# Patient Record
Sex: FEMALE | Race: ASIAN | NOT HISPANIC OR LATINO | Employment: FULL TIME | URBAN - METROPOLITAN AREA
[De-identification: names, ages, dates, MRNs, and addresses within clinical notes are randomized per-mention and may not be internally consistent; named-entity substitution may affect disease eponyms.]

---

## 2017-03-03 ENCOUNTER — OFFICE VISIT (OUTPATIENT)
Dept: URGENT CARE | Facility: URGENT CARE | Age: 62
End: 2017-03-03
Payer: COMMERCIAL

## 2017-03-03 VITALS
WEIGHT: 101.8 LBS | DIASTOLIC BLOOD PRESSURE: 73 MMHG | SYSTOLIC BLOOD PRESSURE: 139 MMHG | HEART RATE: 69 BPM | TEMPERATURE: 98.3 F | BODY MASS INDEX: 21.28 KG/M2 | OXYGEN SATURATION: 100 %

## 2017-03-03 DIAGNOSIS — J20.9 ACUTE BRONCHITIS, UNSPECIFIED ORGANISM: ICD-10-CM

## 2017-03-03 DIAGNOSIS — R07.0 THROAT PAIN: Primary | ICD-10-CM

## 2017-03-03 LAB
DEPRECATED S PYO AG THROAT QL EIA: NORMAL
MICRO REPORT STATUS: NORMAL
SPECIMEN SOURCE: NORMAL

## 2017-03-03 PROCEDURE — 87880 STREP A ASSAY W/OPTIC: CPT | Performed by: PHYSICIAN ASSISTANT

## 2017-03-03 PROCEDURE — 99214 OFFICE O/P EST MOD 30 MIN: CPT | Performed by: PHYSICIAN ASSISTANT

## 2017-03-03 RX ORDER — ALBUTEROL SULFATE 90 UG/1
2 AEROSOL, METERED RESPIRATORY (INHALATION) EVERY 6 HOURS PRN
Qty: 1 INHALER | Refills: 0 | Status: SHIPPED | OUTPATIENT
Start: 2017-03-03 | End: 2018-05-11

## 2017-03-03 RX ORDER — CODEINE PHOSPHATE AND GUAIFENESIN 10; 100 MG/5ML; MG/5ML
1 SOLUTION ORAL EVERY 4 HOURS PRN
Qty: 120 ML | Refills: 0 | Status: SHIPPED | OUTPATIENT
Start: 2017-03-03 | End: 2018-05-11

## 2017-03-03 NOTE — NURSING NOTE
"Chief Complaint   Patient presents with     URI     Started last week       Initial /73  Pulse 69  Temp 98.3  F (36.8  C) (Oral)  Wt 101 lb 12.8 oz (46.2 kg)  SpO2 100%  BMI 21.28 kg/m2 Estimated body mass index is 21.28 kg/(m^2) as calculated from the following:    Height as of 5/27/16: 4' 10\" (1.473 m).    Weight as of this encounter: 101 lb 12.8 oz (46.2 kg).  Medication Reconciliation: complete   Mathew Bhatt/MA      "

## 2017-03-03 NOTE — MR AVS SNAPSHOT
"              After Visit Summary   3/3/2017    Juanita Galvan    MRN: [id number]           Patient Information     Date Of Birth          1955        Visit Information        Provider Department      3/3/2017 5:40 PM Ninfa Daniels PA-C [facility]        Today's Diagnoses     Throat pain    -  1    Acute bronchitis, unspecified organism          Care Instructions    Use the albuterol inhaler during the day to help with cough and shortness of breath.      Use the cough syrup at night as needed.  This will make you sleepy, so you cannot drive after taking.        Follow-ups after your visit        Who to contact     If you have questions or need follow up information about today's clinic visit or your schedule please contact [facility] directly at [phone].  Normal or non-critical lab and imaging results will be communicated to you by [email or web address], letter or phone within 4 business days after the clinic has received the results. If you do not hear from us within 7 days, please contact the clinic through [email or web address] or phone. If you have a critical or abnormal lab result, we will notify you by phone as soon as possible.  Submit refill requests through RMI or call your pharmacy and they will forward the refill request to us. Please allow 3 business days for your refill to be completed.          Additional Information About Your Visit        MyChart Information     RMI lets you send messages to your doctor, view your test results, renew your prescriptions, schedule appointments and more. To sign up, go to [email or web address] . Click on \"Log in\" on the left side of the screen, which will take you to the Welcome page. Then click on \"Sign up Now\" on the right side of the page.     You will be asked to enter the access code listed below, as well as some personal information. Please follow the directions to create your username and password.     Your access code is: " [email or web address]  Expires: 2017  6:22 PM     Your access code will  in 90 days. If you need help or a new code, please call your [place] clinic or [phone].        Care EveryWhere ID     This is your Care EveryWhere ID. This could be used by other organizations to access your [place] medical records  [id number]        Your Vitals Were     Pulse Temperature Pulse Oximetry BMI (Body Mass Index)          69 98.3  F (36.8  C) (Oral) 100% 21.28 kg/m2         Blood Pressure from Last 3 Encounters:   17 139/73   16 112/71   06/10/16 135/76    Weight from Last 3 Encounters:   17 101 lb 12.8 oz (46.2 kg)   16 107 lb 3.2 oz (48.6 kg)   16 105 lb 12.8 oz (48 kg)              We Performed the Following     Strep, Rapid Screen          Today's Medication Changes          These changes are accurate as of: 3/3/17  6:22 PM.  If you have any questions, ask your nurse or doctor.               Start taking these medicines.        Dose/Directions    albuterol 108 (90 BASE) MCG/ACT Inhaler   Commonly known as:  PROAIR HFA/PROVENTIL HFA/VENTOLIN HFA   Used for:  Acute bronchitis, unspecified organism   Started by:  Ninfa Daniels PA-C        Dose:  2 puff   Inhale 2 puffs into the lungs every 6 hours as needed for shortness of breath / dyspnea or wheezing   Quantity:  1 Inhaler   Refills:  0       guaiFENesin-codeine 100-10 MG/5ML Soln solution   Commonly known as:  ROBITUSSIN AC   Used for:  Acute bronchitis, unspecified organism   Started by:  Ninfa Daniels PA-C        Dose:  1 tsp.   Take 5 mLs by mouth every 4 hours as needed for cough (only for nighttime)   Quantity:  120 mL   Refills:  0            Where to get your medicines      These medications were sent to [id number] - [place] [address] [address] AT [facility] & [address] [place] [address]     Phone:  [phone]     albuterol 108 (90 BASE) MCG/ACT Inhaler          Some of these will need a paper prescription and others can be bought over the counter.  Ask your nurse if you have questions.     Bring a paper prescription for each of these medications     guaiFENesin-codeine 100-10 MG/5ML Soln solution                Primary Care Provider Office Phone # Fax #    Stephany Colby PA-C [phone]       [facility] [address]  [facility] [address]        Thank you!     Thank you for choosing [facility]  for your care. Our goal is always to provide you with excellent care. Hearing back from our patients is one way we can continue to improve our services. Please take a few minutes to complete the written survey that you may receive in the mail after your visit with us. Thank you!             Your Updated Medication List - Protect others around you: Learn how to safely use, store and throw away your medicines at www.disposemymeds.org.          This list is accurate as of: 3/3/17  6:22 PM.  Always use your most recent med list.                   Brand Name Dispense Instructions for use    albuterol 108 (90 BASE) MCG/ACT Inhaler    PROAIR HFA/PROVENTIL HFA/VENTOLIN HFA    1 Inhaler    Inhale 2 puffs into the lungs every 6 hours as needed for shortness of breath / dyspnea or wheezing       guaiFENesin-codeine 100-10 MG/5ML Soln solution    ROBITUSSIN AC    120 mL    Take 5 mLs by mouth every 4 hours as needed for cough (only for nighttime)       TYLENOL PO

## 2017-03-03 NOTE — PROGRESS NOTES
SUBJECTIVE:                                                    Juanita Galvan is a 61 year old female who presents to clinic today for the following health issues:      RESPIRATORY SYMPTOMS      Duration: Last week    Description  nasal congestion, rhinorrhea, sore throat, facial pain/pressure, cough, chills, headache and fatigue/malaise    Severity: Moderate.  Cough is worse at night, better during the day.    Accompanying signs and symptoms: None    History (predisposing factors):  none    Precipitating or alleviating factors: None    Therapies tried and outcome: OTC         Problem list and histories reviewed & adjusted, as indicated.  Additional history: as documented    Patient Active Problem List   Diagnosis     CARDIOVASCULAR SCREENING; LDL GOAL LESS THAN 160     Vitreous floaters     Advanced directives, counseling/discussion     Right foot pain     Past Surgical History   Procedure Laterality Date     Colonoscopy  3/26/2012     Procedure:COLONOSCOPY; COLONOSCOPY, SCREEN; Surgeon:MICAH RICHARDSON; Location: OR       Social History   Substance Use Topics     Smoking status: Never Smoker     Smokeless tobacco: Never Used     Alcohol use No     Family History   Problem Relation Age of Onset     CANCER No family hx of      DIABETES No family hx of      Hypertension No family hx of      CEREBROVASCULAR DISEASE No family hx of      Thyroid Disease No family hx of      Glaucoma No family hx of      Macular Degeneration No family hx of          Current Outpatient Prescriptions   Medication Sig Dispense Refill     guaiFENesin-codeine (ROBITUSSIN AC) 100-10 MG/5ML SOLN solution Take 5 mLs by mouth every 4 hours as needed for cough (only for nighttime) 120 mL 0     albuterol (PROAIR HFA/PROVENTIL HFA/VENTOLIN HFA) 108 (90 BASE) MCG/ACT Inhaler Inhale 2 puffs into the lungs every 6 hours as needed for shortness of breath / dyspnea or wheezing 1 Inhaler 0     Acetaminophen (TYLENOL PO)        Recent Labs   Lab  Test  11/28/14   1555  11/07/14   0835  03/29/13   1458   LDL   --   135*  133*   HDL   --   56  45*   TRIG   --   70  92   CR  0.57   --    --    GFRESTIMATED  >90  Non  GFR Calc     --    --    GFRESTBLACK  >90   GFR Calc     --    --    POTASSIUM  3.9   --    --    TSH   --   2.08  1.22      Labs reviewed in EPIC    Reviewed and updated as needed this visit by clinical staff       Reviewed and updated as needed this visit by Provider         ROS:  Constitutional, HEENT, cardiovascular, pulmonary, gi and gu systems are negative, except as otherwise noted.    OBJECTIVE:                                                    /73  Pulse 69  Temp 98.3  F (36.8  C) (Oral)  Wt 101 lb 12.8 oz (46.2 kg)  SpO2 100%  BMI 21.28 kg/m2  Body mass index is 21.28 kg/(m^2).  GENERAL: healthy, alert and no distress  EYES: Eyes grossly normal to inspection, PERRL and conjunctivae and sclerae normal  HENT: ear canals and TM's normal, nose and mouth without ulcers or lesions  NECK: no adenopathy, no asymmetry, masses, or scars and thyroid normal to palpation  RESP: lungs clear to auscultation - no rales, rhonchi or wheezes  CV: regular rate and rhythm, normal S1 S2, no S3 or S4, no murmur, click or rub, no peripheral edema and peripheral pulses strong  ABDOMEN: soft, nontender, no hepatosplenomegaly, no masses and bowel sounds normal  MS: no gross musculoskeletal defects noted, no edema    Diagnostic Test Results:  Strep screen - Negative     ASSESSMENT/PLAN:                                                        ICD-10-CM    1. Throat pain R07.0 Strep, Rapid Screen   2. Acute bronchitis, unspecified organism J20.9 guaiFENesin-codeine (ROBITUSSIN AC) 100-10 MG/5ML SOLN solution     albuterol (PROAIR HFA/PROVENTIL HFA/VENTOLIN HFA) 108 (90 BASE) MCG/ACT Inhaler       See Patient Instructions.  The patient indicates understanding of these issues and agrees with the plan.      Ninfa Daniels,  NHI  [facility]

## 2017-03-04 NOTE — PATIENT INSTRUCTIONS
Use the albuterol inhaler during the day to help with cough and shortness of breath.      Use the cough syrup at night as needed.  This will make you sleepy, so you cannot drive after taking.

## 2017-11-24 ENCOUNTER — RADIANT APPOINTMENT (OUTPATIENT)
Dept: MAMMOGRAPHY | Facility: CLINIC | Age: 62
End: 2017-11-24
Attending: NURSE PRACTITIONER
Payer: COMMERCIAL

## 2017-11-24 DIAGNOSIS — Z12.31 VISIT FOR SCREENING MAMMOGRAM: ICD-10-CM

## 2017-11-24 PROCEDURE — G0202 SCR MAMMO BI INCL CAD: HCPCS | Mod: TC

## 2018-05-11 ENCOUNTER — OFFICE VISIT (OUTPATIENT)
Dept: FAMILY MEDICINE | Facility: CLINIC | Age: 63
End: 2018-05-11
Payer: COMMERCIAL

## 2018-05-11 VITALS
WEIGHT: 103.6 LBS | OXYGEN SATURATION: 100 % | TEMPERATURE: 98.6 F | HEART RATE: 73 BPM | RESPIRATION RATE: 20 BRPM | HEIGHT: 59 IN | BODY MASS INDEX: 20.88 KG/M2 | SYSTOLIC BLOOD PRESSURE: 117 MMHG | DIASTOLIC BLOOD PRESSURE: 75 MMHG

## 2018-05-11 DIAGNOSIS — L85.3 DRY SKIN: ICD-10-CM

## 2018-05-11 DIAGNOSIS — Z00.00 ROUTINE HISTORY AND PHYSICAL EXAMINATION OF ADULT: Primary | ICD-10-CM

## 2018-05-11 DIAGNOSIS — Z11.4 SCREENING FOR HIV (HUMAN IMMUNODEFICIENCY VIRUS): ICD-10-CM

## 2018-05-11 DIAGNOSIS — L30.9 ECZEMA, UNSPECIFIED TYPE: ICD-10-CM

## 2018-05-11 LAB
CHOLEST SERPL-MCNC: 234 MG/DL
GLUCOSE SERPL-MCNC: 100 MG/DL (ref 70–99)
HDLC SERPL-MCNC: 58 MG/DL
LDLC SERPL CALC-MCNC: 156 MG/DL
NONHDLC SERPL-MCNC: 176 MG/DL
TRIGL SERPL-MCNC: 99 MG/DL

## 2018-05-11 PROCEDURE — 99213 OFFICE O/P EST LOW 20 MIN: CPT | Mod: 25 | Performed by: PHYSICIAN ASSISTANT

## 2018-05-11 PROCEDURE — 82947 ASSAY GLUCOSE BLOOD QUANT: CPT | Performed by: PHYSICIAN ASSISTANT

## 2018-05-11 PROCEDURE — 36415 COLL VENOUS BLD VENIPUNCTURE: CPT | Performed by: PHYSICIAN ASSISTANT

## 2018-05-11 PROCEDURE — 87389 HIV-1 AG W/HIV-1&-2 AB AG IA: CPT | Performed by: PHYSICIAN ASSISTANT

## 2018-05-11 PROCEDURE — 80061 LIPID PANEL: CPT | Performed by: PHYSICIAN ASSISTANT

## 2018-05-11 PROCEDURE — 99396 PREV VISIT EST AGE 40-64: CPT | Performed by: PHYSICIAN ASSISTANT

## 2018-05-11 RX ORDER — GLY/DIMETH/PETROLAT,WHT/WATER
CREAM (GRAM) TOPICAL DAILY
Qty: 453 G | Refills: 1 | Status: SHIPPED | OUTPATIENT
Start: 2018-05-11 | End: 2022-11-01

## 2018-05-11 RX ORDER — TRIAMCINOLONE ACETONIDE 1 MG/G
CREAM TOPICAL
Qty: 45 G | Refills: 1 | Status: SHIPPED | OUTPATIENT
Start: 2018-05-11 | End: 2021-03-10

## 2018-05-11 ASSESSMENT — PAIN SCALES - GENERAL: PAINLEVEL: NO PAIN (0)

## 2018-05-11 NOTE — LETTER
May 15, 2018      Juanita Galvan  [address]  [place] [address]        Dear Juanita Johns,    Sugar is elevated, please reduce rice and other carbohydrates in the diet and sugars. Cholesterol is slightly elevated, increase exercise and fiber in the diet to improve this.     Sincerely,      Whitney Coleman PAC/ymv    Resulted Orders   GLUCOSE   Result Value Ref Range    Glucose 100 (H) 70 - 99 mg/dL      Comment:      Fasting specimen   Lipid Profile (Chol, Trig, HDL, LDL calc)   Result Value Ref Range    Cholesterol 234 (H) <200 mg/dL      Comment:      Desirable:       <200 mg/dl    Triglycerides 99 <150 mg/dL      Comment:      Fasting specimen    HDL Cholesterol 58 >49 mg/dL    LDL Cholesterol Calculated 156 (H) <100 mg/dL      Comment:      Above desirable:  100-129 mg/dl  Borderline High:  130-159 mg/dL  High:             160-189 mg/dL  Very high:       >189 mg/dl      Non HDL Cholesterol 176 (H) <130 mg/dL      Comment:      Above Desirable:  130-159 mg/dl  Borderline high:  160-189 mg/dl  High:             190-219 mg/dl  Very high:       >219 mg/dl     HIV Antigen Antibody Combo   Result Value Ref Range    HIV Antigen Antibody Combo Nonreactive NR^Nonreactive          Comment:      HIV-1 p24 Ag & HIV-1/HIV-2 Ab Not Detected

## 2018-05-11 NOTE — PROGRESS NOTES
SUBJECTIVE:   CC: Juanita Galvan is an 62 year old woman who presents for preventive health visit.     Healthy Habits:    Do you get at least three servings of calcium containing foods daily (dairy, green leafy vegetables, etc.)? yes    Amount of exercise or daily activities, outside of work: no    Problems taking medications regularly not applicable    Medication side effects: No    Have you had an eye exam in the past two years? yes    Do you see a dentist twice per year? yes    Do you have sleep apnea, excessive snoring or daytime drowsiness?no      Dry skin      Duration: chronic on and off    Description (location/character/radiation): patient has generalized dry skin and occasionally she gets red itchy patches inside her elbows.     Intensity:  moderate    Accompanying signs and symptoms:     History (similar episodes/previous evaluation): yes on and off    Precipitating or alleviating factors: dry environment    Therapies tried and outcome: baby cream       Today's PHQ-2 Score:   PHQ-2 ( 1999 [employer]) 5/11/2018 5/27/2016   Q1: Little interest or pleasure in doing things 0 0   Q2: Feeling down, depressed or hopeless 0 0   PHQ-2 Score 0 0       Abuse: Current or Past(Physical, Sexual or Emotional)- No  Do you feel safe in your environment - Yes    Social History   Substance Use Topics     Smoking status: Never Smoker     Smokeless tobacco: Never Used     Alcohol use No     If you drink alcohol do you typically have >3 drinks per day or >7 drinks per week? No                     Reviewed orders with patient.  Reviewed health maintenance and updated orders accordingly - Yes  Patient Active Problem List   Diagnosis     CARDIOVASCULAR SCREENING; LDL GOAL LESS THAN 160     Vitreous floaters     Advanced directives, counseling/discussion     Right foot pain     Past Surgical History:   Procedure Laterality Date     COLONOSCOPY  3/26/2012    Procedure:COLONOSCOPY; COLONOSCOPY, SCREEN; Surgeon:MICAH RICHARDSON;  Location: OR       Social History   Substance Use Topics     Smoking status: Never Smoker     Smokeless tobacco: Never Used     Alcohol use No     Family History   Problem Relation Age of Onset     CANCER No family hx of      DIABETES No family hx of      Hypertension No family hx of      CEREBROVASCULAR DISEASE No family hx of      Thyroid Disease No family hx of      Glaucoma No family hx of      Macular Degeneration No family hx of          Current Outpatient Prescriptions   Medication Sig Dispense Refill     Emollient (CETAPHIL MOISTURIZING) CREA Externally apply topically daily 453 g 1     triamcinolone (KENALOG) 0.1 % cream Apply sparingly to affected area three times daily as needed 45 g 1     Allergies   Allergen Reactions     Nkda [No Known Drug Allergies]        Patient over age 50, mutual decision to screen reflected in health maintenance.    Pertinent mammograms are reviewed under the imaging tab.  History of abnormal Pap smear: NO - age 30-65 PAP every 5 years with negative HPV co-testing recommended            ROS:  CONSTITUTIONAL: NEGATIVE for fever, chills, change in weight  INTEGUMENTARY/SKIN: NEGATIVE for worrisome rashes, moles or lesions  EYES: NEGATIVE for vision changes or irritation  ENT: NEGATIVE for ear, mouth and throat problems  RESP: NEGATIVE for significant cough or SOB  BREAST: NEGATIVE for masses, tenderness or discharge  CV: NEGATIVE for chest pain, palpitations or peripheral edema  GI: NEGATIVE for nausea, abdominal pain, heartburn, or change in bowel habits  : NEGATIVE for unusual urinary or vaginal symptoms. No vaginal bleeding.  MUSCULOSKELETAL: NEGATIVE for significant arthralgias or myalgia  NEURO: NEGATIVE for weakness, dizziness or paresthesias  PSYCHIATRIC: NEGATIVE for changes in mood or affect     OBJECTIVE:   /75 (BP Location: Right arm, Patient Position: Sitting, Cuff Size: Adult Regular)  Pulse 73  Temp 98.6  F (37  C) (Oral)  Resp 20  Ht 1.505 m (4'  "11.25\")  Wt 47 kg (103 lb 9.6 oz)  SpO2 100%  BMI 20.75 kg/m2  EXAM:  GENERAL APPEARANCE: healthy, alert and no distress  EYES: Eyes grossly normal to inspection, PERRL and conjunctivae and sclerae normal  HENT: ear canals and TM's normal, nose and mouth without ulcers or lesions, oropharynx clear and oral mucous membranes moist  NECK: no adenopathy, no asymmetry, masses, or scars and thyroid normal to palpation  RESP: lungs clear to auscultation - no rales, rhonchi or wheezes  BREAST: normal without masses, tenderness or nipple discharge and no palpable axillary masses or adenopathy  CV: regular rate and rhythm, normal S1 S2, no S3 or S4, no murmur, click or rub, no peripheral edema and peripheral pulses strong  ABDOMEN: soft, nontender, no hepatosplenomegaly, no masses and bowel sounds normal  MS: no musculoskeletal defects are noted and gait is age appropriate without ataxia  SKIN: no suspicious lesions or rashes, generalized skin dryness  NEURO: Normal strength and tone, sensory exam grossly normal, mentation intact and speech normal  PSYCH: mentation appears normal and affect normal/bright    ASSESSMENT/PLAN:       ICD-10-CM    1. Routine history and physical examination of adult Z00.00 GLUCOSE     Lipid Profile (Chol, Trig, HDL, LDL calc)   2. Eczema, unspecified type L30.9 triamcinolone (KENALOG) 0.1 % cream   3. Dry skin L85.3 Emollient (CETAPHIL MOISTURIZING) CREA   4. Screening for HIV (human immunodeficiency virus) Z11.4 HIV Antigen Antibody Combo     1. Normal exam  2. Use Cetaphil daily all over the body  Use Triamcinolone for dry itchy patches in the elbows   COUNSELING:   Reviewed preventive health counseling, as reflected in patient instructions       Regular exercise       Healthy diet/nutrition         reports that she has never smoked. She has never used smokeless tobacco.    Estimated body mass index is 20.75 kg/(m^2) as calculated from the following:    Height as of this encounter: 1.505 m " "(4' 11.25\").    Weight as of this encounter: 47 kg (103 lb 9.6 oz).       Counseling Resources:  ATP IV Guidelines  Pooled Cohorts Equation Calculator  Breast Cancer Risk Calculator  FRAX Risk Assessment  ICSI Preventive Guidelines  Dietary Guidelines for Americans, 2010  USDA's MyPlate  ASA Prophylaxis  Lung CA Screening    Alecia Coleman PA-C  [facility]  "

## 2018-05-11 NOTE — MR AVS SNAPSHOT
After Visit Summary   5/11/2018    Juanita Galvan    MRN: [id number]           Patient Information     Date Of Birth          1955        Visit Information        Provider Department      5/11/2018 10:05 AM Alecia Coleman PA-C; [employer] [facility]        Today's Diagnoses     Routine history and physical examination of adult    -  1    Eczema, unspecified type        Dry skin        Screening for HIV (human immunodeficiency virus)          Care Instructions      Preventive Health Recommendations  Female Ages 50 - 64    Yearly exam: See your health care provider every year in order to  o Review health changes.   o Discuss preventive care.    o Review your medicines if your doctor has prescribed any.      Get a Pap test every three years (unless you have an abnormal result and your provider advises testing more often).    If you get Pap tests with HPV test, you only need to test every 5 years, unless you have an abnormal result.     You do not need a Pap test if your uterus was removed (hysterectomy) and you have not had cancer.    You should be tested each year for STDs (sexually transmitted diseases) if you're at risk.     Have a mammogram every 1 to 2 years.    Have a colonoscopy at age 50, or have a yearly FIT test (stool test). These exams screen for colon cancer.      Have a cholesterol test every 5 years, or more often if advised.    Have a diabetes test (fasting glucose) every three years. If you are at risk for diabetes, you should have this test more often.     If you are at risk for osteoporosis (brittle bone disease), think about having a bone density scan (DEXA).    Shots: Get a flu shot each year. Get a tetanus shot every 10 years.    Nutrition:     Eat at least 5 servings of fruits and vegetables each day.    Eat whole-grain bread, whole-wheat pasta and brown rice instead of white grains and rice.    Talk to your provider about  "Calcium and Vitamin D.     Lifestyle    Exercise at least 150 minutes a week (30 minutes a day, 5 days a week). This will help you control your weight and prevent disease.    Limit alcohol to one drink per day.    No smoking.     Wear sunscreen to prevent skin cancer.     See your dentist every six months for an exam and cleaning.    See your eye doctor every 1 to 2 years.            Follow-ups after your visit        Who to contact     If you have questions or need follow up information about today's clinic visit or your schedule please contact [facility] directly at [phone].  Normal or non-critical lab and imaging results will be communicated to you by [employer], letter or phone within 4 business days after the clinic has received the results. If you do not hear from us within 7 days, please contact the clinic through [employer] or phone. If you have a critical or abnormal lab result, we will notify you by phone as soon as possible.  Submit refill requests through [employer] or call your pharmacy and they will forward the refill request to us. Please allow 3 business days for your refill to be completed.          Additional Information About Your Visit        MyChart Information     [employer] lets you send messages to your doctor, view your test results, renew your prescriptions, schedule appointments and more. To sign up, go to [email or web address] . Click on \"Log in\" on the left side of the screen, which will take you to the Welcome page. Then click on \"Sign up Now\" on the right side of the page.     You will be asked to enter the access code listed below, as well as some personal information. Please follow the directions to create your username and password.     Your access code is: [email or web address]  Expires: 2018 11:11 AM     Your access code will  in 90 days. If you need help or a new code, please call your [facility] or [phone].        Care EveryWhere ID     This is your " "Care EveryWhere ID. This could be used by other organizations to access your [facility] medical records  [id number]        Your Vitals Were     Pulse Temperature Respirations Height Pulse Oximetry BMI (Body Mass Index)    73 98.6  F (37  C) (Oral) 20 1.505 m (4' 11.25\") 100% 20.75 kg/m2       Blood Pressure from Last 3 Encounters:   05/11/18 117/75   03/03/17 139/73   07/22/16 112/71    Weight from Last 3 Encounters:   05/11/18 47 kg (103 lb 9.6 oz)   03/03/17 46.2 kg (101 lb 12.8 oz)   05/27/16 48.6 kg (107 lb 3.2 oz)              We Performed the Following     GLUCOSE     HIV Antigen Antibody Combo     Lipid Profile (Chol, Trig, HDL, LDL calc)          Today's Medication Changes          These changes are accurate as of 5/11/18 11:11 AM.  If you have any questions, ask your nurse or doctor.               Start taking these medicines.        Dose/Directions    CETAPHIL MOISTURIZING Crea   Used for:  Dry skin   Started by:  Alecia Coleman PA-C        Externally apply topically daily   Quantity:  453 g   Refills:  1       triamcinolone 0.1 % cream   Commonly known as:  KENALOG   Used for:  Eczema, unspecified type   Started by:  Alecia Coleman PA-C        Apply sparingly to affected area three times daily as needed   Quantity:  45 g   Refills:  1            Where to get your medicines      [facility] [place], [place] - [address] AT [facility] [address], [facility] [address]     Phone:  [phone]     CETAPHIL MOISTURIZING Crea    triamcinolone 0.1 % cream                Primary Care Provider    Stephany Colby PA-C       No address on file        Equal Access to Services     FREYA JOHNSTON AH: Belle bryant Socrystal, waaxda luqadaha, qaybta kaalmada adeegyada, keya shultz. So [facility] [phone].    ATENCIÓN: Si flavio cooper, tiene a joe disposición servicios " charla de asistencia lingüística. Titi rodriguez [phone].    We comply with applicable federal civil rights laws and [place] laws. We do not discriminate on the basis of race, color, national origin, age, disability, sex, sexual orientation, or gender identity.            Thank you!     Thank you for choosing [facility]  for your care. Our goal is always to provide you with excellent care. Hearing back from our patients is one way we can continue to improve our services. Please take a few minutes to complete the written survey that you may receive in the mail after your visit with us. Thank you!             Your Updated Medication List - Protect others around you: Learn how to safely use, store and throw away your medicines at www.disposemymeds.org.          This list is accurate as of 5/11/18 11:11 AM.  Always use your most recent med list.                   Brand Name Dispense Instructions for use Diagnosis    CETAPHIL MOISTURIZING Crea     453 g    Externally apply topically daily    Dry skin       triamcinolone 0.1 % cream    KENALOG    45 g    Apply sparingly to affected area three times daily as needed    Eczema, unspecified type

## 2018-05-15 LAB — HIV 1+2 AB+HIV1 P24 AG SERPL QL IA: NONREACTIVE

## 2018-10-21 ENCOUNTER — OFFICE VISIT (OUTPATIENT)
Dept: URGENT CARE | Facility: URGENT CARE | Age: 63
End: 2018-10-21
Payer: COMMERCIAL

## 2018-10-21 VITALS
OXYGEN SATURATION: 98 % | TEMPERATURE: 98.1 F | BODY MASS INDEX: 20.43 KG/M2 | DIASTOLIC BLOOD PRESSURE: 82 MMHG | WEIGHT: 102 LBS | HEART RATE: 82 BPM | RESPIRATION RATE: 17 BRPM | SYSTOLIC BLOOD PRESSURE: 148 MMHG

## 2018-10-21 DIAGNOSIS — G44.219 EPISODIC TENSION-TYPE HEADACHE, NOT INTRACTABLE: Primary | ICD-10-CM

## 2018-10-21 DIAGNOSIS — J06.9 VIRAL URI: ICD-10-CM

## 2018-10-21 PROCEDURE — 99213 OFFICE O/P EST LOW 20 MIN: CPT | Performed by: FAMILY MEDICINE

## 2018-10-21 NOTE — MR AVS SNAPSHOT
"              After Visit Summary   10/21/2018    Juanita Galvan    MRN: [id number]           Patient Information     Date Of Birth          1955        Visit Information        Provider Department      10/21/2018 10:10 AM Bogdan Drummond MD [facility]        Today's Diagnoses     Episodic tension-type headache, not intractable    -  1    Viral URI          Care Instructions    During the day take tylenol/acetaminophen every 5 hours for headache    Continue to drink a good amount of water    Follow-up with your doctor if symptoms don't improve by the middle of the week          Follow-ups after your visit        Who to contact     If you have questions or need follow up information about today's clinic visit or your schedule please contact [facility] directly at [phone].  Normal or non-critical lab and imaging results will be communicated to you by MyChart, letter or phone within 4 business days after the clinic has received the results. If you do not hear from us within 7 days, please contact the clinic through MyChart or phone. If you have a critical or abnormal lab result, we will notify you by phone as soon as possible.  Submit refill requests through [employer] or call your pharmacy and they will forward the refill request to us. Please allow 3 business days for your refill to be completed.          Additional Information About Your Visit        MyChart Information     [employer] lets you send messages to your doctor, view your test results, renew your prescriptions, schedule appointments and more. To sign up, go to [email or web address] . Click on \"Log in\" on the left side of the screen, which will take you to the Welcome page. Then click on \"Sign up Now\" on the right side of the page.     You will be asked to enter the access code listed below, as well as some personal information. Please follow the directions to create your username and password.     Your " access code is: [email or web address]  Expires: 2019 10:44 AM     Your access code will  in 90 days. If you need help or a new code, please call your [facility] clinic or [phone].        Care EveryWhere ID     This is your Care EveryWhere ID. This could be used by other organizations to access your [facility] medical records  [id number]        Your Vitals Were     Pulse Temperature Respirations Pulse Oximetry BMI (Body Mass Index)       82 98.1  F (36.7  C) (Oral) 17 98% 20.43 kg/m2        Blood Pressure from Last 3 Encounters:   10/21/18 148/82   18 117/75   17 139/73    Weight from Last 3 Encounters:   10/21/18 102 lb (46.3 kg)   18 103 lb 9.6 oz (47 kg)   17 101 lb 12.8 oz (46.2 kg)              Today, you had the following     No orders found for display       Primary Care Provider Office Phone # Fax #    [facility] [phone]       [address]  [facility] [address]        Equal Access to Services     FREYA JOHNSTON : Hadii aad ku hadasho Soomaali, waaxda luqadaha, qaybta kaalmada adeegyada, waxay ronaldin hayyasmanyn minal shultz. So [facility] [phone].    ATENCIÓN: Si habla español, tiene a joe disposición servicios gratuitos de asistencia lingüística. Llame al [phone].    We comply with applicable federal civil rights laws and [place] laws. We do not discriminate on the basis of race, color, national origin, age, disability, sex, sexual orientation, or gender identity.            Thank you!     Thank you for choosing [facility]  for your care. Our goal is always to provide you with excellent care. Hearing back from our patients is one way we can continue to improve our services. Please take a few minutes to complete the written survey that you may receive in the mail after your visit with us. Thank you!             Your Updated Medication List - Protect others around you: Learn how to safely use, store and throw away  your medicines at www.disposemymeds.org.          This list is accurate as of 10/21/18 10:44 AM.  Always use your most recent med list.                   Brand Name Dispense Instructions for use Diagnosis    CETAPHIL MOISTURIZING Crea     453 g    Externally apply topically daily    Dry skin       triamcinolone 0.1 % cream    KENALOG    45 g    Apply sparingly to affected area three times daily as needed    Eczema, unspecified type

## 2018-10-21 NOTE — PATIENT INSTRUCTIONS
During the day take tylenol/acetaminophen every 5 hours for headache    Continue to drink a good amount of water    Follow-up with your doctor if symptoms don't improve by the middle of the week

## 2018-12-15 ENCOUNTER — RADIANT APPOINTMENT (OUTPATIENT)
Dept: MAMMOGRAPHY | Facility: CLINIC | Age: 63
End: 2018-12-15
Attending: NURSE PRACTITIONER
Payer: COMMERCIAL

## 2018-12-15 DIAGNOSIS — Z12.31 VISIT FOR SCREENING MAMMOGRAM: ICD-10-CM

## 2018-12-15 PROCEDURE — 77067 SCR MAMMO BI INCL CAD: CPT | Mod: TC

## 2019-02-28 NOTE — PROGRESS NOTES
Left message to call back-orders are set up if she is agreeable.    Subjective:   Juanita Galvan is a 62 year old female who presents for   Chief Complaint   Patient presents with     Dizziness     with headache x 3 days     Cough started a few days ago. Taking nyquil and sleeping okay. Denies runny nose. No sore throat. No recorded fevers. Denies vomiting/diarrhea. No rash of body  No imbalance or falls. Dizziness symptoms feels a little like being on a boat but denies room spinning. No issues with driving. Left sided headache -- 5/10. Headaches comes and goes. SYmptoms better in morning but gets worse into the evening. She drinks a good amount of water each day  No visual difficulties/double vision  No hx of stroke/heart disease/hyperlipidemia/hypertension  no Family history of stroke.     SH: no hx of smoking  PMHX/PSHX/MEDS/ALLERGIES/SHX/FHX reviewed in Epic.    Patient Active Problem List    Diagnosis Date Noted     Right foot pain 06/15/2016     Priority: Medium     Advanced directives, counseling/discussion 03/14/2014     Priority: Medium     Discussed advance care planning with patient; information given to patient to review. March 14, 2014  HUMBERTO Malone floaters 03/05/2013     Priority: Medium     CARDIOVASCULAR SCREENING; LDL GOAL LESS THAN 160 02/21/2012     Priority: Medium       Current Outpatient Prescriptions   Medication     Emollient (CETAPHIL MOISTURIZING) CREA     triamcinolone (KENALOG) 0.1 % cream     No current facility-administered medications for this visit.      ROS:  As above per HPI    Objective:   /82 (BP Location: Left arm, Patient Position: Chair, Cuff Size: Adult Regular)  Pulse 82  Temp 98.1  F (36.7  C) (Oral)  Resp 17  Wt 102 lb (46.3 kg)  SpO2 98%  BMI 20.43 kg/m2, Body mass index is 20.43 kg/(m^2).  Gen:  NAD, well-nourished, sitting in chair comfortably  HEENT: EOMI, sclera anicteric, Head normocephalic, ; nares patent; moist mucous membranes, normal orpharynx , uvula midline  Neck: trachea midline, no  thyromegaly  CV:  Hemodynamically stable, RRR  Pulm:  no increased work of breathing , CTAB, no wheezes/rales/rhonchi   Neuro: No eye nystagmus, CN II-XII intact  ABD: soft, non-distended  Extrem: no cyanosis, edema or clubbing  Skin: no obvious rashes or abnormalities  Psych: Euthymic, linear thoughts, normal rate of speech    Assessment & Plan:   Juanita Galvan, 62 year old female who presents with:  Episodic tension-type headache, not intractable  Viral URI  BP on the upper end of normal given her age goal SBP < 150  Cough symptoms are mild at the worse. TEnsion headaches is what I will classify her symptoms as they are better in the morning and if they do appear will be in the evening time. Good hydration. Recommending tylenol as needed for headaches. No signs of stroke: lack of imbalance, no visual difficulties, neuro exam without abnormalities, no deficits/motor weakness    Bogdan Drummond MD   [facility]     Options for treatment and/or follow-up care were reviewed with the patient. Juanita Galvan and/or legal guardian was engaged and actively involved in the decision making process. Patient/guardian verbalized understanding of the options discussed and was satisfied with the final plan.

## 2019-06-18 ENCOUNTER — OFFICE VISIT (OUTPATIENT)
Dept: URGENT CARE | Facility: URGENT CARE | Age: 64
End: 2019-06-18
Payer: COMMERCIAL

## 2019-06-18 ENCOUNTER — ANCILLARY PROCEDURE (OUTPATIENT)
Dept: GENERAL RADIOLOGY | Facility: CLINIC | Age: 64
End: 2019-06-18
Attending: NURSE PRACTITIONER
Payer: COMMERCIAL

## 2019-06-18 VITALS
WEIGHT: 103.4 LBS | DIASTOLIC BLOOD PRESSURE: 66 MMHG | RESPIRATION RATE: 14 BRPM | TEMPERATURE: 97.7 F | HEART RATE: 80 BPM | OXYGEN SATURATION: 99 % | BODY MASS INDEX: 20.71 KG/M2 | SYSTOLIC BLOOD PRESSURE: 105 MMHG

## 2019-06-18 DIAGNOSIS — R06.02 SOB (SHORTNESS OF BREATH): Primary | ICD-10-CM

## 2019-06-18 PROCEDURE — 93000 ELECTROCARDIOGRAM COMPLETE: CPT | Performed by: NURSE PRACTITIONER

## 2019-06-18 PROCEDURE — 99214 OFFICE O/P EST MOD 30 MIN: CPT | Performed by: NURSE PRACTITIONER

## 2019-06-18 PROCEDURE — 71046 X-RAY EXAM CHEST 2 VIEWS: CPT

## 2019-06-18 ASSESSMENT — ENCOUNTER SYMPTOMS
PALPITATIONS: 1
FEVER: 0
CHILLS: 0
NAUSEA: 0
DIARRHEA: 0
VOMITING: 0
SHORTNESS OF BREATH: 1
COUGH: 0
SORE THROAT: 0
HEADACHES: 0
RHINORRHEA: 0

## 2019-06-18 NOTE — PROGRESS NOTES
SUBJECTIVE:   Juanita Galvan is a 63 year old female presenting with a chief complaint of   Chief Complaint   Patient presents with     Shortness of Breath     Started this morning        She is an established patient of [facility].    SOB    Onset of symptoms was 5 hour(s) ago.wAS at work, and started feeling SOB and heart beating fast.  Denies: chest pain, cough, headache   Course of illness is worsening.    Severity moderate  Current and Associated symptoms: shortness of breath  Treatment measures tried include None tried.  Predisposing factors include None.  Denies heart disease in family, hypertension.  New medication,    Review of Systems   Constitutional: Negative for chills and fever.   HENT: Negative for congestion, ear pain, rhinorrhea and sore throat.    Respiratory: Positive for shortness of breath. Negative for cough.    Cardiovascular: Positive for palpitations.   Gastrointestinal: Negative for diarrhea, nausea and vomiting.   Neurological: Negative for headaches.   All other systems reviewed and are negative.      Past Medical History:   Diagnosis Date     Lipoma      Melasma      Vitreous floaters 3/5/2013     Family History   Problem Relation Age of Onset     Cancer No family hx of      Diabetes No family hx of      Hypertension No family hx of      Cerebrovascular Disease No family hx of      Thyroid Disease No family hx of      Glaucoma No family hx of      Macular Degeneration No family hx of      Current Outpatient Medications   Medication Sig Dispense Refill     Emollient (CETAPHIL MOISTURIZING) CREA Externally apply topically daily (Patient not taking: Reported on 10/21/2018) 453 g 1     triamcinolone (KENALOG) 0.1 % cream Apply sparingly to affected area three times daily as needed (Patient not taking: Reported on 10/21/2018) 45 g 1     Social History     Tobacco Use     Smoking status: Never Smoker     Smokeless tobacco: Never Used   Substance Use Topics     Alcohol use: No       OBJECTIVE  BP  105/66   Pulse 80   Temp 97.7  F (36.5  C) (Oral)   Resp 14   Wt 46.9 kg (103 lb 6.4 oz)   SpO2 99%   BMI 20.71 kg/m      Physical Exam   Constitutional: No distress.   HENT:   Head: Normocephalic and atraumatic.   Right Ear: Tympanic membrane and external ear normal.   Left Ear: Tympanic membrane and external ear normal.   Mouth/Throat: Oropharynx is clear and moist.   Eyes: Pupils are equal, round, and reactive to light. EOM are normal.   Neck: Normal range of motion. Neck supple.   Cardiovascular: Normal rate and normal heart sounds.   Pulmonary/Chest: Effort normal and breath sounds normal. No stridor. No respiratory distress. She has no wheezes. She has no rales. She exhibits no tenderness.   Lymphadenopathy:     She has no cervical adenopathy.   Neurological: She is alert. No cranial nerve deficit.   Skin: Skin is warm and dry. She is not diaphoretic.   Psychiatric: She has a normal mood and affect.   Nursing note and vitals reviewed.        ASSESSMENT:      ICD-10-CM    1. SOB (shortness of breath) R06.02 EKG 12-lead complete w/read - Clinics     XR Chest 2 Views          Differential Diagnosis:   Pleurisy  Angina  Palpitations  Pericarditis  Panic/Anxiety    Serious Comorbid Conditions:  Adult:  None    PLAN:  The EKG on the chest x-ray done are normal,  this is discussed with the patient.  She reports resolution of shortness of breath while at the clinic.  I suspect most likely is anxiety.  Noted elevation of cholesterol in May 2018, an advise for a physical exam with PCP advised.   I advised to go to the ER she gets shortness of breath again.  All questions are answered and patient is in agreement with the plan.      Patient Instructions     Patient Education     Th? D?c (Ch?ng Khó Th?)  Th? d?c là c?m giác khi quý v? không th? b?t k?p h?i th? c?a mình ho?c l?y ?? không khí. Nó c?ng còn ???c g?i là ch?ng khó th?.  Ch?ng khó th? có th? là do harinder?u tình tr?ng khác nhau. Các tình tr?ng này indra  g?m:    Lên c?n j carlos?n c?p tính.    B?nh ph?i mãn tính b? tr?m tr?ng h?n nh? viêm ph? qu?n mãn tính và b?nh tràn khí.     J Carlos mara. ?i?u này x?y ra khi c? mara b? y?u khi?n cho có thêm harinder?u ch?t d?ch tích t? ann ph?i.    Lên c?n s? hãi ho?c lo âu. S? hãi có th? làm cho hít th? nhanh (t?ng thông khí).    Viêm ph?i, ho?c harinder?m trùng t? bào ph?i.    B? ti?p xúc v?i các ch?t ??c h?i, h?i s??ng, khói, ho?c m?t s? thu?c men.    Máu ?óng c?c ann ph?i (t?c m?ch ph?i). ?i?u này th??ng là do m?t c?c máu sâu ann t?nh m?ch c?a c?ng chân (máu ?óng c?c sâu ann t?nh m?ch) v? ra và di billy?n ??n ph?i.     Lên c?n ?au mara ho?c ?au ng?c có liên leny t?i mara (t?c ng?c).    Thi?u máu.    Ph?i x?p (tràn khí ph? m?c).    Háo n??c.    Kendrick Botswanan.  D?a amanuel l?n ??n khám c?a quý v? hôm nay, nguyên nhân chính xác c?a ch?ng th? d?c c?a quý v? không ???c ch?c ch?n. Các th? shirin?m c?a quý v? không cho th?y b?t c? nguyên nhân nghiêm tr?ng nào v? ch?ng khó th?. Quý v? có th? c?n làm các th? shirin?m khác ?? tìm xem quý v? có b? m?t v?n ?? nghiêm tr?ng nào hay không. ?i?u leny tr?ng là amanuel dõi b?t c? các tri?u ch?ng nào m?i ho?c các tri?u ch?ng b? tr?m tr?ng thêm. Khám amanuel dõi v?i nhân viên y t? c?a quý v? amanuel ch? d?n.  Ch?m sóc t?i johnnie  Làm amanuel các mách b?o steven ?ây ?? ch?m sóc cho b?n thân quý v? t?i nhà:    Khi các tri?u ch?ng c?a quý v? ?? h?n, hãy tr? l?i v?i các ho?t ??ng th??ng l? c?a mình.    N?u quý v? hút thu?c lá, thì c?n ph?i ng?ng l?i. Janie johnnie m?t ch??ng trình kwaku thu?c lá ho?c yêu c?u nhân viên y t? c?a quý v? giúp ??.    ?n m?t ch? ?? ?n lành m?nh và ng? ??y gi?c.    T?p th? d?c th??ng xuyên. Bàn v?i nhân viên y t? c?a quý v? tr??c khi b?t ??u t?p th? d?c, ??c bi?t khi quý v? b? các c?n b?nh khác.    C?t gi?m l??ng cà phê in và các ch?t kích thích mà quý v? tiêu th?.  Ch?m sóc amanuel dõi  Khám amanuel dõi v?i nhân viên y t? c?a quý v? ho?c nh? ?ã ???c c?n d?n.  N?u các th? shirin?m ?ã ???c th?c hi?n, quý v? s? ???c cho bi?t là có  c?n thay ??i cách ?i?u tr? hay không. Quý v? có th? g?i amanuel ch? d?n ?? bi?t k?t qu?.  (Ghi chú: N?u có ch?p tracy antonietta?n X, m?t chuyên johnnie s? xem hình ch?p này. Quý v? s? ???c cho bi?t v? b?t c? phát hi?n m?i nào có th? ?nh h??ng t?i s? ch?m sóc c?a quý v?.)  G?i 911 ho?c ?i tìm s? ch?m sóc y abigail ngay  Th? d?c có th? là m?t d?u hi?u v? m?t v?n ?? y abigail nghiêm tr?ng. Thí d?, nó có th? là m?t v?n ?? v? mara ho?c ph?i c?a quý v?. G?i 911 n?u quý v? b? th? d?c ho?c khó th? tr?m tr?ng h?n, ??c bi?t là kèm amanuel b?t c? tri?u ch?ng nào nh? d??i ?ây:    Quý v? b? l?n l?n ho?c khó ?ánh th?c quý v? d?y.    Quý v? b? ng?t x?u ho?c b?t t?nh.    Quý v? có nh?p mara ??p nhanh, ho?c nh?p mara ??p b?t th??ng.     Quý v? ho kh?c ra máu.    Quý v? b? ?au n?i ng?c, cánh jocelyn, vai, c?, ho?c l?ng phía trên.    Quý v? b? ?? m? hôi nh? t?m.  Khi nào ?i tìm s? khuyên nh? v? y t?  G?i nhân viên y t? ngay n?u quý v? b? b?t c? nh?ng ?i?u nào steven ?ây:    H?i b? th? d?c ho?c th? khò khè     N?i ??, ?au ho?c s?ng n?i c?ng chân, cánh jocelyn, ho?c vùng c? th? khác    S?ng n?i c? mariel c?ng chân ho?c m?t cá    Lên cân nhanh    Chóng m?t ho?c y?u s?c    S?t t? 100.4 F (38 C) tr? lên, ho?c amanuel ch? d?n c?a nhân viên y t?  Date Last Reviewed: 9/13/2015 2000-2018 [employer]. [address] [place] [address], [place] [address]. T?t c? các daryn?n ???c b?o l?u. Thông tin này không nh?m thay th? cho d?ch v? ch?m sóc y t? benjamin quinones môn. C?n mechelle wheat amanuel s? ch? d?n t? chuoneln johnnie ch?m sóc s?c ramses? c?a quý v?.

## 2019-06-18 NOTE — PATIENT INSTRUCTIONS
Patient Education     Th? D?c (Ch?ng Khó Th?)  Th? d?c là c?m giác khi quý v? không th? b?t k?p h?i th? c?a mình ho?c l?y ?? không khí. Nó c?ng còn ???c g?i là ch?ng khó th?.  Ch?ng khó th? có th? là do harinder?u tình tr?ng khác nhau. Các tình tr?ng này indra g?m:    Lên c?n j carlos?n c?p tính.    B?nh ph?i mãn tính b? tr?m tr?ng h?n nh? viêm ph? qu?n mãn tính và b?nh tràn khí.     J Carlos mara. ?i?u này x?y ra khi c? mara b? y?u khi?n cho có thêm harinder?u ch?t d?ch tích t? ann ph?i.    Lên c?n s? hãi ho?c lo âu. S? hãi có th? làm cho hít th? nhanh (t?ng thông khí).    Viêm ph?i, ho?c harinder?m trùng t? bào ph?i.    B? ti?p xúc v?i các ch?t ??c h?i, h?i s??ng, khói, ho?c m?t s? thu?c men.    Máu ?óng c?c ann ph?i (t?c m?ch ph?i). ?i?u này th??ng là do m?t c?c máu sâu ann t?nh m?ch c?a c?ng chân (máu ?óng c?c sâu ann t?nh m?ch) v? ra và di billy?n ??n ph?i.     Lên c?n ?au mara ho?c ?au ng?c có liên leny t?i mara (t?c ng?c).    Thi?u máu.    Ph?i x?p (tràn khí ph? m?c).    Háo n??c.    Kendrick ayde.  D?a amanuel l?n ??n khám c?a quý v? hôm nay, nguyên nhân chính xác c?a ch?ng th? d?c c?a quý v? không ???c ch?c ch?n. Các th? shirin?m c?a quý v? không cho th?y b?t c? nguyên nhân nghiêm tr?ng nào v? ch?ng khó th?. Quý v? có th? c?n làm các th? shirin?m khác ?? tìm xem quý v? có b? m?t v?n ?? nghiêm tr?ng nào hay không. ?i?u leny tr?ng là amanuel dõi b?t c? các tri?u ch?ng nào m?i ho?c các tri?u ch?ng b? tr?m tr?ng thêm. Khám amanuel dõi v?i nhân viên y t? c?a quý v? amanuel ch? d?n.  Ch?m sóc t?i johnnie  Làm amanuel các mách b?o steven ?ây ?? ch?m sóc cho b?n thân quý v? t?i nhà:    Khi các tri?u ch?ng c?a quý v? ?? h?n, hãy tr? l?i v?i các ho?t ??ng th??ng l? c?a mình.    N?u quý v? hút thu?c lá, thì c?n ph?i ng?ng l?i. Janie johnnie m?t ch??ng trình kwaku thu?c lá ho?c yêu c?u nhân viên y t? c?a quý v? giúp ??.    ?n m?t ch? ?? ?n lành m?nh và ng? ??y gi?c.    T?p th? d?c th??ng xuyên. Bàn v?i nhân viên y t? c?a quý v? tr??c khi b?t ??u t?p th? d?c, ??c bi?t khi quý  v? b? các c?n b?nh khác.    C?t gi?m l??ng cà phê in và các ch?t kích thích mà quý v? tiêu th?.  Ch?m sóc amanuel dõi  Khám amanuel dõi v?i nhân viên y t? c?a quý v? ho?c nh? ?ã ???c c?n d?n.  N?u các th? shirin?m ?ã ???c th?c hi?n, quý v? s? ???c cho bi?t là có c?n thay ??i cách ?i?u tr? hay không. Quý v? có th? g?i amanuel ch? d?n ?? bi?t k?t qu?.  (Ghi chú: N?u có ch?p tracy antonietta?n X, m?t chuyên johnnie s? xem hình ch?p này. Quý v? s? ???c cho bi?t v? b?t c? phát hi?n m?i nào có th? ?nh h??ng t?i s? ch?m sóc c?a quý v?.)  G?i 911 ho?c ?i tìm s? ch?m sóc y abigail ngay  Th? d?c có th? là m?t d?u hi?u v? m?t v?n ?? y abigail nghiêm tr?ng. Thí d?, nó có th? là m?t v?n ?? v? mara ho?c ph?i c?a quý v?. G?i 911 n?u quý v? b? th? d?c ho?c khó th? tr?m tr?ng h?n, ??c bi?t là kèm amanuel b?t c? tri?u ch?ng nào nh? d??i ?ây:    Quý v? b? l?n l?n ho?c khó ?ánh th?c quý v? d?y.    Quý v? b? ng?t x?u ho?c b?t t?nh.    Quý v? có nh?p mara ??p nhanh, ho?c nh?p mara ??p b?t th??ng.     Quý v? ho kh?c ra máu.    Quý v? b? ?au n?i ng?c, cánalan banks, vai, c?, ho?c l?ng phía trên.    Quý v? b? ?? m? hôi nh? t?m.  Khi nào ?i tìm s? khuyên nh? v? y t?  G?i nhân viên y t? ngay n?u quý v? b? b?t c? nh?ng ?i?u nào stveen ?ây:    H?i b? th? d?c ho?c th? khò khè     N?i ??, ?au ho?c s?ng n?i c?ng chân, selma banks, ho?c vùng c? th? khác    S?ng n?i c? mariel c?ng chân ho?c m?t cá    Lên cân nhanh    Chóng m?t ho?c y?u s?c    S?t t? 100.4 F (38 C) tr? lên, ho?c amanuel ch? d?n c?a nhân viên y t?  Date Last Reviewed: 9/13/2015 2000-2018 [employer]. [address] [place] [id number]. T?t c? các daryn?n ???c b?o l?u. Thông tin này không nh?m thay th? cho d?ch v? ch?m sóc y t? benjamin tính chuyên môn. C?n luôn tuân amanuel s? ch? d?n t? chuyên johnnie ch?m sóc s?c ramses? c?a quý v?.

## 2019-08-13 ENCOUNTER — OFFICE VISIT (OUTPATIENT)
Dept: FAMILY MEDICINE | Facility: CLINIC | Age: 64
End: 2019-08-13
Payer: COMMERCIAL

## 2019-08-13 VITALS
WEIGHT: 103 LBS | BODY MASS INDEX: 20.76 KG/M2 | HEART RATE: 64 BPM | OXYGEN SATURATION: 99 % | TEMPERATURE: 97.6 F | HEIGHT: 59 IN | RESPIRATION RATE: 16 BRPM | DIASTOLIC BLOOD PRESSURE: 76 MMHG | SYSTOLIC BLOOD PRESSURE: 128 MMHG

## 2019-08-13 DIAGNOSIS — M65.30 TRIGGER FINGER, ACQUIRED: ICD-10-CM

## 2019-08-13 DIAGNOSIS — Z12.4 SCREENING FOR MALIGNANT NEOPLASM OF CERVIX: Primary | ICD-10-CM

## 2019-08-13 DIAGNOSIS — Z00.00 ROUTINE GENERAL MEDICAL EXAMINATION AT A HEALTH CARE FACILITY: ICD-10-CM

## 2019-08-13 LAB
CHOLEST SERPL-MCNC: 262 MG/DL
GLUCOSE SERPL-MCNC: 99 MG/DL (ref 70–99)
HDLC SERPL-MCNC: 61 MG/DL
LDLC SERPL CALC-MCNC: 188 MG/DL
NONHDLC SERPL-MCNC: 201 MG/DL
TRIGL SERPL-MCNC: 64 MG/DL

## 2019-08-13 PROCEDURE — 82947 ASSAY GLUCOSE BLOOD QUANT: CPT | Performed by: PHYSICIAN ASSISTANT

## 2019-08-13 PROCEDURE — 80061 LIPID PANEL: CPT | Performed by: PHYSICIAN ASSISTANT

## 2019-08-13 PROCEDURE — G0145 SCR C/V CYTO,THINLAYER,RESCR: HCPCS | Performed by: PHYSICIAN ASSISTANT

## 2019-08-13 PROCEDURE — 99396 PREV VISIT EST AGE 40-64: CPT | Performed by: PHYSICIAN ASSISTANT

## 2019-08-13 PROCEDURE — 36415 COLL VENOUS BLD VENIPUNCTURE: CPT | Performed by: PHYSICIAN ASSISTANT

## 2019-08-13 PROCEDURE — 99213 OFFICE O/P EST LOW 20 MIN: CPT | Mod: 25 | Performed by: PHYSICIAN ASSISTANT

## 2019-08-13 PROCEDURE — 87624 HPV HI-RISK TYP POOLED RSLT: CPT | Performed by: PHYSICIAN ASSISTANT

## 2019-08-13 ASSESSMENT — MIFFLIN-ST. JEOR: SCORE: 937.83

## 2019-08-13 NOTE — PATIENT INSTRUCTIONS
At [facility], we strive to deliver an exceptional experience to you, every time we see you.  If you receive a survey in the mail, please send us back your thoughts. We really do value your feedback.    Your care team:     Family Medicine   NHI Brown MD Emily Bunt, APRN CNP S. Matthew Hockett, MD Pamela Kolacz, MD Angela Wermerskirchen, MD         Clinic hours: Monday - Wednesday 7 am-7 pm   Thursdays and Fridays 7 am-5 pm.     [facility]: Monday - Friday 11 am-9 pm,   Saturday and Sunday 9 am-5 pm.    [facility]: Monday -Thursday 8 am-8 pm; Friday 8 am-6 pm; Saturday and Sunday 9 am-5 pm.     [facility]: Monday - Thursday 8 am - 7 pm; Friday 8 am - 6 pm    Clinic: [phone]   [facility]: [phone]   [facility]: [phone]            Preventive Health Recommendations  Female Ages 50 - 64    Yearly exam: See your health care provider every year in order to  o Review health changes.   o Discuss preventive care.    o Review your medicines if your doctor has prescribed any.      Get a Pap test every three years (unless you have an abnormal result and your provider advises testing more often).    If you get Pap tests with HPV test, you only need to test every 5 years, unless you have an abnormal result.     You do not need a Pap test if your uterus was removed (hysterectomy) and you have not had cancer.    You should be tested each year for STDs (sexually transmitted diseases) if you're at risk.     Have a mammogram every 1 to 2 years.    Have a colonoscopy at age 50, or have a yearly FIT test (stool test). These exams screen for colon cancer.      Have a cholesterol test every 5 years, or more often if advised.    Have a diabetes test (fasting glucose) every three years. If you are at risk for diabetes, you should have this test more often.     If you are at risk for  osteoporosis (brittle bone disease), think about having a bone density scan (DEXA).    Shots: Get a flu shot each year. Get a tetanus shot every 10 years.    Nutrition:     Eat at least 5 servings of fruits and vegetables each day.    Eat whole-grain bread, whole-wheat pasta and brown rice instead of white grains and rice.    Get adequate Calcium and Vitamin D.     Lifestyle    Exercise at least 150 minutes a week (30 minutes a day, 5 days a week). This will help you control your weight and prevent disease.    Limit alcohol to one drink per day.    No smoking.     Wear sunscreen to prevent skin cancer.     See your dentist every six months for an exam and cleaning.    See your eye doctor every 1 to 2 years.

## 2019-08-13 NOTE — PROGRESS NOTES
SUBJECTIVE:   CC: Juanita Galvan is an 63 year old woman who presents for preventive health visit.     Healthy Habits:    Do you get at least three servings of calcium containing foods daily (dairy, green leafy vegetables, etc.)? yes    Amount of exercise or daily activities, outside of work: 1 day(s) per week    Problems taking medications regularly not applicable    Medication side effects: No    Have you had an eye exam in the past two years? yes    Do you see a dentist twice per year? yes    Do you have sleep apnea, excessive snoring or daytime drowsiness?no      Musculoskeletal problem/pain      Duration: 3 months    Description: finger gets stuck after flexing  Location: right hand middle finger    Intensity:  moderate    Accompanying signs and symptoms: none    History  Previous similar problem: no   Previous evaluation:  none    Precipitating or alleviating factors:  Trauma or overuse: no   Aggravating factors include: none    Therapies tried and outcome: nothing      Today's PHQ-2 Score:   PHQ-2 ( 1999 [employer]) 8/13/2019 5/11/2018   Q1: Little interest or pleasure in doing things 0 0   Q2: Feeling down, depressed or hopeless 0 0   PHQ-2 Score 0 0       Abuse: Current or Past(Physical, Sexual or Emotional)- No  Do you feel safe in your environment? Yes    Social History     Tobacco Use     Smoking status: Never Smoker     Smokeless tobacco: Never Used   Substance Use Topics     Alcohol use: No     If you drink alcohol do you typically have >3 drinks per day or >7 drinks per week? No                     Reviewed orders with patient.  Reviewed health maintenance and updated orders accordingly - Yes  Patient Active Problem List   Diagnosis     CARDIOVASCULAR SCREENING; LDL GOAL LESS THAN 160     Vitreous floaters     Advanced directives, counseling/discussion     Right foot pain     Past Surgical History:   Procedure Laterality Date     COLONOSCOPY  3/26/2012    Procedure:COLONOSCOPY; COLONOSCOPY, SCREEN;  Surgeon:MICAH RICHARDSON; Location: OR       Social History     Tobacco Use     Smoking status: Never Smoker     Smokeless tobacco: Never Used   Substance Use Topics     Alcohol use: No     Family History   Problem Relation Age of Onset     Cancer No family hx of      Diabetes No family hx of      Hypertension No family hx of      Cerebrovascular Disease No family hx of      Thyroid Disease No family hx of      Glaucoma No family hx of      Macular Degeneration No family hx of          Current Outpatient Medications   Medication Sig Dispense Refill     Omega-3 Fatty Acids (OMEGA 3 PO)        Emollient (CETAPHIL MOISTURIZING) CREA Externally apply topically daily (Patient not taking: Reported on 10/21/2018) 453 g 1     triamcinolone (KENALOG) 0.1 % cream Apply sparingly to affected area three times daily as needed (Patient not taking: Reported on 10/21/2018) 45 g 1     Allergies   Allergen Reactions     Nkda [No Known Drug Allergies]        Mammogram Screening: Patient over age 50, mutual decision to screen reflected in health maintenance.    Pertinent mammograms are reviewed under the imaging tab.  History of abnormal Pap smear: NO - age 30-65 PAP every 5 years with negative HPV co-testing recommended  PAP / HPV 5/27/2016 4/5/2013   PAP NIL NIL     Reviewed and updated as needed this visit by clinical staff  Tobacco  Allergies  Meds         Reviewed and updated as needed this visit by Provider            ROS:  CONSTITUTIONAL: NEGATIVE for fever, chills, change in weight  INTEGUMENTARY/SKIN: NEGATIVE for worrisome rashes, moles or lesions  EYES: NEGATIVE for vision changes or irritation  ENT: NEGATIVE for ear, mouth and throat problems  RESP: NEGATIVE for significant cough or SOB  BREAST: NEGATIVE for masses, tenderness or discharge  CV: NEGATIVE for chest pain, palpitations or peripheral edema  GI: NEGATIVE for nausea, abdominal pain, heartburn, or change in bowel habits  : NEGATIVE for unusual urinary  "or vaginal symptoms. No vaginal bleeding.  MUSCULOSKELETAL: NEGATIVE for significant arthralgias or myalgia  NEURO: NEGATIVE for weakness, dizziness or paresthesias  PSYCHIATRIC: NEGATIVE for changes in mood or affect     OBJECTIVE:   /76 (BP Location: Right arm, Patient Position: Sitting, Cuff Size: Adult Regular)   Pulse 64   Temp 97.6  F (36.4  C) (Oral)   Resp 16   Ht 1.499 m (4' 11\")   Wt 46.7 kg (103 lb)   SpO2 99%   BMI 20.80 kg/m    EXAM:  GENERAL APPEARANCE: healthy, alert and no distress  EYES: Eyes grossly normal to inspection, PERRL and conjunctivae and sclerae normal  HENT: ear canals and TM's normal, nose and mouth without ulcers or lesions, oropharynx clear and oral mucous membranes moist  NECK: no adenopathy, no asymmetry, masses, or scars and thyroid normal to palpation  RESP: lungs clear to auscultation - no rales, rhonchi or wheezes  BREAST: normal without masses, tenderness or nipple discharge and no palpable axillary masses or adenopathy  CV: regular rate and rhythm, normal S1 S2, no S3 or S4, no murmur, click or rub, no peripheral edema and peripheral pulses strong  ABDOMEN: soft, nontender, no hepatosplenomegaly, no masses and bowel sounds normal   (female): normal female external genitalia, normal urethral meatus, vaginal mucosal atrophy noted, normal cervix, adnexae, and uterus without masses or abnormal discharge  MS: no musculoskeletal defects are noted and gait is age appropriate without ataxia  SKIN: no suspicious lesions or rashes  NEURO: Normal strength and tone, sensory exam grossly normal, mentation intact and speech normal  PSYCH: mentation appears normal and affect normal/bright    Diagnostic Test Results:  Labs reviewed in Epic    ASSESSMENT/PLAN:       ICD-10-CM    1. Screening for malignant neoplasm of cervix Z12.4 Pap imaged thin layer screen with HPV - recommended age 30 - 65 years (select HPV order below)     HPV High Risk Types DNA Cervical   2. Routine " "general medical examination at a health care facility Z00.00 Lipid Profile (Chol, Trig, HDL, LDL calc)     Glucose   3. Trigger finger, acquired M65.30 ORTHO  REFERRAL       COUNSELING:   Reviewed preventive health counseling, as reflected in patient instructions       Regular exercise       Healthy diet/nutrition       Colon cancer screening    Estimated body mass index is 20.8 kg/m  as calculated from the following:    Height as of this encounter: 1.499 m (4' 11\").    Weight as of this encounter: 46.7 kg (103 lb).         reports that she has never smoked. She has never used smokeless tobacco.      Counseling Resources:  ATP IV Guidelines  Pooled Cohorts Equation Calculator  Breast Cancer Risk Calculator  FRAX Risk Assessment  ICSI Preventive Guidelines  Dietary Guidelines for Americans, 2010  USDA's MyPlate  ASA Prophylaxis  Lung CA Screening    Alecia Coleman PA-C  [facility]  "

## 2019-08-13 NOTE — LETTER
August 15, 2019      Juanita Johns YANET Mandy  [address] [address]  [place] [address]        Dear ,    We are writing to inform you of your test results.    Glucose is within normal limits, cholesterol is high, please reduce saturated fats and carbohydrates (rice, noodles, breasd) recheck fasting lipid in 6 months and if still high, you will need to get started on cholesterol medication.     Resulted Orders   Lipid Profile (Chol, Trig, HDL, LDL calc)   Result Value Ref Range    Cholesterol 262 (H) <200 mg/dL      Comment:      Desirable:       <200 mg/dl    Triglycerides 64 <150 mg/dL      Comment:      Fasting specimen    HDL Cholesterol 61 >49 mg/dL    LDL Cholesterol Calculated 188 (H) <100 mg/dL      Comment:      Above desirable:  100-129 mg/dl  Borderline High:  130-159 mg/dL  High:             160-189 mg/dL  Very high:       >189 mg/dl      Non HDL Cholesterol 201 (H) <130 mg/dL      Comment:      Above Desirable:  130-159 mg/dl  Borderline high:  160-189 mg/dl  High:             190-219 mg/dl  Very high:       >219 mg/dl     Glucose   Result Value Ref Range    Glucose 99 70 - 99 mg/dL      Comment:      Fasting specimen       If you have any questions or concerns, please call the clinic at the number listed above.       Sincerely,        Alecia Coleman PA-C

## 2019-08-13 NOTE — LETTER
August 21, 2019    Juanita Johns YANET Mandy  [address] [address] [address]    Dear ,  This letter is regarding your recent Pap smear (cervical cancer screening) and Human Papillomavirus (HPV) test.  We are happy to inform you that your Pap smear result is normal. Cervical cancer is closely linked with certain types of HPV. Your results showed no evidence of high-risk HPV.  We recommend you have your next PAP smear and HPV test in 5 years.  You will still need to return to the clinic every year for an annual exam and other preventive tests.  If you have additional questions regarding this result, please call our registered nurse, Asia at [phone].  Sincerely,    Alecia Coleman PA-C /angelo

## 2019-08-16 LAB
COPATH REPORT: NORMAL
PAP: NORMAL

## 2019-08-20 ENCOUNTER — OFFICE VISIT (OUTPATIENT)
Dept: URGENT CARE | Facility: URGENT CARE | Age: 64
End: 2019-08-20
Payer: COMMERCIAL

## 2019-08-20 VITALS
DIASTOLIC BLOOD PRESSURE: 79 MMHG | WEIGHT: 105.4 LBS | HEIGHT: 59 IN | TEMPERATURE: 98.1 F | OXYGEN SATURATION: 99 % | SYSTOLIC BLOOD PRESSURE: 122 MMHG | BODY MASS INDEX: 21.25 KG/M2 | HEART RATE: 72 BPM | RESPIRATION RATE: 16 BRPM

## 2019-08-20 DIAGNOSIS — R09.81 NASAL CONGESTION: ICD-10-CM

## 2019-08-20 DIAGNOSIS — J30.89 SEASONAL ALLERGIC RHINITIS DUE TO OTHER ALLERGIC TRIGGER: Primary | ICD-10-CM

## 2019-08-20 LAB
FINAL DIAGNOSIS: NORMAL
HPV HR 12 DNA CVX QL NAA+PROBE: NEGATIVE
HPV16 DNA SPEC QL NAA+PROBE: NEGATIVE
HPV18 DNA SPEC QL NAA+PROBE: NEGATIVE
SPECIMEN DESCRIPTION: NORMAL
SPECIMEN SOURCE CVX/VAG CYTO: NORMAL

## 2019-08-20 PROCEDURE — 99213 OFFICE O/P EST LOW 20 MIN: CPT | Performed by: NURSE PRACTITIONER

## 2019-08-20 RX ORDER — ECHINACEA PURPUREA EXTRACT 125 MG
TABLET ORAL
Qty: 30 ML | Refills: 0 | Status: SHIPPED | OUTPATIENT
Start: 2019-08-20 | End: 2020-09-03

## 2019-08-20 RX ORDER — FLUTICASONE PROPIONATE 50 MCG
1-2 SPRAY, SUSPENSION (ML) NASAL DAILY
Qty: 9.9 ML | Refills: 0 | Status: SHIPPED | OUTPATIENT
Start: 2019-08-20 | End: 2019-08-27

## 2019-08-20 ASSESSMENT — ENCOUNTER SYMPTOMS
COUGH: 0
RHINORRHEA: 0
CHILLS: 0
SORE THROAT: 0
SHORTNESS OF BREATH: 0
NAUSEA: 0
FEVER: 0
HEADACHES: 0
VOMITING: 0
DIARRHEA: 0

## 2019-08-20 ASSESSMENT — PAIN SCALES - GENERAL: PAINLEVEL: NO PAIN (0)

## 2019-08-20 ASSESSMENT — MIFFLIN-ST. JEOR: SCORE: 948.72

## 2019-08-20 NOTE — PROGRESS NOTES
SUBJECTIVE:   Juanita Galvan is a 63 year old female presenting with a chief complaint of   Chief Complaint   Patient presents with     Nose Bleeds     x a week--had seasonal allergies couple weeks ago and after that nose started bleeding        She is an established patient of [facility].    URI Adult    Onset of symptoms was 1 week (s) ago. has been having nose bleeding after sneezing.   Course of illness is worsening.    Severity moderate  Current and Associated symptoms: runny nose and sneezing  Treatment measures tried include None tried.  Predisposing factors include None.      Review of Systems   Constitutional: Negative for chills and fever.   HENT: Positive for congestion and sneezing. Negative for ear pain, rhinorrhea and sore throat.    Respiratory: Negative for cough and shortness of breath.    Gastrointestinal: Negative for diarrhea, nausea and vomiting.   Neurological: Negative for headaches.   All other systems reviewed and are negative.      Past Medical History:   Diagnosis Date     Lipoma      Melasma      Vitreous floaters 3/5/2013     Family History   Problem Relation Age of Onset     Cancer No family hx of      Diabetes No family hx of      Hypertension No family hx of      Cerebrovascular Disease No family hx of      Thyroid Disease No family hx of      Glaucoma No family hx of      Macular Degeneration No family hx of      Current Outpatient Medications   Medication Sig Dispense Refill     fluticasone (FLONASE) 50 MCG/ACT nasal spray Spray 1-2 sprays into both nostrils daily for 7 days 9.9 mL 0     Oxymetazoline HCl (NASAL SPRAY NA)        sodium chloride (OCEAN) 0.65 % nasal spray 1 spray 3 times a day 30 mL 0     Emollient (CETAPHIL MOISTURIZING) CREA Externally apply topically daily (Patient not taking: Reported on 10/21/2018) 453 g 1     Omega-3 Fatty Acids (OMEGA 3 PO)        triamcinolone (KENALOG) 0.1 % cream Apply sparingly to affected area three times daily as needed (Patient not taking:  "Reported on 10/21/2018) 45 g 1     Social History     Tobacco Use     Smoking status: Never Smoker     Smokeless tobacco: Never Used   Substance Use Topics     Alcohol use: No       OBJECTIVE  /79 (BP Location: Left arm, Patient Position: Sitting, Cuff Size: Adult Regular)   Pulse 72   Temp 98.1  F (36.7  C) (Oral)   Resp 16   Ht 1.499 m (4' 11\")   Wt 47.8 kg (105 lb 6.4 oz)   SpO2 99%   BMI 21.29 kg/m      Physical Exam   Constitutional: No distress.   HENT:   Head: Normocephalic and atraumatic.   Right Ear: Tympanic membrane and external ear normal.   Left Ear: Tympanic membrane and external ear normal.   Nose: Mucosal edema, rhinorrhea and sinus tenderness present.   Mouth/Throat: Oropharynx is clear and moist.   Eyes: Pupils are equal, round, and reactive to light. EOM are normal.   Neck: Normal range of motion. Neck supple.   Pulmonary/Chest: Effort normal and breath sounds normal. No respiratory distress.   Lymphadenopathy:     She has no cervical adenopathy.   Neurological: She is alert. No cranial nerve deficit.   Skin: Skin is warm and dry. She is not diaphoretic.   Psychiatric: She has a normal mood and affect.   Nursing note and vitals reviewed.      ASSESSMENT:      ICD-10-CM    1. Seasonal allergic rhinitis due to other allergic trigger J30.89 fluticasone (FLONASE) 50 MCG/ACT nasal spray   2. Nasal congestion R09.81 sodium chloride (OCEAN) 0.65 % nasal spray        PLAN:  Discussed symptoms due to allergies  Advised to avoid allergens if known  Increase hydration, rest  Antihistamine as advised  Side effects of medications discussed  OTC medication discussed  Follow up with PCP if symptoms are persisting in 3 days or sooner if getting worse.   Questions are answered and patient is in agreement with treatment plan.        Patient Instructions       Patient Education     Understanding Nasal Anatomy: Inside View  A lot happens under the surface of the nose. The bone and cartilage under the " "skin give the nose most of its size and shape. Other structures inside and behind the nose help you breathe. Learning the anatomy of the nose can help you better understand how the nose works.           Bone supports the upper third (bridge) of the nose. The upper cartilage supports the side of the nose. The lower cartilage adds support, width, and height. It helps shape the nostrils and the tip of the nose. Skin also helps shape the nose.     The nasal cavity is a hollow space behind the nose that air flows through. The septum is a thin \"wall\" made of cartilage and bone. It divides the inside of the nose into two chambers. The mucous membrane is thin tissue that lines the nose, sinuses, and throat. It warms and moistens the air you breathe in. It also makes the sticky mucus that helps clean the air of dust and other small particles. The turbinates on each side of the nose are curved, bony ridges lined with mucous membrane. They warm and moisten the air you breathe in. The sinuses are hollow, air-filled chambers in the bone around your nose. Mucus from the sinuses drains into the nasal cavity.  Date Last Reviewed: 11/1/2016 2000-2018 [employer]. [address] [place] [address]. All rights reserved. This information is not intended as a substitute for professional medical care. Always follow your healthcare professional's instructions.           Patient Education     Allergic Rhinitis  Allergic rhinitis is an allergic reaction that affects the nose, and often the eyes. It s often known as nasal allergies. Nasal allergies are often due to things in the environment that are breathed in. Depending what you are sensitive to, nasal allergies may occur only during certain seasons. Or they may occur year round. Common indoor allergens include house dust mites, mold, cockroaches, and pet dander. Outdoor allergens include pollen from trees, grasses, and weeds.   Symptoms include a drippy, stuffy, " and itchy nose. They also include sneezing and red and itchy eyes. You may feel tired more often. Severe allergies may also affect your breathing and trigger a condition called asthma.   Tests can be done to see what allergens are affecting you. You may be referred to an allergy specialist for testing and further evaluation.  Home care  Your healthcare provider may prescribe medicines to help relieve allergy symptoms. These may include oral medicines, nasal sprays, or eye drops.  Ask your provider for advice on how to avoid substances that you are allergic to. Below are a few tips for each type of allergen.  Pet dander:    Do not have pets with fur and feathers.    If you can't avoid having a pet, keep it out of your bedroom and off upholstered furniture.  Pollen:    When pollen counts are high, keep windows of your car and home closed. If possible, use an air conditioner instead.    Wear a filter mask when mowing or doing yard work.  House dust mites:    Wash bedding every week in warm water and detergent and dry on a hot setting.    Cover the mattress, box spring, and pillows with allergy covers.     If possible, sleep in a room with no carpet, curtains, or upholstered furniture.  Cockroaches:    Store food in sealed containers.    Remove garbage from the home promptly.    Fix water leaks  Mold:    Keep humidity low by using a dehumidifier or air conditioner. Keep the dehumidifier and air conditioner clean and free of mold.    Clean moldy areas with bleach and water.  In general:    Vacuum once or twice a week. If possible, use a vacuum with a high-efficiency particulate air (HEPA) filter.    Do not smoke. Avoid cigarette smoke. Cigarette smoke is an irritant that can make symptoms worse.  Follow-up care  Follow up as advised by the healthcare provider or our staff. If you were referred to an allergy specialist, make this appointment promptly.  When to seek medical advice  Call your healthcare provider right away  if the following occur:    Coughing or wheezing    Fever of 100.4 F (38 C) or higher, or as directed by your healthcare provider    Raised red bumps (hives)    Continuing symptoms, new symptoms, or worsening symptoms  Call 911 if you have:    Trouble breathing    Severe swelling of the face or severe itching of the eyes or mouth  Date Last Reviewed: 3/1/2017    0237-7886 [employer]. [address] [place] [address]. All rights reserved. This information is not intended as a substitute for professional medical care. Always follow your healthcare professional's instructions.

## 2019-08-20 NOTE — PATIENT INSTRUCTIONS
"  Patient Education     Understanding Nasal Anatomy: Inside View  A lot happens under the surface of the nose. The bone and cartilage under the skin give the nose most of its size and shape. Other structures inside and behind the nose help you breathe. Learning the anatomy of the nose can help you better understand how the nose works.           Bone supports the upper third (bridge) of the nose. The upper cartilage supports the side of the nose. The lower cartilage adds support, width, and height. It helps shape the nostrils and the tip of the nose. Skin also helps shape the nose.     The nasal cavity is a hollow space behind the nose that air flows through. The septum is a thin \"wall\" made of cartilage and bone. It divides the inside of the nose into two chambers. The mucous membrane is thin tissue that lines the nose, sinuses, and throat. It warms and moistens the air you breathe in. It also makes the sticky mucus that helps clean the air of dust and other small particles. The turbinates on each side of the nose are curved, bony ridges lined with mucous membrane. They warm and moisten the air you breathe in. The sinuses are hollow, air-filled chambers in the bone around your nose. Mucus from the sinuses drains into the nasal cavity.  Date Last Reviewed: 11/1/2016 2000-2018 [employer]. [address] [place] [address]. All rights reserved. This information is not intended as a substitute for professional medical care. Always follow your healthcare professional's instructions.           Patient Education     Allergic Rhinitis  Allergic rhinitis is an allergic reaction that affects the nose, and often the eyes. It s often known as nasal allergies. Nasal allergies are often due to things in the environment that are breathed in. Depending what you are sensitive to, nasal allergies may occur only during certain seasons. Or they may occur year round. Common indoor allergens include house dust " mites, mold, cockroaches, and pet dander. Outdoor allergens include pollen from trees, grasses, and weeds.   Symptoms include a drippy, stuffy, and itchy nose. They also include sneezing and red and itchy eyes. You may feel tired more often. Severe allergies may also affect your breathing and trigger a condition called asthma.   Tests can be done to see what allergens are affecting you. You may be referred to an allergy specialist for testing and further evaluation.  Home care  Your healthcare provider may prescribe medicines to help relieve allergy symptoms. These may include oral medicines, nasal sprays, or eye drops.  Ask your provider for advice on how to avoid substances that you are allergic to. Below are a few tips for each type of allergen.  Pet dander:    Do not have pets with fur and feathers.    If you can't avoid having a pet, keep it out of your bedroom and off upholstered furniture.  Pollen:    When pollen counts are high, keep windows of your car and home closed. If possible, use an air conditioner instead.    Wear a filter mask when mowing or doing yard work.  House dust mites:    Wash bedding every week in warm water and detergent and dry on a hot setting.    Cover the mattress, box spring, and pillows with allergy covers.     If possible, sleep in a room with no carpet, curtains, or upholstered furniture.  Cockroaches:    Store food in sealed containers.    Remove garbage from the home promptly.    Fix water leaks  Mold:    Keep humidity low by using a dehumidifier or air conditioner. Keep the dehumidifier and air conditioner clean and free of mold.    Clean moldy areas with bleach and water.  In general:    Vacuum once or twice a week. If possible, use a vacuum with a high-efficiency particulate air (HEPA) filter.    Do not smoke. Avoid cigarette smoke. Cigarette smoke is an irritant that can make symptoms worse.  Follow-up care  Follow up as advised by the healthcare provider or our staff. If you  were referred to an allergy specialist, make this appointment promptly.  When to seek medical advice  Call your healthcare provider right away if the following occur:    Coughing or wheezing    Fever of 100.4 F (38 C) or higher, or as directed by your healthcare provider    Raised red bumps (hives)    Continuing symptoms, new symptoms, or worsening symptoms  Call 911 if you have:    Trouble breathing    Severe swelling of the face or severe itching of the eyes or mouth  Date Last Reviewed: 3/1/2017    1526-3391 [employer]. [address] [place], [place] [id number]. All rights reserved. This information is not intended as a substitute for professional medical care. Always follow your healthcare professional's instructions.

## 2019-08-29 ENCOUNTER — OFFICE VISIT (OUTPATIENT)
Dept: URGENT CARE | Facility: URGENT CARE | Age: 64
End: 2019-08-29
Payer: COMMERCIAL

## 2019-08-29 VITALS
OXYGEN SATURATION: 100 % | SYSTOLIC BLOOD PRESSURE: 113 MMHG | TEMPERATURE: 97.7 F | BODY MASS INDEX: 20.84 KG/M2 | HEART RATE: 74 BPM | WEIGHT: 103.2 LBS | DIASTOLIC BLOOD PRESSURE: 67 MMHG

## 2019-08-29 DIAGNOSIS — Z87.898 HX OF EPISTAXIS: Primary | ICD-10-CM

## 2019-08-29 PROCEDURE — 99213 OFFICE O/P EST LOW 20 MIN: CPT | Performed by: FAMILY MEDICINE

## 2019-08-29 ASSESSMENT — ENCOUNTER SYMPTOMS
CONSTIPATION: 0
SHORTNESS OF BREATH: 0
CHILLS: 0
EYE PAIN: 0
HEADACHES: 0
SINUS PRESSURE: 0
SORE THROAT: 0
SINUS PAIN: 0
COUGH: 0
NAUSEA: 0
DIARRHEA: 0
FEVER: 0
PALPITATIONS: 0
RHINORRHEA: 0
ABDOMINAL PAIN: 0
VOMITING: 0
VOICE CHANGE: 0

## 2019-08-29 NOTE — PROGRESS NOTES
"SUBJECTIVE:   Juanita Galvan is a 63 year old female presenting with a chief complaint of   Chief Complaint   Patient presents with     Nose Bleeds     Patient complains of nose bleed        Having nose bleed with sneezing. Nose bleeding x 1 per day lasting less than 30 seconds and usually using a Q tip prior to the bleeding episode. Left nares seems itchy at times and using Q tip to scratch the area gently with petroleum jelly. Last nasal bleed was small volume and brief yesterday     Was using Afrin x 3 days and appropriately stopped at that time.   Nasal saline rinse --  Once at night   flonase use once daily -- \"seems to help\"      3 years ago had a nasal surgery. Plastic surgery of the nose.       Review of Systems   Constitutional: Negative for chills and fever.   HENT: Negative for congestion, ear pain, rhinorrhea, sinus pressure (denies smell changes or problems such as anosmia ), sinus pain, sore throat, tinnitus and voice change.    Eyes: Negative for pain and visual disturbance.   Respiratory: Negative for cough and shortness of breath.    Cardiovascular: Negative for chest pain and palpitations.   Gastrointestinal: Negative for abdominal pain, constipation, diarrhea, nausea and vomiting.   Allergic/Immunologic: Positive for environmental allergies (possibly with sneezing but never been tested ). Negative for food allergies and immunocompromised state.   Neurological: Negative for headaches.       Past Medical History:   Diagnosis Date     Lipoma      Melasma      Vitreous floaters 3/5/2013     Family History   Problem Relation Age of Onset     Cancer No family hx of      Diabetes No family hx of      Hypertension No family hx of      Cerebrovascular Disease No family hx of      Thyroid Disease No family hx of      Glaucoma No family hx of      Macular Degeneration No family hx of      Current Outpatient Medications   Medication Sig Dispense Refill     Omega-3 Fatty Acids (OMEGA 3 PO)        Oxymetazoline " HCl (NASAL SPRAY NA)        sodium chloride (OCEAN) 0.65 % nasal spray 1 spray 3 times a day 30 mL 0     Emollient (CETAPHIL MOISTURIZING) CREA Externally apply topically daily (Patient not taking: Reported on 10/21/2018) 453 g 1     triamcinolone (KENALOG) 0.1 % cream Apply sparingly to affected area three times daily as needed (Patient not taking: Reported on 10/21/2018) 45 g 1     Social History     Tobacco Use     Smoking status: Never Smoker     Smokeless tobacco: Never Used   Substance Use Topics     Alcohol use: No       OBJECTIVE  /67 (BP Location: Left arm, Patient Position: Chair, Cuff Size: Adult Regular)   Pulse 74   Temp 97.7  F (36.5  C) (Oral)   Wt 46.8 kg (103 lb 3.2 oz)   SpO2 100%   BMI 20.84 kg/m      Physical Exam   Constitutional: She is oriented to person, place, and time.   HENT:   Head: Normocephalic and atraumatic.   Right Ear: External ear normal.   Left Ear: External ear normal.   Nose: Rhinorrhea (minimal ) present. No mucosal edema, septal deviation or nasal septal hematoma. No epistaxis.  No foreign bodies. Right sinus exhibits no maxillary sinus tenderness and no frontal sinus tenderness. Left sinus exhibits no maxillary sinus tenderness and no frontal sinus tenderness.   Mouth/Throat: Oropharynx is clear and moist. No oropharyngeal exudate.   Sinus mucosa without any evidence of nasal bleeding or epistaxis    Eyes: Pupils are equal, round, and reactive to light. Conjunctivae are normal. Right eye exhibits no discharge. Left eye exhibits no discharge. No scleral icterus.   Neck: Neck supple. No tracheal deviation present. No thyromegaly present.   Cardiovascular: Normal rate, regular rhythm, normal heart sounds and intact distal pulses. Exam reveals no gallop and no friction rub.   No murmur heard.  Pulmonary/Chest: Effort normal and breath sounds normal. No stridor. No respiratory distress. She has no wheezes. She has no rales. She exhibits no tenderness.   Abdominal: Soft.  Bowel sounds are normal. She exhibits no distension and no mass. There is no tenderness. There is no rebound and no guarding.   Musculoskeletal: She exhibits no edema, tenderness or deformity.   Lymphadenopathy:     She has no cervical adenopathy.   Neurological: She is alert and oriented to person, place, and time. No cranial nerve deficit.   Skin: Skin is warm and dry. No rash noted. No erythema.   Psychiatric: Judgment normal.         ASSESSMENT:    ICD-10-CM    1. Hx of epistaxis Z87.898     --likely traumatic in nature from the use of Q tip inside the nose       PLAN:  May continue the nasal saline.avoid placing anything including finger inside the nose.   May consider specific allergy testing if sneezing or allergy symptoms worsen or become more common.   May continue flonase if helpful  Consider ENT follow-up if persisting or worsening epistaxis.   Gopi Cartagena MD

## 2020-01-25 ENCOUNTER — ANCILLARY PROCEDURE (OUTPATIENT)
Dept: GENERAL RADIOLOGY | Facility: CLINIC | Age: 65
End: 2020-01-25
Attending: FAMILY MEDICINE
Payer: COMMERCIAL

## 2020-01-25 ENCOUNTER — OFFICE VISIT (OUTPATIENT)
Dept: URGENT CARE | Facility: URGENT CARE | Age: 65
End: 2020-01-25
Payer: COMMERCIAL

## 2020-01-25 VITALS
HEART RATE: 74 BPM | DIASTOLIC BLOOD PRESSURE: 82 MMHG | OXYGEN SATURATION: 100 % | TEMPERATURE: 98.2 F | BODY MASS INDEX: 21.27 KG/M2 | WEIGHT: 105.3 LBS | SYSTOLIC BLOOD PRESSURE: 142 MMHG

## 2020-01-25 DIAGNOSIS — M62.838 MUSCLE SPASMS OF NECK: ICD-10-CM

## 2020-01-25 DIAGNOSIS — M54.2 NECK PAIN: Primary | ICD-10-CM

## 2020-01-25 PROCEDURE — 72040 X-RAY EXAM NECK SPINE 2-3 VW: CPT

## 2020-01-25 PROCEDURE — 99214 OFFICE O/P EST MOD 30 MIN: CPT | Performed by: FAMILY MEDICINE

## 2020-01-25 RX ORDER — CYCLOBENZAPRINE HCL 10 MG
10 TABLET ORAL
Qty: 10 TABLET | Refills: 1 | Status: SHIPPED | OUTPATIENT
Start: 2020-01-25 | End: 2020-02-04

## 2020-01-25 ASSESSMENT — ENCOUNTER SYMPTOMS
NECK PAIN: 1
EYE PAIN: 0
CHILLS: 0
SORE THROAT: 0
COLOR CHANGE: 0
BACK PAIN: 0
HEMATURIA: 0
ARTHRALGIAS: 0
DYSURIA: 0
SEIZURES: 0
PALPITATIONS: 0
COUGH: 0
VOMITING: 0
ABDOMINAL PAIN: 0
FEVER: 0
SHORTNESS OF BREATH: 0

## 2020-01-25 NOTE — PROGRESS NOTES
SUBJECTIVE:   Juanita Galvan is a 64 year old female presenting with a chief complaint of   Chief Complaint   Patient presents with     Mass     Patient complains of pain and  lump on back        She is an established patient of [facility].    7 days of neck pain at the C7 level approximately. Left neck and deltoid and biceps soreness over the past 2 days.       Work in . Often sitting with microscope     Review of Systems   Constitutional: Negative for chills and fever.   HENT: Negative for ear pain and sore throat.    Eyes: Negative for pain and visual disturbance.   Respiratory: Negative for cough and shortness of breath.    Cardiovascular: Negative for chest pain and palpitations.   Gastrointestinal: Negative for abdominal pain and vomiting.   Genitourinary: Negative for dysuria and hematuria.   Musculoskeletal: Positive for neck pain. Negative for arthralgias and back pain.   Skin: Negative for color change and rash.   Neurological: Negative for seizures and syncope.   All other systems reviewed and are negative.      Past Medical History:   Diagnosis Date     Lipoma      Melasma      Vitreous floaters 3/5/2013     Family History   Problem Relation Age of Onset     Cancer No family hx of      Diabetes No family hx of      Hypertension No family hx of      Cerebrovascular Disease No family hx of      Thyroid Disease No family hx of      Glaucoma No family hx of      Macular Degeneration No family hx of      Current Outpatient Medications   Medication Sig Dispense Refill     Emollient (CETAPHIL MOISTURIZING) CREA Externally apply topically daily (Patient not taking: Reported on 10/21/2018) 453 g 1     Omega-3 Fatty Acids (OMEGA 3 PO)        Oxymetazoline HCl (NASAL SPRAY NA)        sodium chloride (OCEAN) 0.65 % nasal spray 1 spray 3 times a day (Patient not taking: Reported on 1/25/2020) 30 mL 0     triamcinolone (KENALOG) 0.1 % cream Apply sparingly to affected area three times daily as needed  (Patient not taking: Reported on 10/21/2018) 45 g 1     Social History     Tobacco Use     Smoking status: Never Smoker     Smokeless tobacco: Never Used   Substance Use Topics     Alcohol use: No       OBJECTIVE  BP (!) 142/82 (BP Location: Left arm, Patient Position: Chair, Cuff Size: Adult Regular)   Pulse 74   Temp 98.2  F (36.8  C) (Oral)   Wt 47.8 kg (105 lb 4.8 oz)   SpO2 100%   BMI 21.27 kg/m      Physical Exam  HENT:      Head: Normocephalic and atraumatic.      Right Ear: External ear normal.      Left Ear: External ear normal.      Nose: Nose normal.      Mouth/Throat:      Pharynx: No oropharyngeal exudate.   Eyes:      General: No scleral icterus.        Right eye: No discharge.         Left eye: No discharge.      Conjunctiva/sclera: Conjunctivae normal.      Pupils: Pupils are equal, round, and reactive to light.   Neck:      Musculoskeletal: Neck supple. No spinous process tenderness or muscular tenderness.      Thyroid: No thyromegaly.      Trachea: Trachea normal. No tracheal deviation.      Comments: Noted soft tissue tenderness and minimal skin swelling overlying the lower cervical vertebrae.     Without sign of redness of infection or fluctuance.   Cardiovascular:      Rate and Rhythm: Normal rate and regular rhythm.      Heart sounds: Normal heart sounds. No murmur. No friction rub. No gallop.    Pulmonary:      Effort: Pulmonary effort is normal. No respiratory distress.      Breath sounds: Normal breath sounds. No stridor. No wheezing or rales.   Chest:      Chest wall: No tenderness.   Abdominal:      General: Bowel sounds are normal. There is no distension.      Palpations: Abdomen is soft. There is no mass.      Tenderness: There is no abdominal tenderness. There is no guarding or rebound.   Musculoskeletal:         General: No tenderness or deformity.   Lymphadenopathy:      Cervical: No cervical adenopathy.      Right cervical: No superficial, deep or posterior cervical  adenopathy.     Left cervical: No superficial, deep or posterior cervical adenopathy.   Skin:     General: Skin is warm and dry.      Findings: No erythema or rash.   Neurological:      Mental Status: She is alert and oriented to person, place, and time.      Cranial Nerves: No cranial nerve deficit.   Psychiatric:         Judgment: Judgment normal.       CERVICAL SPINE THREE VIEWS  1/25/2020 9:47 AM      HISTORY: Denies any known injury. Neck pain.     COMPARISON: 2/22/2013                                                                      IMPRESSION: Mild gentle upper to mid cervical kyphosis is present.  Posterior alignment is otherwise normal. There is some degenerative  facet disease at C7-T1. Minimal degenerative disc disease is noted at  C5-C6. No evidence for fracture.     JESSE TORIBIO MD    ASSESSMENT:    ICD-10-CM    1. Neck pain M54.2 XR Cervical Spine 2/3 Views   2. Muscle spasms of neck M62.838 cyclobenzaprine (FLEXERIL) 10 MG tablet        PLAN:  Likely referred pain from arthritis in the neck  Discussed use of OTC pain medicine. Trial of bedtime muscle relaxant as above.   The patient indicates understanding of these issues and agrees with the plan.    Patient educational/instructional material provided including reasons for follow-up   Gopi Cartagena MD

## 2020-06-19 ENCOUNTER — ANCILLARY PROCEDURE (OUTPATIENT)
Dept: MAMMOGRAPHY | Facility: CLINIC | Age: 65
End: 2020-06-19
Attending: PHYSICIAN ASSISTANT
Payer: COMMERCIAL

## 2020-06-19 DIAGNOSIS — Z12.31 VISIT FOR SCREENING MAMMOGRAM: ICD-10-CM

## 2020-06-19 PROCEDURE — 77067 SCR MAMMO BI INCL CAD: CPT | Mod: TC

## 2020-08-13 ENCOUNTER — TELEPHONE (OUTPATIENT)
Dept: OTOLARYNGOLOGY | Facility: CLINIC | Age: 65
End: 2020-08-13

## 2020-08-13 ENCOUNTER — OFFICE VISIT (OUTPATIENT)
Dept: OTOLARYNGOLOGY | Facility: CLINIC | Age: 65
End: 2020-08-13
Payer: COMMERCIAL

## 2020-08-13 VITALS
HEART RATE: 72 BPM | WEIGHT: 109.8 LBS | DIASTOLIC BLOOD PRESSURE: 60 MMHG | TEMPERATURE: 98.1 F | BODY MASS INDEX: 22.18 KG/M2 | SYSTOLIC BLOOD PRESSURE: 133 MMHG | OXYGEN SATURATION: 95 %

## 2020-08-13 DIAGNOSIS — T85.79XA INFECTION ASSOCIATED WITH IMPLANT (H): Primary | ICD-10-CM

## 2020-08-13 DIAGNOSIS — Z98.890 HISTORY OF RHINOPLASTY: ICD-10-CM

## 2020-08-13 PROCEDURE — 99204 OFFICE O/P NEW MOD 45 MIN: CPT | Performed by: OTOLARYNGOLOGY

## 2020-08-13 RX ORDER — CLINDAMYCIN PHOSPHATE 10 MG/G
GEL TOPICAL 2 TIMES DAILY
Qty: 30 G | Refills: 4 | Status: SHIPPED | OUTPATIENT
Start: 2020-08-13 | End: 2020-08-27

## 2020-08-13 NOTE — LETTER
8/13/2020         RE: Juanita Galvan  [address]  [facility] [address]        Dear Colleague,    Thank you for referring your patient, Juanita Galvan, to the [facility]. Please see a copy of my visit note below.    History of Present Illness - Juanita Galvan is a 64 year old female here to see me for the first time for nosebleeds.    ABout one year ago she started to have nosebleeds almost every day.  She admits that she uses a qtip to clear crusting from the LEFT side every day after the shower and finds blood.  There isn't free flowing bleeding at all.     There was no prodromal illness, no history of recent or remote facial trauma.  No recent changes in nasal airway, new onset changes in vision, no new headache or other neurological symptoms.  No history of bleeding disorders or easy bruising.    She had plastic surgery in 2011 in [place].  She had a silicon dorsal splint placed, she recalls specifically.    Past Medical History -   Patient Active Problem List   Diagnosis     CARDIOVASCULAR SCREENING; LDL GOAL LESS THAN 160     Vitreous floaters     Advanced directives, counseling/discussion     Right foot pain     Screening for cervical cancer       Current Medications -   Current Outpatient Medications:      Emollient (CETAPHIL MOISTURIZING) CREA, Externally apply topically daily (Patient not taking: Reported on 10/21/2018), Disp: 453 g, Rfl: 1     Omega-3 Fatty Acids (OMEGA 3 PO), , Disp: , Rfl:      Oxymetazoline HCl (NASAL SPRAY NA), , Disp: , Rfl:      sodium chloride (OCEAN) 0.65 % nasal spray, 1 spray 3 times a day (Patient not taking: Reported on 1/25/2020), Disp: 30 mL, Rfl: 0     triamcinolone (KENALOG) 0.1 % cream, Apply sparingly to affected area three times daily as needed (Patient not taking: Reported on 10/21/2018), Disp: 45 g, Rfl: 1    Allergies -   Allergies   Allergen Reactions     Nkda [No Known Drug Allergies]      Seasonal Allergies        Social History -   Social  History     Socioeconomic History     Marital status:      Spouse name: Not on file     Number of children: Not on file     Years of education: Not on file     Highest education level: Not on file   Occupational History     Not on file   Social Needs     Financial resource strain: Not on file     Food insecurity     Worry: Not on file     Inability: Not on file     Transportation needs     Medical: Not on file     Non-medical: Not on file   Tobacco Use     Smoking status: Never Smoker     Smokeless tobacco: Never Used   Substance and Sexual Activity     Alcohol use: No     Drug use: No     Sexual activity: Not Currently     Partners: Male   Lifestyle     Physical activity     Days per week: Not on file     Minutes per session: Not on file     Stress: Not on file   Relationships     Social connections     Talks on phone: Not on file     Gets together: Not on file     Attends [employer] service: Not on file     Active member of club or organization: Not on file     Attends meetings of clubs or organizations: Not on file     Relationship status: Not on file     Intimate partner violence     Fear of current or ex partner: Not on file     Emotionally abused: Not on file     Physically abused: Not on file     Forced sexual activity: Not on file   Other Topics Concern     Parent/sibling w/ CABG, MI or angioplasty before 65F 55M? No   Social History Narrative     Not on file       Family History -   Family History   Problem Relation Age of Onset     Cancer No family hx of      Diabetes No family hx of      Hypertension No family hx of      Cerebrovascular Disease No family hx of      Thyroid Disease No family hx of      Glaucoma No family hx of      Macular Degeneration No family hx of        Review of Systems - As per HPI and PMHx, otherwise 10+ system review of the head and neck, and general constitution is negative.    Physical Exam  /60   Pulse 72   Temp 98.1  F (36.7  C) (Oral)   Wt 49.8 kg (109 lb 12.8  oz)   SpO2 95%   BMI 22.18 kg/m      General - The patient is well nourished and well developed, and appears to have good nutritional status.  Alert and oriented to person and place, answers questions and cooperates with examination appropriately.   Head and Face - Normocephalic and atraumatic, with no gross asymmetry noted of the contour of the facial features.  The facial nerve is intact, with strong symmetric movements.  Voice and Breathing - The patient was breathing comfortably without the use of accessory muscles. There was no wheezing, stridor, or stertor.  The patients voice was clear and strong, and had appropriate pitch and quality.  Ears - The tympanic membranes are normal in appearance, bony landmarks are intact.  No retraction, perforation, or masses.  No fluid or purulence was seen in the external canal or the middle ear. No evidence of infection of the middle ear or external canal, cerumen was normal in appearance.  Eyes - Extraocular movements intact, and the pupils were reactive to light.  Sclera were not icteric or injected, conjunctiva were pink and moist.  Mouth - Examination of the oral cavity showed pink, healthy oral mucosa. No lesions or ulcerations noted.  The tongue was mobile and midline, and the dentition were in good condition.    Throat - The walls of the oropharynx were smooth, pink, moist, symmetric, and had no lesions or ulcerations.  The tonsillar pillars and soft palate were symmetric.  The uvula was midline on elevation.      Nose - External contour is symmetric, no gross deflection or scars.  Nasal mucosa is pink and moist with no abnormal mucus.  The septum was midline and non-obstructive, turbinates of normal size and position.  No polyps, masses, or purulence noted on examination. However, the area of interest is at the apex of the LEFT nasal vestibule.  There was a small piled up region and a crust that I gently cleaned with peroxide.  This revealed a small light yellow  firm substance.  Gentle manipulation yielded a tiny amount of cloudy material        A/P  - Juanita Nat Galvan is a 64 year old male  (Z98.890) History of rhinoplasty  (T85.79XA) Infection associated with implant (H)    The problem here is almost certainly that her previously placed silicon dorsal implant is extruding into the LEFT nasal vestibule.  There area has a bit of superficial infection I will treat with Cleocin Gel.    I have counseled her at length at what will almost certainly need to be done.  If lefft untreated the entire implant will eventually become infected as it travels up the capsule.  THis will lead to serious infection of the external nose and face.    I will refer to plastic surgery, as I believe this implant will need to be removed.  Also, I have prepped her expectations that it will likely need to be a two stage surgery, as once removed there is a good chance it will need to be allowed to heal before doing a second operation for revision rhinoplasty.  She and her  understood and will make an appointment with Dr. Amira Coles.    Again, thank you for allowing me to participate in the care of your patient.        Sincerely,        Peter Anderson MD

## 2020-08-13 NOTE — PROGRESS NOTES
History of Present Illness - Juanita Galvan is a 64 year old female here to see me for the first time for nosebleeds.    ABout one year ago she started to have nosebleeds almost every day.  She admits that she uses a qtip to clear crusting from the LEFT side every day after the shower and finds blood.  There isn't free flowing bleeding at all.     There was no prodromal illness, no history of recent or remote facial trauma.  No recent changes in nasal airway, new onset changes in vision, no new headache or other neurological symptoms.  No history of bleeding disorders or easy bruising.    She had plastic surgery in 2011 in [place].  She had a silicon dorsal splint placed, she recalls specifically.    Past Medical History -   Patient Active Problem List   Diagnosis     CARDIOVASCULAR SCREENING; LDL GOAL LESS THAN 160     Vitreous floaters     Advanced directives, counseling/discussion     Right foot pain     Screening for cervical cancer       Current Medications -   Current Outpatient Medications:      Emollient (CETAPHIL MOISTURIZING) CREA, Externally apply topically daily (Patient not taking: Reported on 10/21/2018), Disp: 453 g, Rfl: 1     Omega-3 Fatty Acids (OMEGA 3 PO), , Disp: , Rfl:      Oxymetazoline HCl (NASAL SPRAY NA), , Disp: , Rfl:      sodium chloride (OCEAN) 0.65 % nasal spray, 1 spray 3 times a day (Patient not taking: Reported on 1/25/2020), Disp: 30 mL, Rfl: 0     triamcinolone (KENALOG) 0.1 % cream, Apply sparingly to affected area three times daily as needed (Patient not taking: Reported on 10/21/2018), Disp: 45 g, Rfl: 1    Allergies -   Allergies   Allergen Reactions     Nkda [No Known Drug Allergies]      Seasonal Allergies        Social History -   Social History     Socioeconomic History     Marital status:      Spouse name: Not on file     Number of children: Not on file     Years of education: Not on file     Highest education level: Not on file   Occupational History     Not on file    Social Needs     Financial resource strain: Not on file     Food insecurity     Worry: Not on file     Inability: Not on file     Transportation needs     Medical: Not on file     Non-medical: Not on file   Tobacco Use     Smoking status: Never Smoker     Smokeless tobacco: Never Used   Substance and Sexual Activity     Alcohol use: No     Drug use: No     Sexual activity: Not Currently     Partners: Male   Lifestyle     Physical activity     Days per week: Not on file     Minutes per session: Not on file     Stress: Not on file   Relationships     Social connections     Talks on phone: Not on file     Gets together: Not on file     Attends Orthodox service: Not on file     Active member of club or organization: Not on file     Attends meetings of clubs or organizations: Not on file     Relationship status: Not on file     Intimate partner violence     Fear of current or ex partner: Not on file     Emotionally abused: Not on file     Physically abused: Not on file     Forced sexual activity: Not on file   Other Topics Concern     Parent/sibling w/ CABG, MI or angioplasty before 65F 55M? No   Social History Narrative     Not on file       Family History -   Family History   Problem Relation Age of Onset     Cancer No family hx of      Diabetes No family hx of      Hypertension No family hx of      Cerebrovascular Disease No family hx of      Thyroid Disease No family hx of      Glaucoma No family hx of      Macular Degeneration No family hx of        Review of Systems - As per HPI and PMHx, otherwise 10+ system review of the head and neck, and general constitution is negative.    Physical Exam  /60   Pulse 72   Temp 98.1  F (36.7  C) (Oral)   Wt 49.8 kg (109 lb 12.8 oz)   SpO2 95%   BMI 22.18 kg/m      General - The patient is well nourished and well developed, and appears to have good nutritional status.  Alert and oriented to person and place, answers questions and cooperates with examination  appropriately.   Head and Face - Normocephalic and atraumatic, with no gross asymmetry noted of the contour of the facial features.  The facial nerve is intact, with strong symmetric movements.  Voice and Breathing - The patient was breathing comfortably without the use of accessory muscles. There was no wheezing, stridor, or stertor.  The patients voice was clear and strong, and had appropriate pitch and quality.  Ears - The tympanic membranes are normal in appearance, bony landmarks are intact.  No retraction, perforation, or masses.  No fluid or purulence was seen in the external canal or the middle ear. No evidence of infection of the middle ear or external canal, cerumen was normal in appearance.  Eyes - Extraocular movements intact, and the pupils were reactive to light.  Sclera were not icteric or injected, conjunctiva were pink and moist.  Mouth - Examination of the oral cavity showed pink, healthy oral mucosa. No lesions or ulcerations noted.  The tongue was mobile and midline, and the dentition were in good condition.    Throat - The walls of the oropharynx were smooth, pink, moist, symmetric, and had no lesions or ulcerations.  The tonsillar pillars and soft palate were symmetric.  The uvula was midline on elevation.      Nose - External contour is symmetric, no gross deflection or scars.  Nasal mucosa is pink and moist with no abnormal mucus.  The septum was midline and non-obstructive, turbinates of normal size and position.  No polyps, masses, or purulence noted on examination. However, the area of interest is at the apex of the LEFT nasal vestibule.  There was a small piled up region and a crust that I gently cleaned with peroxide.  This revealed a small light yellow firm substance.  Gentle manipulation yielded a tiny amount of cloudy material        A/P  - Juanita Jainsobeida Galvan is a 64 year old male  (Z98.890) History of rhinoplasty  (T85.79XA) Infection associated with implant (H)    The problem here is  almost certainly that her previously placed silicon dorsal implant is extruding into the LEFT nasal vestibule.  There area has a bit of superficial infection I will treat with Cleocin Gel.    I have counseled her at length at what will almost certainly need to be done.  If lefft untreated the entire implant will eventually become infected as it travels up the capsule.  THis will lead to serious infection of the external nose and face.    I will refer to plastic surgery, as I believe this implant will need to be removed.  Also, I have prepped her expectations that it will likely need to be a two stage surgery, as once removed there is a good chance it will need to be allowed to heal before doing a second operation for revision rhinoplasty.  She and her  understood and will make an appointment with Dr. Amira Coles.

## 2020-08-13 NOTE — TELEPHONE ENCOUNTER
[facility] Call Center    Phone Message    May a detailed message be left on voicemail: yes     Reason for Call: Appointment Intake    Referring Provider Name: Peter Anderson  Diagnosis and/or Symptoms: removal of infected silicon dorsal nasal implant - wasn't sure how to schedule - referral was put in as a plastic surgery referral - Pt would like to be scheduled with Vicky.  Please follow up.     Action Taken: Message routed to:  [facility] ([facility] ENT    Travel Screening: Not Applicable

## 2020-08-14 NOTE — TELEPHONE ENCOUNTER
FUTURE VISIT INFORMATION      FUTURE VISIT INFORMATION:    Date: 8/28/2020    Time: 2:20PM    [place] [facility] [place]  REFERRAL INFORMATION:    Referring provider:  Peter Anderson MD     Referring providers clinic:   [facility]    Reason for visit/diagnosis  For removal of infected silicon dorsal nasal implant, and then subsequent reconstruction; referred per Dr. Peter Anderson, sched per pt.     RECORDS REQUESTED FROM:       Clinic name Comments Records Status Imaging Status    [facility] 8/13/2020 referral and note from Peter Anderson MD  [facility] 8/29/19 note from Dr Yadiel Erickson [facility]

## 2020-08-26 ENCOUNTER — PRE VISIT (OUTPATIENT)
Dept: OTOLARYNGOLOGY | Facility: CLINIC | Age: 65
End: 2020-08-26

## 2020-08-26 ENCOUNTER — OFFICE VISIT (OUTPATIENT)
Dept: OTOLARYNGOLOGY | Facility: CLINIC | Age: 65
End: 2020-08-26
Attending: OTOLARYNGOLOGY
Payer: COMMERCIAL

## 2020-08-26 VITALS
HEIGHT: 60 IN | WEIGHT: 105 LBS | BODY MASS INDEX: 20.62 KG/M2 | TEMPERATURE: 98.1 F | HEART RATE: 76 BPM | OXYGEN SATURATION: 99 %

## 2020-08-26 DIAGNOSIS — T85.79XA: ICD-10-CM

## 2020-08-26 DIAGNOSIS — J01.80 ACUTE NON-RECURRENT SINUSITIS OF OTHER SINUS: Primary | ICD-10-CM

## 2020-08-26 RX ORDER — SULFAMETHOXAZOLE/TRIMETHOPRIM 800-160 MG
1 TABLET ORAL 2 TIMES DAILY
Qty: 14 TABLET | Refills: 0 | Status: SHIPPED | OUTPATIENT
Start: 2020-08-26 | End: 2020-09-03

## 2020-08-26 ASSESSMENT — MIFFLIN-ST. JEOR: SCORE: 957.78

## 2020-08-26 ASSESSMENT — PAIN SCALES - GENERAL: PAINLEVEL: NO PAIN (0)

## 2020-08-26 NOTE — LETTER
8/26/2020       RE: Juanita Galvan  [address]  [place] [address]     Dear Colleague,    Thank you for referring your patient, Juanita Galvan, to the [facility] at [facility]. Please see a copy of my visit note below.    Facial Plastic and Reconstructive Surgery Consultation    Referring Provider:   Peter Anderson MD  [address]  [place] [id number]    HPI:   I had the pleasure of seeing Juanita Galvan today in clinic for consultation for extruding nasal implant.      Juanita Galvan is a 64 year old female who had a rhinoplasty in [place] to augment her dorsum 10 years ago. She has not had any trouble but recently developed some ulceration and tenderness in her left nostril.     She was assessed by Dr. Anderson who noted granulation tissue and concern for an extruding nasal implant. He placed her on topical nasal antibiotic.    She denies any tenderness over her nose, and has not pain. She also feels that the inside of her nose has improved with the ointment.         Review Of Systems  ROS: 10 point ROS neg other than the symptoms noted above in the HPI.    Patient Active Problem List   Diagnosis     CARDIOVASCULAR SCREENING; LDL GOAL LESS THAN 160     Vitreous floaters     Advanced directives, counseling/discussion     Right foot pain     Screening for cervical cancer     Infection associated with implant (H)     History of rhinoplasty     Infection of implant site (H)     Past Surgical History:   Procedure Laterality Date     COLONOSCOPY  3/26/2012    Procedure:COLONOSCOPY; COLONOSCOPY, SCREEN; Surgeon:MICAH RICHARDSON; Location: OR     NASAL ENDOSCOPY N/A 9/10/2020    Procedure: Removal of Nasal Implant, Rigid Nasal Endoscopy;  Surgeon: Yanci Perry MD;  Location: [facility] OR     NOSE SURGERY  2011    dorsal splint     Current Outpatient Medications   Medication Sig Dispense Refill     Emollient (CETAPHIL MOISTURIZING) CREA Externally  apply topically daily 453 g 1     Omega-3 Fatty Acids (OMEGA 3 PO)        triamcinolone (KENALOG) 0.1 % cream Apply sparingly to affected area three times daily as needed 45 g 1     calcium carbonate (TUMS) 500 MG chewable tablet Take 1 chew tab by mouth 2 times daily Pt takes as needed       ciprofloxacin (CIPRO) 500 MG tablet Take 1 tablet (500 mg) by mouth 2 times daily for 14 days 28 tablet 0     Multiple Vitamins-Minerals (MULTIVITAMIN ADULT PO) Take 1 tablet by mouth every morning       sodium chloride (OCEAN) 0.65 % nasal spray Spray both sides of nose every 2 hours as able 44 mL 1     Nkda [no known drug allergies] and Seasonal allergies  Social History     Socioeconomic History     Marital status:      Spouse name: Not on file     Number of children: 3     Years of education: Not on file     Highest education level: Not on file   Occupational History     Occupation: visual technician   Social Needs     Financial resource strain: Not on file     Food insecurity     Worry: Not on file     Inability: Not on file     Transportation needs     Medical: Not on file     Non-medical: Not on file   Tobacco Use     Smoking status: Never Smoker     Smokeless tobacco: Never Used   Substance and Sexual Activity     Alcohol use: No     Drug use: No     Sexual activity: Not Currently     Partners: Male   Lifestyle     Physical activity     Days per week: Not on file     Minutes per session: Not on file     Stress: Not on file   Relationships     Social connections     Talks on phone: Not on file     Gets together: Not on file     Attends Episcopal service: Not on file     Active member of club or organization: Not on file     Attends meetings of clubs or organizations: Not on file     Relationship status: Not on file     Intimate partner violence     Fear of current or ex partner: Not on file     Emotionally abused: Not on file     Physically abused: Not on file     Forced sexual activity: Not on file   Other Topics  Concern     Parent/sibling w/ CABG, MI or angioplasty before 65F 55M? No   Social History Narrative     Not on file     Family History   Problem Relation Age of Onset     No Known Problems Mother      No Known Problems Father      No Known Problems Brother      No Known Problems Sister      No Known Problems Sister      No Known Problems Sister      Cancer No family hx of      Diabetes No family hx of      Hypertension No family hx of      Cerebrovascular Disease No family hx of      Thyroid Disease No family hx of      Glaucoma No family hx of      Macular Degeneration No family hx of      Deep Vein Thrombosis No family hx of      Pulmonary Embolism No family hx of      Anesthesia Reaction No family hx of        PE:  Alert and Oriented, Answering Questions Appropriately  Atraumatic, Normocephalic, Face Symmetric  Skin: [place] 5  Facial Nerve Intact and facial movement symmetric  EOM's, PEERL  Nasal Exam: No external Deformity, notable post rhinoplasty scar in the soft tissue envelope, no erythema or drainage, no pain on palpation. Anterior rhinoscopy shows that high on the left nasal vestibule, at the anterior septal angle to margin transition she has a prominence of granulation tissue, no foreign body noted., no mucopurulence or polyps, no masses  Chin: Normal   Lips/Teeth/Toungue/Gums: Lips intact, Normal Dentition, Occlusion intact, Oral mucosa intact, no lesions/ulcerations/masses, Tongue mobile  Neuro/Psych: CN's 2-12 intact, Moves all extremities, ambulation in intact, positive affect, no notable muscle weakness          PROCEDURE:diagnostic rigid nasal endoscopy   Indication: nasal implant complications  Performed by: Yanci Perry MD      Nasal Endoscopy is performed to provide a more thorough evaluation of the nasal airway than seen on standard nasal speculum exam.  Findings are as follows:   Nasal passages: patent bilaterally, no purulence, no purulence expressed, no areas of additional  granulation along the septum, septum intact, bilateral nasal passages clear of any foreign bodies   Middle Meatus: clear, no mucopurulence bilaterally, no polyps   Nasopharynx: clear, no lesions, no crusting or inflammation    She tolerated the procedure well without any complications.           IMPRESSION:    The primary encounter diagnosis was Acute non-recurrent sinusitis of other sinus. A diagnosis of Infection of implant site (H) was also pertinent to this visit.        PLAN:    Juanita Galvan has a likely extruding nasal implant.   Since she is quite asymptomatic over the dorsum and the implant extends over the dorsum, there may be possibility in salvaging some of it.     I will need to take he to the operating room, endonasally open up the ulcerated area and then address the implant as needed.     She understood and also understood the possible outcomes if the implant has to excised.     Photodocumentation was obtained.     I spent a total of 45 minutes face-to-face with Juanita Galvan during today's office visit.  Over 50% of this time was spent counseling the patient and/or coordinating care regarding nasal reconstruction and foreign body extrusion.  See note for details.        Again, thank you for allowing me to participate in the care of your patient.      Sincerely,    Yanci Perry MD

## 2020-08-27 RX ORDER — CLINDAMYCIN PHOSPHATE 900 MG/50ML
900 INJECTION, SOLUTION INTRAVENOUS SEE ADMIN INSTRUCTIONS
Status: CANCELLED | OUTPATIENT
Start: 2020-08-27

## 2020-08-27 RX ORDER — CLINDAMYCIN PHOSPHATE 900 MG/50ML
900 INJECTION, SOLUTION INTRAVENOUS
Status: CANCELLED | OUTPATIENT
Start: 2020-08-27

## 2020-08-27 RX ORDER — CEFAZOLIN SODIUM 1 G/50ML
1 INJECTION, SOLUTION INTRAVENOUS SEE ADMIN INSTRUCTIONS
Status: CANCELLED | OUTPATIENT
Start: 2020-08-27

## 2020-08-27 RX ORDER — CEFAZOLIN SODIUM 2 G/50ML
2 SOLUTION INTRAVENOUS
Status: CANCELLED | OUTPATIENT
Start: 2020-08-27

## 2020-08-27 NOTE — NURSING NOTE
Pt. Signed the waiver to forego Interpretive Services while using the [place]  via the electronic tablet (see Media tab).    Pt prefers to have her daughter (who is present at the appt) interpret for her.    Photodocumentation obtained.    Dr. Perry prescribed Bactrim DS x 7 days.    Will work to schedule surgery for removal of infected nasal implant.    Pt. Would like to have surgery on 9/10/20, if possible.    Sondra Hermosillo RN  8/27/2020 11:01 AM

## 2020-08-28 ENCOUNTER — TELEPHONE (OUTPATIENT)
Dept: OTOLARYNGOLOGY | Facility: CLINIC | Age: 65
End: 2020-08-28

## 2020-08-28 DIAGNOSIS — Z11.59 ENCOUNTER FOR SCREENING FOR OTHER VIRAL DISEASES: Primary | ICD-10-CM

## 2020-08-28 PROBLEM — T85.79XA: Status: ACTIVE | Noted: 2020-08-28

## 2020-08-28 NOTE — TELEPHONE ENCOUNTER
Talked to Vale (daughter) regarding scheduling for her mother.     Surgeon: Yanci Coles MD  Date of Surgery: 09/10/2020  Location of surgery: [facility]  Pre-Op H&P: PAC appt declined video visit 9/3   Post-Op Appt Date: 1 week post op    Imaging needed:  No  Discussed COVID-19 testing:  Yes- patient was scheduled in . Due to the holiday, on Tuesday, 9/8  Pre-cert/Authorization completed:  No  Packet sent out: Yes  08/28/20      Lexie Pineda  08/28/20 4:05 PM  P: [phone]

## 2020-09-03 ENCOUNTER — OFFICE VISIT (OUTPATIENT)
Dept: SURGERY | Facility: CLINIC | Age: 65
End: 2020-09-03
Payer: COMMERCIAL

## 2020-09-03 ENCOUNTER — PRE VISIT (OUTPATIENT)
Dept: SURGERY | Facility: CLINIC | Age: 65
End: 2020-09-03

## 2020-09-03 ENCOUNTER — ANESTHESIA EVENT (OUTPATIENT)
Dept: SURGERY | Facility: AMBULATORY SURGERY CENTER | Age: 65
End: 2020-09-03

## 2020-09-03 VITALS
OXYGEN SATURATION: 100 % | RESPIRATION RATE: 16 BRPM | DIASTOLIC BLOOD PRESSURE: 75 MMHG | WEIGHT: 107 LBS | HEIGHT: 60 IN | TEMPERATURE: 98.2 F | HEART RATE: 67 BPM | BODY MASS INDEX: 21.01 KG/M2 | SYSTOLIC BLOOD PRESSURE: 121 MMHG

## 2020-09-03 DIAGNOSIS — Z01.818 PREOP EXAMINATION: Primary | ICD-10-CM

## 2020-09-03 DIAGNOSIS — T85.79XA: ICD-10-CM

## 2020-09-03 DIAGNOSIS — Z01.818 PREOP EXAMINATION: ICD-10-CM

## 2020-09-03 LAB
CREAT SERPL-MCNC: 0.63 MG/DL (ref 0.52–1.04)
GFR SERPL CREATININE-BSD FRML MDRD: >90 ML/MIN/{1.73_M2}
HGB BLD-MCNC: 11.7 G/DL (ref 11.7–15.7)
POTASSIUM SERPL-SCNC: 4.3 MMOL/L (ref 3.4–5.3)

## 2020-09-03 RX ORDER — CALCIUM CARBONATE 500 MG/1
1 TABLET, CHEWABLE ORAL 2 TIMES DAILY
COMMUNITY

## 2020-09-03 ASSESSMENT — MIFFLIN-ST. JEOR: SCORE: 966.85

## 2020-09-03 ASSESSMENT — COPD QUESTIONNAIRES: COPD: 0

## 2020-09-03 ASSESSMENT — PAIN SCALES - GENERAL: PAINLEVEL: NO PAIN (0)

## 2020-09-03 NOTE — ANESTHESIA PREPROCEDURE EVALUATION
Anesthesia Pre-Procedure Evaluation    Patient: Juanita Galvan   MRN:     [id number] Gender:   female   Age:    64 year old :      1955        Preoperative Diagnosis: Infection of implant site (H) [T85.79XA]   Procedure(s):  Removal of Nasal Implant, Rigid Nasal Endoscopy with Debridement     LABS:  CBC:   Lab Results   Component Value Date    WBC 10.1 2012    HGB 11.9 2012    HCT 38.5 2012     2012     BMP:   Lab Results   Component Value Date     2014    POTASSIUM 3.9 2014    CHLORIDE 107 2014    CO2 29 2014    BUN 24 2014    CR 0.57 2014    GLC 99 2019     (H) 2018     COAGS: No results found for: PTT, INR, FIBR  POC: No results found for: BGM, HCG, HCGS  OTHER:   Lab Results   Component Value Date    GARIMA 8.7 2014    TSH 2.08 2014        Preop Vitals    BP Readings from Last 3 Encounters:   20 121/75   20 133/60   20 (!) 142/82    Pulse Readings from Last 3 Encounters:   20 67   20 76   20 72      Resp Readings from Last 3 Encounters:   20 16   19 16   19 16    SpO2 Readings from Last 3 Encounters:   20 100%   20 99%   20 95%      Temp Readings from Last 1 Encounters:   20 98.2  F (36.8  C) (Oral)    Ht Readings from Last 1 Encounters:   20 1.524 m (5')      Wt Readings from Last 1 Encounters:   20 48.5 kg (107 lb)    Estimated body mass index is 20.9 kg/m  as calculated from the following:    Height as of this encounter: 1.524 m (5').    Weight as of this encounter: 48.5 kg (107 lb).     LDA:        Past Medical History:   Diagnosis Date     Lipoma      Melasma      Vitreous floaters 3/5/2013      Past Surgical History:   Procedure Laterality Date     COLONOSCOPY  3/26/2012    Procedure:COLONOSCOPY; COLONOSCOPY, SCREEN; Surgeon:MICAH RICHARDSON; Location:[facility] OR     NOSE SURGERY  2011    dorsal splint       Allergies   Allergen Reactions     Nkda [No Known Drug Allergies]      Seasonal Allergies         Anesthesia Evaluation     .             ROS/MED HX    ENT/Pulmonary:  - neg pulmonary ROS    (-) asthma, COPD and recent URI   Neurologic:  - neg neurologic ROS     Cardiovascular:  - neg cardiovascular ROS   (+) ----. : . . . :. . Previous cardiac testing date:results:date: results:ECG reviewed date:6/18/2019 results:NSR date: results:          METS/Exercise Tolerance:  >4 METS   Hematologic:  - neg hematologic  ROS       Musculoskeletal:  - neg musculoskeletal ROS       GI/Hepatic:     (+) GERD Other,       Renal/Genitourinary:  - ROS Renal section negative       Endo:  - neg endo ROS       Psychiatric:  - neg psychiatric ROS       Infectious Disease:  - neg infectious disease ROS      (-) Recent Fever   Malignancy:      - no malignancy   Other:    (+) No chance of pregnancy   - neg other ROS                     PHYSICAL EXAM:   Mental Status/Neuro: A/A/O   Airway: Facies: Feasible  Mallampati: I  Mouth/Opening: Full  TM distance: > 6 cm  Neck ROM: Full   Respiratory: Auscultation: CTAB     Resp. Rate: Normal     Resp. Effort: Normal      CV: Rhythm: Regular  Rate: Age appropriate  Heart: Normal Sounds  Edema: None   Comments: Left lower bridge, right lower bridge planned 9/9/20                     Assessment:   ASA SCORE: 1    H&P: History and physical reviewed and following examination; no interval change.   Smoking Status:  Non-Smoker/Unknown   NPO Status: NPO Appropriate     Plan:   Anes. Type:  General   Pre-Medication: None   Induction:  IV (Standard)   Airway: LMA   Access/Monitoring: PIV   Maintenance: Balanced     Postop Plan:   Postop Pain: Opioids  Postop Sedation/Airway: Not planned  Disposition: Outpatient     PONV Management:   Adult Risk Factors: Female, Non-Smoker, Postop Opioids   Prevention: Ondansetron, Dexamethasone     CONSENT: Direct conversation   Plan and risks discussed with: Patient   Blood  Products: Consent Deferred (Minimal Blood Loss)                PAC Discussion and Assessment    ASA Classification: 1  Case is suitable for: ASC  Anesthetic techniques and relevant risks discussed: GA  Invasive monitoring and risk discussed:   Types:   Possibility and Risk of blood transfusion discussed:   NPO instructions given:   Additional anesthetic preparation and risks discussed:   Needs early admission to pre-op area:   Other:     PAC Resident/NP Anesthesia Assessment:  Juanita Galvan is a 69 year old female scheduled for a Removal of Nasal Implant, Rigid Nasal Endoscopy with Debridement on 9/10/20 by Dr. Perry in treatment of an infection of implant site.  PAC referral for risk assessment and optimization for anesthesia with comorbid conditions of: GERD.    Pre-operative considerations:  1.  Cardiac:  Functional status- METS >4.  She walks 1-2 miles regularly for exercise.  She has no known cardiac conditions.  Low risk surgery with 0.4% risk of major adverse cardiac event.   2.  Pulm:  Airway feasible.  KEN risk: low.  Never smoker.  3.  GI:  Risk of PONV score = 3.  If > 2, anti-emetic intervention recommended.  She takes tums rarely for GERD symptoms.      VTE risk: 0.5%    Patient is optimized and is acceptable candidate for the proposed procedure.  No further diagnostic evaluation is needed.     **For further details of assessment, testing, and physical exam please see H and P completed on same date.          Lesli Pineda DNP, RN, APRN      Reviewed and Signed by PAC Mid-Level Provider/Resident  Mid-Level Provider/Resident: Lesli pineda DNP, Rn, APRN  Date: 9/7/20  Time: 1455    Attending Anesthesiologist Anesthesia Assessment:        Anesthesiologist:   Date:   Time:   Pass/Fail:   Disposition:     PAC Pharmacist Assessment:        Pharmacist:   Date:   Time:    SHARDA Rapp CNP, MD  Staff Anesthesiologist  [phone]

## 2020-09-03 NOTE — PATIENT INSTRUCTIONS
Preparing for Your Surgery      Name:  Juanita Galvan   MRN:  [id number]   :  1955   Today's Date:  9/3/2020       Arriving for surgery:  Surgery date:  9/10/20  Arrival time:  10:30 am    Restrictions due to COVID 19:  Patients are allowed one visitor in the pre-op period  All visitors must wear a mask  No visitors under 18  No ill visitors   parking is not available     Please come to:   [facility]  [address] [place] [id number]     Parking is available in front of the [facility] building from 5:30AM to 8:00PM.  -  Proceed to the 5th floor to check into the Ambulatory Surgery Center.              >> There will be patient concierges on the 1st and 5th floor, for assistance or an escort, if you would like.              >> Please call [phone] with any questions.    What can I eat or drink?  -  You may eat and drink normally for up to 8 hours before your surgery.   -  You may have clear liquids until 4 hours before surgery.   Examples of clear liquids:  Water  Clear broth  Juices (apple, white grape, white cranberry  and cider) without pulp  Noncarbonated, powder based beverages  (lemonade and Christian-Aid)  Sodas (Sprite, 7-Up, ginger ale and seltzer)  Coffee or tea (without milk or cream)  Gatorade    -  No Alcohol for at least 24 hours before surgery     Which medicines can I take?    Hold Aspirin for 7 days before surgery.   Hold Multivitamins for 7 days before surgery.  Hold Supplements (fish oil) for 7 days before surgery.  Hold Ibuprofen (Advil, Motrin) for 1 day before surgery--unless otherwise directed by surgeon.  Hold Naproxen (Aleve) for 4 days before surgery.  -  PLEASE TAKE these medications the day of surgery:  Tylenol if needed; take all scheduled morning medications.    How do I prepare myself?  - Please take 2 showers before surgery using Scrubcare or Hibiclens soap.    Use this soap only from the neck to your toes.     Leave  the soap on your skin for one minute--then rinse thoroughly.      You may use your own shampoo and conditioner; no other hair products.   - Please remove all jewelry and body piercings.  - No lotions, deodorants or fragrance.  - No makeup or fingernail polish.   - Bring your ID and insurance card.    - All patients are required to have a Covid-19 test within 4 days of surgery/procedure.      -Patients will be contacted by the [facility] scheduling team within 1 week of surgery to make an appointment.      - Patients may call the Scheduling team at [phone] if they have not been scheduled within 4 days of  surgery.      ALL PATIENTS GOING HOME THE SAME DAY OF SURGERY ARE REQUIRED TO HAVE A RESPONSIBLE ADULT TO DRIVE AND BE IN ATTENDANCE WITH THEM FOR 24 HOURS FOLLOWING SURGERY     Questions or Concerns:    - For any questions regarding the day of surgery or your hospital stay, please contact the Pre Admission Nursing Office at [phone].       - If you have health changes between today and your surgery please call your surgeon.       For questions after surgery please call your surgeons office.

## 2020-09-07 NOTE — H&P
Pre-Operative H & P     CC:  Preoperative exam to assess for increased cardiopulmonary risk while undergoing surgery and anesthesia.    Date of Encounter: 9/7/2020  Primary Care Physician:  Rajendra, [place]  Associated diagnosis:  Infection of implant site  HPI  Juanita Galvan is a 64 year old female who presents for pre-operative H & P in preparation for Rigid Nasal Endoscopy with Debridement on 9/10/20 by Dr. Perry at [facility].     Juanita Galvan is a 69 year old female with GERD that has an infection of implant site.  She underwent a cosmetic surgery for placement of a nasal dorsal splint in [facility] in 2011.  She started having daily nose bleeds and crusting about a year ago.  She consulted with Dr. Anderson and upon examination she was found to have a small protrusion of her implant in her nose with infectious appearance.  She was subsequently referred to Dr. Perry for the above listed surgery.      History is obtained from the patient, her daughter and the medical record.  He daughter was they  as she signed a waiver to decline a .       Past Medical History  Past Medical History:   Diagnosis Date     Lipoma      Melasma      Vitreous floaters 3/5/2013       Past Surgical History  Past Surgical History:   Procedure Laterality Date     COLONOSCOPY  3/26/2012    Procedure:COLONOSCOPY; COLONOSCOPY, SCREEN; Surgeon:MICAH RICHARDSON; Location:[facility] OR     NOSE SURGERY  2011    dorsal splint       Hx of Blood transfusions/reactions: none    Hx of abnormal bleeding or anti-platelet use: none    Menstrual history: No LMP recorded. Patient is postmenopausal.:     Steroid use in the last year: none    Personal or FH with difficulty with Anesthesia:  none    Prior to Admission Medications  Current Outpatient Medications   Medication Sig Dispense Refill     calcium carbonate (TUMS) 500 MG chewable tablet Take 1 chew tab by mouth 2 times daily Pt  takes as needed       Multiple Vitamins-Minerals (MULTIVITAMIN ADULT PO) Take 1 tablet by mouth every morning       Omega-3 Fatty Acids (OMEGA 3 PO)        Emollient (CETAPHIL MOISTURIZING) CREA Externally apply topically daily 453 g 1     triamcinolone (KENALOG) 0.1 % cream Apply sparingly to affected area three times daily as needed 45 g 1       Allergies  Allergies   Allergen Reactions     Nkda [No Known Drug Allergies]      Seasonal Allergies        Social History  Social History     Socioeconomic History     Marital status:      Spouse name: Not on file     Number of children: 3     Years of education: Not on file     Highest education level: Not on file   Occupational History     Occupation: visual technician   Social Needs     Financial resource strain: Not on file     Food insecurity     Worry: Not on file     Inability: Not on file     Transportation needs     Medical: Not on file     Non-medical: Not on file   Tobacco Use     Smoking status: Never Smoker     Smokeless tobacco: Never Used   Substance and Sexual Activity     Alcohol use: No     Drug use: No     Sexual activity: Not Currently     Partners: Male   Lifestyle     Physical activity     Days per week: Not on file     Minutes per session: Not on file     Stress: Not on file   Relationships     Social connections     Talks on phone: Not on file     Gets together: Not on file     Attends Jehovah's witness service: Not on file     Active member of club or organization: Not on file     Attends meetings of clubs or organizations: Not on file     Relationship status: Not on file     Intimate partner violence     Fear of current or ex partner: Not on file     Emotionally abused: Not on file     Physically abused: Not on file     Forced sexual activity: Not on file   Other Topics Concern     Parent/sibling w/ CABG, MI or angioplasty before 65F 55M? No   Social History Narrative     Not on file       Family History  Family History   Problem Relation Age of  "Onset     No Known Problems Mother      No Known Problems Father      No Known Problems Brother      No Known Problems Sister      No Known Problems Sister      No Known Problems Sister      Cancer No family hx of      Diabetes No family hx of      Hypertension No family hx of      Cerebrovascular Disease No family hx of      Thyroid Disease No family hx of      Glaucoma No family hx of      Macular Degeneration No family hx of      Deep Vein Thrombosis No family hx of      Pulmonary Embolism No family hx of      Anesthesia Reaction No family hx of              ROS/MED HX  The complete review of systems is negative other than noted in the HPI or here.   ENT/Pulmonary:  - neg pulmonary ROS    (-) asthma, COPD and recent URI   Neurologic:  - neg neurologic ROS     Cardiovascular:  - neg cardiovascular ROS   (+) ----. : . . . :. . Previous cardiac testing date:results:date: results:ECG reviewed date:6/18/2019 results:NSR date: results:          METS/Exercise Tolerance:  >4 METS   Hematologic:  - neg hematologic  ROS       Musculoskeletal:  - neg musculoskeletal ROS       GI/Hepatic:     (+) GERD Other,       Renal/Genitourinary:  - ROS Renal section negative       Endo:  - neg endo ROS       Psychiatric:  - neg psychiatric ROS       Infectious Disease:  - neg infectious disease ROS      (-) Recent Fever   Malignancy:      - no malignancy   Other:    (+) No chance of pregnancy   - neg other ROS                     PHYSICAL EXAM:   Mental Status/Neuro: A/A/O   Airway: Facies: Feasible  Mallampati: I  Mouth/Opening: Full  TM distance: > 6 cm  Neck ROM: Full   Respiratory: Auscultation: CTAB     Resp. Rate: Normal     Resp. Effort: Normal      CV: Rhythm: Regular  Rate: Age appropriate  Heart: Normal Sounds  Edema: None   Comments: Left lower bridge, right lower bridge planned 9/9/20                     \                            107 lbs 0 oz  5' 0\"   Body mass index is 20.9 kg/m .       Physical Exam  Constitutional: " Awake, alert, cooperative, no apparent distress, and appears stated age.  Eyes: Pupils equal, round and reactive to light, extra ocular muscles intact, sclera clear, conjunctiva normal.  HENT: Normocephalic, oral pharynx with moist mucus membranes, good dentition. No goiter appreciated.   Respiratory: Clear to auscultation bilaterally, no crackles or wheezing.  Cardiovascular: Regular rate and rhythm, normal S1 and S2, and no murmur noted.  Carotids +2, no bruits. No edema. Palpable pulses to radial  DP and PT arteries.   GI: Normal bowel sounds, soft, non-distended, non-tender, no masses palpated, no hepatosplenomegaly.    Lymph/Hematologic: No cervical lymphadenopathy and no supraclavicular lymphadenopathy.  Genitourinary:  none  Skin: Warm and dry.  No rashes at anticipated surgical site.   Musculoskeletal: Full ROM of neck. There is no redness, warmth, or swelling of the exposed joints. Gross motor strength is normal.    Neurologic: Awake, alert, oriented to name, place and time. Cranial nerves II-XII are grossly intact. Gait is normal.   Neuropsychiatric: Calm, cooperative. Normal affect.     Labs: (personally reviewed)   Component      Latest Ref Rng & Units 9/3/2020   Creatinine      0.52 - 1.04 mg/dL 0.63   GFR Estimate      >60 mL/min/1.73:m2 >90   GFR Estimate If Black      >60 mL/min/1.73:m2 >90   Hemoglobin      11.7 - 15.7 g/dL 11.7   Potassium      3.4 - 5.3 mmol/L 4.3     EK2019  NSR      Outside records reviewed from: Care Everywhere    ASSESSMENT and PLAN  Juanita Galvan is a 69 year old female scheduled for a Removal of Nasal Implant, Rigid Nasal Endoscopy with Debridement on 9/10/20 by Dr. Perry in treatment of an infection of implant site.  PAC referral for risk assessment and optimization for anesthesia with comorbid conditions of: GERD.    Pre-operative considerations:  1.  Cardiac:  Functional status- METS >4.  She walks 1-2 miles regularly for exercise.  She has no known cardiac  conditions.  Low risk surgery with 0.4% risk of major adverse cardiac event.   2.  Pulm:  Airway feasible.  KEN risk: low.  Never smoker.  3.  GI:  Risk of PONV score = 3.  If > 2, anti-emetic intervention recommended.  She takes tums rarely for GERD symptoms.      VTE risk: 0.5%    Patient is optimized and is acceptable candidate for the proposed procedure.  No further diagnostic evaluation is needed.         Lesli Pineda DNP, RN, APRN  Preoperative Assessment Center  [facility]  Clinic and Surgery Center  Phone: [phone]  Fax: [phone]

## 2020-09-08 DIAGNOSIS — Z11.59 ENCOUNTER FOR SCREENING FOR OTHER VIRAL DISEASES: ICD-10-CM

## 2020-09-08 PROCEDURE — U0003 INFECTIOUS AGENT DETECTION BY NUCLEIC ACID (DNA OR RNA); SEVERE ACUTE RESPIRATORY SYNDROME CORONAVIRUS 2 (SARS-COV-2) (CORONAVIRUS DISEASE [COVID-19]), AMPLIFIED PROBE TECHNIQUE, MAKING USE OF HIGH THROUGHPUT TECHNOLOGIES AS DESCRIBED BY CMS-2020-01-R: HCPCS | Performed by: OTOLARYNGOLOGY

## 2020-09-09 LAB
SARS-COV-2 RNA SPEC QL NAA+PROBE: NOT DETECTED
SPECIMEN SOURCE: NORMAL

## 2020-09-10 ENCOUNTER — TELEPHONE (OUTPATIENT)
Dept: PLASTIC SURGERY | Facility: CLINIC | Age: 65
End: 2020-09-10

## 2020-09-10 ENCOUNTER — ANESTHESIA (OUTPATIENT)
Dept: SURGERY | Facility: AMBULATORY SURGERY CENTER | Age: 65
End: 2020-09-10

## 2020-09-10 ENCOUNTER — HOSPITAL ENCOUNTER (OUTPATIENT)
Facility: AMBULATORY SURGERY CENTER | Age: 65
End: 2020-09-10
Attending: OTOLARYNGOLOGY
Payer: COMMERCIAL

## 2020-09-10 VITALS
HEART RATE: 73 BPM | OXYGEN SATURATION: 100 % | RESPIRATION RATE: 16 BRPM | DIASTOLIC BLOOD PRESSURE: 77 MMHG | TEMPERATURE: 97 F | SYSTOLIC BLOOD PRESSURE: 153 MMHG

## 2020-09-10 DIAGNOSIS — T85.79XA: ICD-10-CM

## 2020-09-10 LAB
GRAM STN SPEC: NORMAL
GRAM STN SPEC: NORMAL
Lab: NORMAL
SPECIMEN SOURCE: NORMAL

## 2020-09-10 RX ORDER — SENNA AND DOCUSATE SODIUM 50; 8.6 MG/1; MG/1
1 TABLET, FILM COATED ORAL AT BEDTIME
Qty: 7 TABLET | Refills: 0 | Status: SHIPPED | OUTPATIENT
Start: 2020-09-10 | End: 2020-09-17

## 2020-09-10 RX ORDER — CLINDAMYCIN HCL 300 MG
300 CAPSULE ORAL 3 TIMES DAILY
Qty: 30 CAPSULE | Refills: 0 | Status: CANCELLED | OUTPATIENT
Start: 2020-09-10 | End: 2020-09-20

## 2020-09-10 RX ORDER — NALOXONE HYDROCHLORIDE 0.4 MG/ML
.1-.4 INJECTION, SOLUTION INTRAMUSCULAR; INTRAVENOUS; SUBCUTANEOUS
Status: DISCONTINUED | OUTPATIENT
Start: 2020-09-10 | End: 2020-09-11 | Stop reason: HOSPADM

## 2020-09-10 RX ORDER — SULFAMETHOXAZOLE/TRIMETHOPRIM 800-160 MG
1 TABLET ORAL 2 TIMES DAILY
Qty: 10 TABLET | Refills: 0 | Status: SHIPPED | OUTPATIENT
Start: 2020-09-10 | End: 2020-09-15

## 2020-09-10 RX ORDER — ONDANSETRON 2 MG/ML
4 INJECTION INTRAMUSCULAR; INTRAVENOUS EVERY 30 MIN PRN
Status: DISCONTINUED | OUTPATIENT
Start: 2020-09-10 | End: 2020-09-11 | Stop reason: HOSPADM

## 2020-09-10 RX ORDER — FENTANYL CITRATE 50 UG/ML
25-50 INJECTION, SOLUTION INTRAMUSCULAR; INTRAVENOUS
Status: DISCONTINUED | OUTPATIENT
Start: 2020-09-10 | End: 2020-09-10 | Stop reason: HOSPADM

## 2020-09-10 RX ORDER — MUPIROCIN 20 MG/G
OINTMENT TOPICAL 2 TIMES DAILY
Qty: 15 G | Refills: 0 | Status: SHIPPED | OUTPATIENT
Start: 2020-09-10 | End: 2020-09-15

## 2020-09-10 RX ORDER — LIDOCAINE 40 MG/G
CREAM TOPICAL
Status: DISCONTINUED | OUTPATIENT
Start: 2020-09-10 | End: 2020-09-10 | Stop reason: HOSPADM

## 2020-09-10 RX ORDER — FENTANYL CITRATE 50 UG/ML
25-50 INJECTION, SOLUTION INTRAMUSCULAR; INTRAVENOUS
Status: DISCONTINUED | OUTPATIENT
Start: 2020-09-10 | End: 2020-09-11 | Stop reason: HOSPADM

## 2020-09-10 RX ORDER — OXYMETAZOLINE HYDROCHLORIDE 0.05 G/100ML
SPRAY NASAL PRN
Status: DISCONTINUED | OUTPATIENT
Start: 2020-09-10 | End: 2020-09-10 | Stop reason: HOSPADM

## 2020-09-10 RX ORDER — PROPOFOL 10 MG/ML
INJECTION, EMULSION INTRAVENOUS CONTINUOUS PRN
Status: DISCONTINUED | OUTPATIENT
Start: 2020-09-10 | End: 2020-09-10

## 2020-09-10 RX ORDER — ONDANSETRON 4 MG/1
4 TABLET, ORALLY DISINTEGRATING ORAL EVERY 30 MIN PRN
Status: DISCONTINUED | OUTPATIENT
Start: 2020-09-10 | End: 2020-09-11 | Stop reason: HOSPADM

## 2020-09-10 RX ORDER — PROPOFOL 10 MG/ML
INJECTION, EMULSION INTRAVENOUS PRN
Status: DISCONTINUED | OUTPATIENT
Start: 2020-09-10 | End: 2020-09-10

## 2020-09-10 RX ORDER — CLINDAMYCIN PHOSPHATE 900 MG/50ML
900 INJECTION, SOLUTION INTRAVENOUS
Status: DISCONTINUED | OUTPATIENT
Start: 2020-09-10 | End: 2020-09-10 | Stop reason: HOSPADM

## 2020-09-10 RX ORDER — SODIUM CHLORIDE, SODIUM LACTATE, POTASSIUM CHLORIDE, CALCIUM CHLORIDE 600; 310; 30; 20 MG/100ML; MG/100ML; MG/100ML; MG/100ML
INJECTION, SOLUTION INTRAVENOUS CONTINUOUS
Status: DISCONTINUED | OUTPATIENT
Start: 2020-09-10 | End: 2020-09-11 | Stop reason: HOSPADM

## 2020-09-10 RX ORDER — FENTANYL CITRATE 50 UG/ML
INJECTION, SOLUTION INTRAMUSCULAR; INTRAVENOUS PRN
Status: DISCONTINUED | OUTPATIENT
Start: 2020-09-10 | End: 2020-09-10

## 2020-09-10 RX ORDER — CLINDAMYCIN PHOSPHATE 900 MG/50ML
900 INJECTION, SOLUTION INTRAVENOUS SEE ADMIN INSTRUCTIONS
Status: DISCONTINUED | OUTPATIENT
Start: 2020-09-10 | End: 2020-09-10 | Stop reason: HOSPADM

## 2020-09-10 RX ORDER — OXYCODONE HYDROCHLORIDE 5 MG/1
5 TABLET ORAL EVERY 4 HOURS PRN
Status: DISCONTINUED | OUTPATIENT
Start: 2020-09-10 | End: 2020-09-11 | Stop reason: HOSPADM

## 2020-09-10 RX ORDER — DEXAMETHASONE SODIUM PHOSPHATE 4 MG/ML
INJECTION, SOLUTION INTRA-ARTICULAR; INTRALESIONAL; INTRAMUSCULAR; INTRAVENOUS; SOFT TISSUE PRN
Status: DISCONTINUED | OUTPATIENT
Start: 2020-09-10 | End: 2020-09-10

## 2020-09-10 RX ORDER — HYDROMORPHONE HYDROCHLORIDE 1 MG/ML
.3-.5 INJECTION, SOLUTION INTRAMUSCULAR; INTRAVENOUS; SUBCUTANEOUS EVERY 10 MIN PRN
Status: DISCONTINUED | OUTPATIENT
Start: 2020-09-10 | End: 2020-09-11 | Stop reason: HOSPADM

## 2020-09-10 RX ORDER — ONDANSETRON 2 MG/ML
INJECTION INTRAMUSCULAR; INTRAVENOUS PRN
Status: DISCONTINUED | OUTPATIENT
Start: 2020-09-10 | End: 2020-09-10

## 2020-09-10 RX ORDER — SODIUM CHLORIDE, SODIUM LACTATE, POTASSIUM CHLORIDE, CALCIUM CHLORIDE 600; 310; 30; 20 MG/100ML; MG/100ML; MG/100ML; MG/100ML
INJECTION, SOLUTION INTRAVENOUS CONTINUOUS
Status: DISCONTINUED | OUTPATIENT
Start: 2020-09-10 | End: 2020-09-10 | Stop reason: HOSPADM

## 2020-09-10 RX ORDER — MEPERIDINE HYDROCHLORIDE 25 MG/ML
12.5 INJECTION INTRAMUSCULAR; INTRAVENOUS; SUBCUTANEOUS
Status: DISCONTINUED | OUTPATIENT
Start: 2020-09-10 | End: 2020-09-11 | Stop reason: HOSPADM

## 2020-09-10 RX ORDER — LIDOCAINE HYDROCHLORIDE 20 MG/ML
INJECTION, SOLUTION INFILTRATION; PERINEURAL PRN
Status: DISCONTINUED | OUTPATIENT
Start: 2020-09-10 | End: 2020-09-10

## 2020-09-10 RX ORDER — MUPIROCIN CALCIUM 20 MG/G
CREAM TOPICAL PRN
Status: DISCONTINUED | OUTPATIENT
Start: 2020-09-10 | End: 2020-09-10 | Stop reason: HOSPADM

## 2020-09-10 RX ORDER — ECHINACEA PURPUREA EXTRACT 125 MG
TABLET ORAL
Qty: 44 ML | Refills: 1 | Status: SHIPPED | OUTPATIENT
Start: 2020-09-10 | End: 2021-03-10

## 2020-09-10 RX ORDER — LIDOCAINE HYDROCHLORIDE AND EPINEPHRINE 10; 10 MG/ML; UG/ML
INJECTION, SOLUTION INFILTRATION; PERINEURAL PRN
Status: DISCONTINUED | OUTPATIENT
Start: 2020-09-10 | End: 2020-09-10 | Stop reason: HOSPADM

## 2020-09-10 RX ADMIN — DEXAMETHASONE SODIUM PHOSPHATE 10 MG: 4 INJECTION, SOLUTION INTRA-ARTICULAR; INTRALESIONAL; INTRAMUSCULAR; INTRAVENOUS; SOFT TISSUE at 12:56

## 2020-09-10 RX ADMIN — PROPOFOL 150 MCG/KG/MIN: 10 INJECTION, EMULSION INTRAVENOUS at 12:50

## 2020-09-10 RX ADMIN — LIDOCAINE HYDROCHLORIDE 60 MG: 20 INJECTION, SOLUTION INFILTRATION; PERINEURAL at 12:49

## 2020-09-10 RX ADMIN — CLINDAMYCIN PHOSPHATE 600 MG: 900 INJECTION, SOLUTION INTRAVENOUS at 12:57

## 2020-09-10 RX ADMIN — SODIUM CHLORIDE, SODIUM LACTATE, POTASSIUM CHLORIDE, CALCIUM CHLORIDE: 600; 310; 30; 20 INJECTION, SOLUTION INTRAVENOUS at 10:44

## 2020-09-10 RX ADMIN — FENTANYL CITRATE 50 MCG: 50 INJECTION, SOLUTION INTRAMUSCULAR; INTRAVENOUS at 12:49

## 2020-09-10 RX ADMIN — ONDANSETRON 4 MG: 2 INJECTION INTRAMUSCULAR; INTRAVENOUS at 12:58

## 2020-09-10 RX ADMIN — PROPOFOL 30 MG: 10 INJECTION, EMULSION INTRAVENOUS at 13:15

## 2020-09-10 RX ADMIN — PROPOFOL 120 MG: 10 INJECTION, EMULSION INTRAVENOUS at 12:49

## 2020-09-10 NOTE — DISCHARGE INSTRUCTIONS
"[facility]  Home Care Following Anesthesia  For 24 hours after surgery:  1. Get plenty of rest.  A responsible adult must stay with you for at least 24 hours after you leave the surgery center.  2. Do not drive or use heavy equipment.  If you have weakness or tingling, don't drive or use heavy equipment until this feeling goes away.   3. Do not drink alcohol.   4. Avoid strenuous or risky activities.  Ask for help when climbing stairs.  5. You may feel lightheaded.  IF so, sit for a few minutes before standing.  Have someone help you get up.   6. If you have nausea (feel sick to your stomach): Drink only clear liquids such as apple juice, ginger ale, broth or 7-Up.  Rest may also help.  Be sure to drink enough fluids.  Move to a regular diet as you feel able.   7. You may have a slight fever.  Call the doctor if your fever is over 100 F (37.7 C) (taken under the tongue) or lasts longer than 24 hours.  8. You may have a dry mouth, a sore throat, muscle aches or trouble sleeping. These should go away after 24 hours.  9. Do not make important or legal decisions.        Today you received a Marcaine or bupivacaine block to numb the nerves near your surgery site.  This is a block using local anesthetic or \"numbing\" medication injected around the nerves to anesthetize or \"numb\" the area supplied by those nerves.  This block is injected into the muscle layer near your surgical site.  The medication may numb the location where you had surgery for 6-18 hours, but may last up to 24 hours.  If your surgical site is an arm or leg you should be careful with your affected limb, since it is possible to injure your limb without being aware of it due to the numbing.  Until full feeling returns, you should guard against bumping or hitting your limb, and avoid extreme hot or cold temperatures on the skin.  As the block wears off, the feeling will return as a tingling or prickly sensation near your " surgical site.  You will experience more discomfort from your incision as the feeling returns.  You may want to take a pain pill (a narcotic or Tylenol if this was prescribed by your surgeon) when you start to experience mild pain before the pain beccomes more severe.  If your pain medications do not control your pain you should notifiy your surgeon.    Tips for taking pain medications  To get the best pain relief possible, remember these points:    Take pain medications as directed, before pain becomes severe.    Pain medication can upset your stomach: taking it with food may help.    Constipation is a common side effect of pain medication. Drink plenty of  fluids.    Eat foods high in fiber. Take a stool softener if recommended by your doctor or pharmacist.    Do not drink alcohol, drive or operate machinery while taking pain medications.    Ask about other ways to control pain, such as with heat, ice or relaxation.    Tylenol/Acetaminophen Consumption  To help encourage the safe use of acetaminophen, the makers of TYLENOL  have lowered the maximum daily dose for single-ingredient Extra Strength TYLENOL  (acetaminophen) products sold in the [place] from 8 pills per day (4,000 mg) to 6 pills per day (3,000 mg). The dosing interval has also changed from 2 pills every 4-6 hours to 2 pills every 6 hours.    If you feel your pain relief is insufficient, you may take Tylenol/Acetaminophen in addition to your narcotic pain medication.     Be careful not to exceed 3,000 mg of Tylenol/Acetaminophen in a 24 hour period from all sources.    If you are taking extra strength Tylenol/acetaminophen (500 mg), the maximum dose is 6 tablets in 24 hours.    If you are taking regular strength acetaminophen (325 mg), the maximum dose is 9 tablets in 24 hours.    Call a doctor for any of the followin. Signs of infection (fever, growing tenderness at the surgery site, a large amount of drainage or bleeding, severe pain, foul-smelling  drainage, redness, swelling).  2. It has been over 8 to 10 hours since surgery and you are still not able to urinate (pass water).  3. Headache for over 24 hours.  4. Signs of Covid-19 infection (temperature over 100 degrees, shortness of breath, cough, loss of taste/smell, generalized body aches, persistent headache, chills, sore throat, nausea/vomiting/diarrhea)  Your doctor is:  Dr. Yanci Perry, Otolaryngology: [phone]                  Or dial [phone] and ask for the resident on call for:  ENT Otolaryngology  For emergency care, call the:  [facility]:  [phone] (TTY for hearing impaired: [phone])

## 2020-09-10 NOTE — BRIEF OP NOTE
[facility]    Brief Operative Note    Pre-operative diagnosis: Infection of implant site (H) [T85.79XA]  Post-operative diagnosis: Infection of implant site (H) [T85.79XA]    Procedure: Procedure(s):  Removal of Nasal Implant, Rigid Nasal Endoscopy  Surgeon: Surgeon(s) and Role:     * Yanci Perry MD - Primary        OrobelloLorri MD - Resident  Anesthesia: General   Estimated blood loss: None  Drains: None  Specimens:   ID Type Source Tests Collected by Time Destination   1 : Left nasal implant Other (specify in comments) Nose GRAM STAIN, TISSUE CULTURE AEROBIC BACTERIAL Yanci Perry MD 9/10/2020  1:16 PM      Findings:   Dorsal nasal implant protruding into caudal aspect of left nasal cavity with associated inflammatory tissue. Mucosa overlying implant intact.  Complications: None.  Implants: * No implants in log *

## 2020-09-10 NOTE — TELEPHONE ENCOUNTER
[facility] Call Center    Phone Message    May a detailed message be left on voicemail: yes     Reason for Call: Medication Question or concern regarding medication   Prescription Clarification  Name of Medication: mupirocin (BACTROBAN) 2 % external ointment AND Clindamycin 1% gel  Prescribing Provider: Dr Amira Coles   Pharmacy:  [place], [place] - [address]     What on the order needs clarification? Pt had procedure done today 9/10. Daughter called in asking for clarification on which cream/ointment to use (named above). Please call back to discuss at [phone], thank you    Action Taken: Message routed to:  [facility] ([facility]): ENT    Travel Screening: Not Applicable

## 2020-09-10 NOTE — ANESTHESIA POSTPROCEDURE EVALUATION
Anesthesia POST Procedure Evaluation    Patient: Juanita Galvan   MRN:     [id number] Gender:   female   Age:    64 year old :      1955        Preoperative Diagnosis: Infection of implant site (H) [T85.79XA]   Procedure(s):  Removal of Nasal Implant, Rigid Nasal Endoscopy   Postop Comments: No value filed.     Anesthesia Type: General       Disposition: Outpatient   Postop Pain Control: Uneventful            Sign Out: Well controlled pain   PONV: No   Neuro/Psych: Uneventful            Sign Out: Acceptable/Baseline neuro status   Airway/Respiratory: Uneventful            Sign Out: Acceptable/Baseline resp. status   CV/Hemodynamics: Uneventful            Sign Out: Acceptable CV status   Other NRE: NONE   DID A NON-ROUTINE EVENT OCCUR? No         Last Anesthesia Record Vitals:  CRNA VITALS  9/10/2020 1306 - 9/10/2020 1406      9/10/2020             Resp Rate (observed):  (!) 1    Resp Rate (set):  10          Last PACU Vitals:  Vitals Value Taken Time   /78 9/10/2020  2:00 PM   Temp 36.1  C (97  F) 9/10/2020  2:00 PM   Pulse 68 9/10/2020  2:04 PM   Resp 20 9/10/2020  2:04 PM   SpO2 100 % 9/10/2020  2:04 PM   Temp src     NIBP     Pulse     SpO2     Resp     Temp     Ht Rate     Temp 2     Vitals shown include unvalidated device data.      Electronically Signed By: Maximus Dennison MD, September 10, 2020, 3:05 PM

## 2020-09-10 NOTE — OP NOTE
SURGEON:  Yanci Perry MD   ASSISTANT SURGEON: Azeem Redd MD      TITLE OF OPERATION:  Diagnostic nasal endoscopy, removal of nasal implant                          INDICATIONS:  Juanita Johns had a dorsal nasal implant placed over 10 years ago and now presented with granulation and extrusion. She had no notable cellulitis or drainage and thus the decision was made to examine her under anesthesia and excise the portion of the protruded implant in an attempt to salvage it a no overt infection was present.     PREOPERATIVE DIAGNOSES:   Extruded nasal implant        POSTOPERATIVE DIAGNOSES:     Extruded nasal implant      ANESTHESIA:    GETA      IMPLANT DEVICES:   none      SPECIMENS:  Implant for culture       COMPLICATIONS:  None.       BLOOD LOSS: 1 ml            SURGEON'S NARRATIVE:       FINDINGS:  The patient had a significant fullness and protrusion in the left nostril. This had mucosalized over since I last saw her. The full implant was not extruded what was notable was a shaving like strip of foreign material that was followed and part of the deeper placed implant. The large implant was well covered in tissue. The strip was removed and the granulation tissue excised.         DESCRIPTION OF PROCEDURE:      The patient was brought back to the operating room by the Anesthesiology staff. They were laid supine on the operating room table and induced into general anesthesia with the endotracheal tube secured at the midline of the chin.  The head of the bed was then turned 90 degrees towards the surgical team.  Arms were tucked at the side with all pressure points appropriately padded.  A time-out procedure was performed with all members of the operating room staff in agreement of the site of surgery, the patient identification and surgery to be performed.     1% lidocaine with 1:100,000 epinephrine was injected, a total of 3 mls. A 0 degree endoscope was used to evaluate the sinonasal cavity and no other area of  implant protrusion was seen. Both sinonasal passages were examined.     An incision was made into the area of prominent tissue and elevation was performed with the scissors. The foreign body was encountered as above and followed. This was then excised deep in the mid dorsum. It was sent for culture. The area was irrigated with betadine and then saline. The scar tissue was excised with granulation tissue and the incision was then closed as a marginal incision with 4-0 chromic sutures. Bactroban ointment was placed.       The patient tolerated the procedure well.  There were no complications. The patient was extubated and taken to recovery following commands and breathing spontaneously.       I was present and scrubbed for the entire procedure.           EVELYN VALENZUELA MD

## 2020-09-13 LAB
BACTERIA SPEC CULT: ABNORMAL
Lab: ABNORMAL
SPECIMEN SOURCE: ABNORMAL

## 2020-09-14 NOTE — TELEPHONE ENCOUNTER
Told pt's daughter to continue with the nasal Bactroban ointment that Dr. Perry prescribed on the day of surgery.  Pt no longer needs to use the Clindamycin 1% gel prescribed by Dr. Anderson.    Pt. has a nurse visit with me on Wednesday, 9/16.  We will re assess the need for ointment at that time.    Sondra Hermosillo RN  9/14/2020 12:34 PM

## 2020-09-15 DIAGNOSIS — T85.79XA INFECTION ASSOCIATED WITH IMPLANT (H): Primary | ICD-10-CM

## 2020-09-15 RX ORDER — CIPROFLOXACIN 500 MG/1
500 TABLET, FILM COATED ORAL 2 TIMES DAILY
Qty: 28 TABLET | Refills: 0 | Status: SHIPPED | OUTPATIENT
Start: 2020-09-15 | End: 2020-09-29

## 2020-09-15 NOTE — PROGRESS NOTES
Called pt's daughter to inform her that the nasal implant removed from her mom during surgery came back positive for moderate growth pseudomonas.    Pt. Started on Ciprofloxacin x 14 days.    Will see pt and daughter in clinic tomorrow for post op f/u.    Sondra Hermosillo RN  9/15/2020 11:03 AM

## 2020-09-16 ENCOUNTER — ALLIED HEALTH/NURSE VISIT (OUTPATIENT)
Dept: OTOLARYNGOLOGY | Facility: CLINIC | Age: 65
End: 2020-09-16
Payer: COMMERCIAL

## 2020-09-16 DIAGNOSIS — Z98.890 POSTOPERATIVE STATE: Primary | ICD-10-CM

## 2020-09-17 NOTE — PROGRESS NOTES
Pt. Comes in POD # 6 s/p Removal of Nasal Implant.    Pt. States that she is feeling good and has had no drainage from either nostril.    Pt. Started on Cipro PO x 14 days due to culture of nasal implant tissue being + for pseudomonas.    Dr. Perry told pt to continue to nasal Bactroban until she has completed the PO course of antibiotics.    Pt. To f/u prn.    Sondra Hermosillo RN  9/17/2020 4:14 PM

## 2020-09-21 NOTE — PROGRESS NOTES
Facial Plastic and Reconstructive Surgery Consultation    Referring Provider:   Peter Anderson MD  [address] [facility] [address] [facility] [place] [address]    HPI:   I had the pleasure of seeing Juanita Galvan today in clinic for consultation for extruding nasal implant.      Juanita Galvan is a 64 year old female who had a rhinoplasty in [place] to augment her dorsum 10 years ago. She has not had any trouble but recently developed some ulceration and tenderness in her left nostril.     She was assessed by Dr. Anderson who noted granulation tissue and concern for an extruding nasal implant. He placed her on topical nasal antibiotic.    She denies any tenderness over her nose, and has not pain. She also feels that the inside of her nose has improved with the ointment.         Review Of Systems  ROS: 10 point ROS neg other than the symptoms noted above in the HPI.    Patient Active Problem List   Diagnosis     CARDIOVASCULAR SCREENING; LDL GOAL LESS THAN 160     Vitreous floaters     Advanced directives, counseling/discussion     Right foot pain     Screening for cervical cancer     Infection associated with implant (H)     History of rhinoplasty     Infection of implant site (H)     Past Surgical History:   Procedure Laterality Date     COLONOSCOPY  3/26/2012    Procedure:COLONOSCOPY; COLONOSCOPY, SCREEN; Surgeon:MICAH RICHARDSON; Location:[facility] OR     NASAL ENDOSCOPY N/A 9/10/2020    Procedure: Removal of Nasal Implant, Rigid Nasal Endoscopy;  Surgeon: Yanci Perry MD;  Location:  OR     NOSE SURGERY  2011    dorsal splint     Current Outpatient Medications   Medication Sig Dispense Refill     Emollient (CETAPHIL MOISTURIZING) CREA Externally apply topically daily 453 g 1     Omega-3 Fatty Acids (OMEGA 3 PO)        triamcinolone (KENALOG) 0.1 % cream Apply sparingly to affected area three times daily as needed 45 g 1     calcium carbonate (TUMS) 500 MG chewable tablet Take 1 chew tab by mouth 2 times daily Pt takes as needed        ciprofloxacin (CIPRO) 500 MG tablet Take 1 tablet (500 mg) by mouth 2 times daily for 14 days 28 tablet 0     Multiple Vitamins-Minerals (MULTIVITAMIN ADULT PO) Take 1 tablet by mouth every morning       sodium chloride (OCEAN) 0.65 % nasal spray Spray both sides of nose every 2 hours as able 44 mL 1     Nkda [no known drug allergies] and Seasonal allergies  Social History     Socioeconomic History     Marital status:      Spouse name: Not on file     Number of children: 3     Years of education: Not on file     Highest education level: Not on file   Occupational History     Occupation: visual technician   Social Needs     Financial resource strain: Not on file     Food insecurity     Worry: Not on file     Inability: Not on file     Transportation needs     Medical: Not on file     Non-medical: Not on file   Tobacco Use     Smoking status: Never Smoker     Smokeless tobacco: Never Used   Substance and Sexual Activity     Alcohol use: No     Drug use: No     Sexual activity: Not Currently     Partners: Male   Lifestyle     Physical activity     Days per week: Not on file     Minutes per session: Not on file     Stress: Not on file   Relationships     Social connections     Talks on phone: Not on file     Gets together: Not on file     Attends Worship service: Not on file     Active member of club or organization: Not on file     Attends meetings of clubs or organizations: Not on file     Relationship status: Not on file     Intimate partner violence     Fear of current or ex partner: Not on file     Emotionally abused: Not on file     Physically abused: Not on file     Forced sexual activity: Not on file   Other Topics Concern     Parent/sibling w/ CABG, MI or angioplasty before 65F 55M? No   Social History Narrative     Not on file     Family History   Problem Relation Age of Onset     No Known Problems Mother      No Known Problems Father      No Known Problems Brother      No Known Problems Sister       No Known Problems Sister      No Known Problems Sister      Cancer No family hx of      Diabetes No family hx of      Hypertension No family hx of      Cerebrovascular Disease No family hx of      Thyroid Disease No family hx of      Glaucoma No family hx of      Macular Degeneration No family hx of      Deep Vein Thrombosis No family hx of      Pulmonary Embolism No family hx of      Anesthesia Reaction No family hx of        PE:  Alert and Oriented, Answering Questions Appropriately  Atraumatic, Normocephalic, Face Symmetric  Skin: Starks 5  Facial Nerve Intact and facial movement symmetric  EOM's, PEERL  Nasal Exam: No external Deformity, notable post rhinoplasty scar in the soft tissue envelope, no erythema or drainage, no pain on palpation. Anterior rhinoscopy shows that high on the left nasal vestibule, at the anterior septal angle to margin transition she has a prominence of granulation tissue, no foreign body noted., no mucopurulence or polyps, no masses  Chin: Normal   Lips/Teeth/Toungue/Gums: Lips intact, Normal Dentition, Occlusion intact, Oral mucosa intact, no lesions/ulcerations/masses, Tongue mobile  Neuro/Psych: CN's 2-12 intact, Moves all extremities, ambulation in intact, positive affect, no notable muscle weakness          PROCEDURE:diagnostic rigid nasal endoscopy   Indication: nasal implant complications  Performed by: Yanci Perry MD      Nasal Endoscopy is performed to provide a more thorough evaluation of the nasal airway than seen on standard nasal speculum exam.  Findings are as follows:   Nasal passages: patent bilaterally, no purulence, no purulence expressed, no areas of additional granulation along the septum, septum intact, bilateral nasal passages clear of any foreign bodies   Middle Meatus: clear, no mucopurulence bilaterally, no polyps   Nasopharynx: clear, no lesions, no crusting or inflammation    She tolerated the procedure well without any complications.            IMPRESSION:    The primary encounter diagnosis was Acute non-recurrent sinusitis of other sinus. A diagnosis of Infection of implant site (H) was also pertinent to this visit.        PLAN:    Juanita Galvan has a likely extruding nasal implant.   Since she is quite asymptomatic over the dorsum and the implant extends over the dorsum, there may be possibility in salvaging some of it.     I will need to take he to the operating room, endonasally open up the ulcerated area and then address the implant as needed.     She understood and also understood the possible outcomes if the implant has to excised.     Photodocumentation was obtained.     I spent a total of 45 minutes face-to-face with Juanita Galvan during today's office visit.  Over 50% of this time was spent counseling the patient and/or coordinating care regarding nasal reconstruction and foreign body extrusion.  See note for details.

## 2021-03-03 ENCOUNTER — ANCILLARY PROCEDURE (OUTPATIENT)
Dept: GENERAL RADIOLOGY | Facility: CLINIC | Age: 66
End: 2021-03-03
Attending: PREVENTIVE MEDICINE
Payer: COMMERCIAL

## 2021-03-03 ENCOUNTER — OFFICE VISIT (OUTPATIENT)
Dept: FAMILY MEDICINE | Facility: CLINIC | Age: 66
End: 2021-03-03
Payer: COMMERCIAL

## 2021-03-03 VITALS
HEART RATE: 72 BPM | TEMPERATURE: 98.1 F | SYSTOLIC BLOOD PRESSURE: 132 MMHG | BODY MASS INDEX: 20.98 KG/M2 | DIASTOLIC BLOOD PRESSURE: 78 MMHG | WEIGHT: 107.4 LBS | OXYGEN SATURATION: 100 %

## 2021-03-03 DIAGNOSIS — M25.562 ACUTE PAIN OF LEFT KNEE: Primary | ICD-10-CM

## 2021-03-03 DIAGNOSIS — Z12.11 SCREEN FOR COLON CANCER: ICD-10-CM

## 2021-03-03 DIAGNOSIS — M25.562 ACUTE PAIN OF LEFT KNEE: ICD-10-CM

## 2021-03-03 PROCEDURE — 99213 OFFICE O/P EST LOW 20 MIN: CPT | Performed by: PREVENTIVE MEDICINE

## 2021-03-03 PROCEDURE — 73562 X-RAY EXAM OF KNEE 3: CPT | Mod: LT | Performed by: RADIOLOGY

## 2021-03-03 RX ORDER — PREDNISONE 20 MG/1
20 TABLET ORAL 2 TIMES DAILY
Qty: 10 TABLET | Refills: 0 | Status: SHIPPED | OUTPATIENT
Start: 2021-03-03 | End: 2021-03-08

## 2021-03-03 ASSESSMENT — PAIN SCALES - GENERAL: PAINLEVEL: SEVERE PAIN (6)

## 2021-03-03 ASSESSMENT — PATIENT HEALTH QUESTIONNAIRE - PHQ9: SUM OF ALL RESPONSES TO PHQ QUESTIONS 1-9: 3

## 2021-03-03 NOTE — LETTER
March 5, 2021      Juanita Galvan  [address]  [place] [address]        Dear Juanita Galvan,     X rays of the knee showed mild arthritis. No fractures or fluid collection seen.   Plan of care and follow up as discussed in clinic.   Please let me know if you have any questions and thank you for choosing [facility].     Regards,     Milagro Acosta MD MPH    Resulted Orders   XR Knee Left 3 Views    Narrative    LEFT KNEE THREE VIEWS  3/3/2021 9:17 AM     HISTORY: Concern for popliteal cyst. Acute pain of left knee.    COMPARISON: 11/28/2014 x-ray.      Impression    IMPRESSION: Mild medial compartment joint space narrowing. No evidence  of fracture or definite joint effusion. Very mild progression of  medial compartment degenerative changes.    ETTA GRANT MD

## 2021-03-03 NOTE — PROGRESS NOTES
Assessment & Plan     Acute pain of left knee  -rest, ice  -await results of X rays  -differentials include popliteal cyst, bursitis, osteoarthritis  -No signs to suggest DVT  -if not better in 5-7 days then needs referral to Sports Medicine   - XR Knee Left 3 Views  - predniSONE (DELTASONE) 20 MG tablet  Dispense: 10 tablet; Refill: 0    Screen for colon cancer  -history of polyps  -last colonoscopy was in 2012, repeat Q5 years   - GASTROENTEROLOGY ADULT REF PROCEDURE ONLY      20 minutes spent on the date of the encounter doing chart review, history and exam, documentation and further activities as noted above         Return in about 5 days (around 3/8/2021), or if symptoms worsen or fail to improve.    Milagro Acosta MD MPH    [facility]    Subjective   Juanita Johns is a 65 year old who presents for the following health issues here with spouse     HPI       Musculoskeletal problem/pain-knee pain/swelling.   Onset/Duration: 2 weeks.   Description  Location: knee - left  Joint Swelling: YES  Redness: YES  Pain: YES  Warmth: no  Intensity:  severe  Progression of Symptoms:  worsening and intermittent  Accompanying signs and symptoms:   Fevers: no  Numbness/tingling/weakness: no  History  Trauma to the area: no  Recent illness:  no  Previous similar problem: no  Previous evaluation:  no  Precipitating or alleviating factors:  Aggravating factors include: sitting, standing, walking and climbing stairs  Therapies tried and outcome: rest/inactivity and NSAID - ibuprofen  No past history  No other joints  Feels steady  No rash   Feels fullness and tightness in the back of the knee  No history of DVT  No chest pain  No shortness of breath      Review of Systems   Constitutional, HEENT, cardiovascular, pulmonary, gi and gu systems are negative, except as otherwise noted.      Objective    /78 (BP Location: Left arm, Patient Position: Sitting, Cuff Size: Adult Regular)   Pulse 72   Temp  98.1  F (36.7  C) (Tympanic)   Wt 48.7 kg (107 lb 6.4 oz)   LMP 09/09/2000 (Approximate)   SpO2 100%   BMI 20.98 kg/m    Body mass index is 20.98 kg/m .  Physical Exam   GENERAL APPEARANCE: healthy, alert and no distress  EYES: Eyes grossly normal to inspection and conjunctivae and sclerae normal  RESP: lungs clear to auscultation - no rales, rhonchi or wheezes  CV: regular rates and rhythm, normal S1 S2, no S3 or S4 and no murmur, click or rub  ABDOMEN: non distended   MS: extremities normal- no gross deformities noted and peripheral pulses normal  SKIN: no suspicious lesions or rashes  NEURO: Normal strength and tone, mentation intact and speech normal  PSYCH: mentation appears normal  Left knee and leg: No redness, slight increase in temperature, no quadriceps tenderness, swelling medially, +popliteal fullness and tenderness, decreased flexion and extension. Crepitus+, varus and valgus test negative, Ruddy negative, anterior and posterior drawer negative.

## 2021-03-03 NOTE — PATIENT INSTRUCTIONS
At [facility], we strive to deliver an exceptional experience to you, every time we see you. If you receive a survey, please complete it as we do value your feedback.  If you have MyChart, you can expect to receive results automatically within 24 hours of their completion.  Your provider will send a note interpreting your results as well.   If you do not have MyChart, you should receive your results in about a week by mail.    Your care team:                            Family Medicine Internal Medicine   MD Kumar Moss MD Shantel Branch-Fleming, MD Srinivasa Vaka, MD Katya Belousova, PAYULIA Stubbs, APRN CNP    Enrrique Lin, MD Pediatrics   Arya Justice, PAYULIA Wild, CNP MD Mariella Champion APRN CNP   MD Corinne Ariza MD Deborah Mielke, MD Deepthi Salinas, APRN [facility]      Clinic hours: Monday - Thursday 7 am-6 pm; Fridays 7 am-5 pm.   Urgent care: Monday - Friday 11 am-9 pm; Saturday and Sunday 9 am-5 pm.    Clinic: [phone]       [facility]: Monday - Thursday 8 am - 7 pm; Friday 8 am - 6 pm  [facility]: [phone]     Use [email or web address] for 24/7 diagnosis and treatment of dozens of conditions.

## 2021-03-05 NOTE — RESULT ENCOUNTER NOTE
Please send a letter:    Dear Juanita Galvan,    X rays of the knee showed mild arthritis. No fractures or fluid collection seen.   Plan of care and follow up as discussed in clinic.  Please let me know if you have any questions and thank you for choosing [facility].    Regards,    Milagro Acosta MD MPH

## 2021-03-10 ENCOUNTER — OFFICE VISIT (OUTPATIENT)
Dept: FAMILY MEDICINE | Facility: CLINIC | Age: 66
End: 2021-03-10
Payer: COMMERCIAL

## 2021-03-10 VITALS
WEIGHT: 107.25 LBS | SYSTOLIC BLOOD PRESSURE: 123 MMHG | HEART RATE: 78 BPM | TEMPERATURE: 98.2 F | HEIGHT: 60 IN | DIASTOLIC BLOOD PRESSURE: 75 MMHG | OXYGEN SATURATION: 99 % | BODY MASS INDEX: 21.06 KG/M2

## 2021-03-10 DIAGNOSIS — M25.362 INSTABILITY OF LEFT KNEE JOINT: ICD-10-CM

## 2021-03-10 DIAGNOSIS — M25.562 ACUTE PAIN OF LEFT KNEE: Primary | ICD-10-CM

## 2021-03-10 PROCEDURE — 99213 OFFICE O/P EST LOW 20 MIN: CPT | Performed by: PREVENTIVE MEDICINE

## 2021-03-10 ASSESSMENT — MIFFLIN-ST. JEOR: SCORE: 962.98

## 2021-03-10 ASSESSMENT — PAIN SCALES - GENERAL: PAINLEVEL: MODERATE PAIN (4)

## 2021-03-10 NOTE — PROGRESS NOTES
Assessment & Plan     Acute pain of left knee  -some improvement with Prednisone but still with persistent pain   - MR Knee Left w/o Contrast  - Orthopedic & Spine  Referral    Instability of left knee joint  - MR Knee Left w/o Contrast  - Orthopedic & Spine  Referral  -await results of MRI  -referral to Sports medicine provided     20 minutes spent on the date of the encounter doing chart review, history and exam, documentation and further activities as noted above         Return in about 4 weeks (around 4/7/2021), or if symptoms worsen or fail to improve.    Milagro Acosta MD MPH    [facility]    Jose Johns is a 65 year old who presents for the following health issues:    HPI       Left Knee Pain Follow-Up    Onset/Duration: 3-4 weeks.   Description  Location: knee - left  Joint Swelling: YES  Redness: YES  Pain: YES  Warmth: no  Intensity:  improved with the Prednisone   Progression of Symptoms:   intermittent  Accompanying signs and symptoms:   Fevers: no  Numbness/tingling/weakness: no  History  Trauma to the area: no  Recent illness:  no  Previous similar problem: no  Previous evaluation:  no  Precipitating or alleviating factors:  Aggravating factors include: sitting, standing, walking and climbing stairs  Therapies tried and outcome: rest/inactivity and NSAID - ibuprofen  Where in your body do you have pain? Left knee  How has your pain affected your ability to work? Pain does not limit ability to work  Which of these pain treatments have you tried since your last clinic visit? Other: Prednisone  How well are you sleeping? Poor  How has your mood been since your last visit? About the same  Have you had a significant life event? No  Other aggravating factors: none  Taking medication as directed? Yes    PHQ-9 SCORE 3/3/2021   PHQ-9 Total Score 3     No flowsheet data found.  No flowsheet data found.  Encounter-Level CSA:    There are no encounter-level  csa.     Patient-Level CSA:    There are no patient-level csa.         How many servings of fruits and vegetables do you eat daily?  2-3    On average, how many sweetened beverages do you drink each day (Examples: soda, juice, sweet tea, etc.  Do NOT count diet or artificially sweetened beverages)?   0    How many days per week do you exercise enough to make your heart beat faster? 3 or less    How many minutes a day do you exercise enough to make your heart beat faster? 9 or less    How many days per week do you miss taking your medication? 0    Overall better  The swelling in the back of the knee is better  About 50% better  When she walk still feels tight and sometimes feels like the knee will give out   No falls  No tingling or numbness  No other joints  No fever  No past surgeries  Last seen by me 3/3/21. X rays done.     Review of Systems   Constitutional, HEENT, cardiovascular, pulmonary, gi and gu systems are negative, except as otherwise noted.      Objective    /75 (BP Location: Left arm, Patient Position: Sitting, Cuff Size: Adult Regular)   Pulse 78   Temp 98.2  F (36.8  C) (Oral)   Ht 1.524 m (5')   Wt 48.6 kg (107 lb 4 oz)   LMP 09/09/2000 (Approximate)   SpO2 99%   BMI 20.95 kg/m    Body mass index is 20.95 kg/m .  Physical Exam     GENERAL APPEARANCE: healthy, alert and no distress  EYES: Eyes grossly normal to inspection and conjunctivae and sclerae normal  RESP: lungs clear to auscultation - no rales, rhonchi or wheezes  CV: regular rates and rhythm, normal S1 S2, no S3 or S4 and no murmur, click or rub  NEURO: Normal strength and tone, mentation intact and speech normal  PSYCH: mentation appears normal  Left knee and leg: No redness, no quadriceps tenderness, swelling medially, +popliteal fullness and tenderness, decreased flexion and extension. Crepitus+, varus and valgus test negative, Ruddy negative, anterior and posterior drawer negative.       LEFT KNEE THREE VIEWS  3/3/2021  9:17 AM      HISTORY: Concern for popliteal cyst. Acute pain of left knee.     COMPARISON: 11/28/2014 x-ray.                                                                      IMPRESSION: Mild medial compartment joint space narrowing. No evidence  of fracture or definite joint effusion. Very mild progression of  medial compartment degenerative changes.     ETTA GRANT MD

## 2021-03-11 ENCOUNTER — APPOINTMENT (OUTPATIENT)
Dept: INTERPRETER SERVICES | Facility: CLINIC | Age: 66
End: 2021-03-11
Payer: COMMERCIAL

## 2021-03-17 ENCOUNTER — OFFICE VISIT (OUTPATIENT)
Dept: ORTHOPEDICS | Facility: CLINIC | Age: 66
End: 2021-03-17
Attending: PREVENTIVE MEDICINE
Payer: COMMERCIAL

## 2021-03-17 DIAGNOSIS — M25.362 INSTABILITY OF LEFT KNEE JOINT: ICD-10-CM

## 2021-03-17 DIAGNOSIS — M25.562 ACUTE PAIN OF LEFT KNEE: ICD-10-CM

## 2021-03-17 PROCEDURE — 99213 OFFICE O/P EST LOW 20 MIN: CPT | Performed by: FAMILY MEDICINE

## 2021-03-17 NOTE — PROGRESS NOTES
[facility]  SPORTS MEDICINE CLINIC VISIT     Mar 17, 2021        ASSESSMENT & PLAN    65-year-old with popliteal cyst and Knee locking, probable loose body, ultimately secondary to osteoarthritis of the knee    Reviewed imaging and assessment with patient in detail  MRI this Friday per primary  Consider steroid injection based on result    Joes Serrano MD  [facility] SPORTS MEDICINE CLINIC [place]    -----  Chief Complaint   Patient presents with     Consult     left knee pain and posterior swelling, no known injury, pain for a few month        SUBJECTIVE  Juanita Galvan is a/an 65 year old female who is seen in consultation at the request of  Milagro Acosta M.D. for evaluation of  Left knee pain and swelling.     The patient is seen with their .  The patient is Right handed    Onset: 3 week(s) ago. Reports insidious onset without acute precipitating event.  Location of Pain: left knee.  Pain posteriorly.  Worsened by: Has some occasional random locking with walking.  This is alleviated by rubbing the knee.  Also has some pain in the posterior knee with full flexion.  Better with: rest  Treatments tried: ibuprofen and Aleve, prednisone helped.   Associated symptoms: swelling, locking, instability    Orthopedic/Surgical history: NO      REVIEW OF SYSTEMS:    Do you have fever, chills, weight loss? No    Do you have any vision problems? No    Do you have any chest pain or edema? No    Do you have any shortness of breath or wheezing?  No    Do you have stomach problems? No    Do you have any numbness or focal weakness? No    Do you have diabetes? No    Do you have problems with bleeding or clotting? No    Do you have an rashes or other skin lesions? No    OBJECTIVE:  [facility] 09/09/2000 (Approximate)      Patient is alert, No acute distress, pleasant and conversational.    Gait: nonantalgic. Normal heel toe gait.     left knee:   Skin intact. No erythema or ecchymosis.  Posterior knee swelling.   No soft tissue swelling.    AROM: Zero to approximately 135  with some pain in the popliteal area and full flexion    Palpation:   No medial or lateral facet joint tenderness.  No posterior medial or posterior lateral joint line tenderness     Special Tests:  Negative bounce test, positive forced flexion and negative Jennifer's.  No ligamentous laxity or pain with valgus or varus stress.  Negative Lachman's, Anterior Drawer and Posterior Drawer     Full Isometric quad strength, extensor mechanism in place     Neurovascularly intact in the lower extremity    Hip and Ankle with full AROM and nontender      RADIOLOGY:    Independently reviewed previous three-view x-rays of the left knee dated 3/3/2021 demonstrating mild medial compartment DJD with patellar enthesophytes.  No acute findings.  See EMR for formal radiology report.

## 2021-03-17 NOTE — LETTER
3/17/2021         RE: Juanita Galvan  [address]  [place] [address]        Dear Colleague,    Thank you for referring your patient, Juanita Galvan, to the [facility] SPORTS MEDICINE CLINIC [place]. Please see a copy of my visit note below.      [facility]  SPORTS MEDICINE CLINIC VISIT     Mar 17, 2021        ASSESSMENT & PLAN    65-year-old with popliteal cyst and Knee locking, probable loose body, ultimately secondary to osteoarthritis of the knee    Reviewed imaging and assessment with patient in detail  MRI this Friday per primary  Consider steroid injection based on result    Jose Serrano MD  [facility] SPORTS MEDICINE [facility]    -----  Chief Complaint   Patient presents with     Consult     left knee pain and posterior swelling, no known injury, pain for a few month        SUBJECTIVE  Juanita Galvan is a/an 65 year old female who is seen in consultation at the request of  Milagro Acosta M.D. for evaluation of  Left knee pain and swelling.     The patient is seen with their .  The patient is Right handed    Onset: 3 week(s) ago. Reports insidious onset without acute precipitating event.  Location of Pain: left knee.  Pain posteriorly.  Worsened by: Has some occasional random locking with walking.  This is alleviated by rubbing the knee.  Also has some pain in the posterior knee with full flexion.  Better with: rest  Treatments tried: ibuprofen and Aleve, prednisone helped.   Associated symptoms: swelling, locking, instability    Orthopedic/Surgical history: NO      REVIEW OF SYSTEMS:    Do you have fever, chills, weight loss? No    Do you have any vision problems? No    Do you have any chest pain or edema? No    Do you have any shortness of breath or wheezing?  No    Do you have stomach problems? No    Do you have any numbness or focal weakness? No    Do you have diabetes? No    Do you have problems with bleeding or clotting? No    Do you have an rashes  or other skin lesions? No    OBJECTIVE:  [facility] 09/09/2000 (Approximate)      Patient is alert, No acute distress, pleasant and conversational.    Gait: nonantalgic. Normal heel toe gait.     left knee:   Skin intact. No erythema or ecchymosis.  Posterior knee swelling.  No soft tissue swelling.    AROM: Zero to approximately 135  with some pain in the popliteal area and full flexion    Palpation:   No medial or lateral facet joint tenderness.  No posterior medial or posterior lateral joint line tenderness     Special Tests:  Negative bounce test, positive forced flexion and negative Jennifer's.  No ligamentous laxity or pain with valgus or varus stress.  Negative Lachman's, Anterior Drawer and Posterior Drawer     Full Isometric quad strength, extensor mechanism in place     Neurovascularly intact in the lower extremity    Hip and Ankle with full AROM and nontender      RADIOLOGY:    Independently reviewed previous three-view x-rays of the left knee dated 3/3/2021 demonstrating mild medial compartment DJD with patellar enthesophytes.  No acute findings.  See EMR for formal radiology report.              Again, thank you for allowing me to participate in the care of your patient.        Sincerely,        Jose Serrano MD

## 2021-03-19 ENCOUNTER — ANCILLARY PROCEDURE (OUTPATIENT)
Dept: MRI IMAGING | Facility: CLINIC | Age: 66
End: 2021-03-19
Attending: PREVENTIVE MEDICINE
Payer: COMMERCIAL

## 2021-03-19 DIAGNOSIS — M25.562 ACUTE PAIN OF LEFT KNEE: ICD-10-CM

## 2021-03-19 DIAGNOSIS — M25.362 INSTABILITY OF LEFT KNEE JOINT: ICD-10-CM

## 2021-03-19 PROCEDURE — 73721 MRI JNT OF LWR EXTRE W/O DYE: CPT | Mod: LT | Performed by: RADIOLOGY

## 2021-03-19 NOTE — LETTER
March 22, 2021      Juanita Galvan  [address]  [place] [address]        Dear Juanita Galvan,     MRI of the knee is showing abnormalities including a tear of the medial mensicus. Please follow up with the Sports Medicine doctor.   Please let me know if you have any questions and thank you for choosing [facility].     Regards,     Milagro Acosta MD MPH     Resulted Orders   MR Knee Left w/o Contrast    Narrative    MR left knee without contrast 3/19/2021 1:49 PM    Techniques: Multiplanar multisequence imaging of the left knee was  obtained without administration of intra-articular or intravenous  contrast using routing protocol.    History: None    Additional History from EMR: None    Comparison: Radiographs of the left knee 3/3/2021    Findings:    MENISCI:  Medial meniscus: Radial tear or avulsion of the posterior root  ligament. The body of the meniscus is extruded 2 mm  Lateral meniscus: Intact.    LIGAMENTS  Cruciate ligaments: Intact.  Medial supporting structures: Edema superficial to the proximal tibial  collateral ligament, which is confluent with the small popliteal cyst  and possibly confluent with a loculated fluid collection posterior to  the medial distal femoral metaphysis.  Lateral supporting structures: Intact.    EXTENSOR MECHANISM  Intact.    FLUID  Moderate joint effusion. Small Baker's cyst.    OSSEOUS and ARTICULAR STRUCTURES  Bones: No fracture, contusion, or osseous lesion is seen.    Patellofemoral compartment: Hyperintense signal in the articular  cartilage of the medial facet of the trochlea (series 4 image 11)  which likely represents at least a superficial chondral fissure  extending less than 50% of the articular cartilage depth. No  underlying osseous signal abnormality.    Medial compartment: Moderate diffuse chondral thinning.    Lateral compartment: Suspected full-thickness chondral fissure with a  focus of subchondral osseous edema like signal in the central  tibial  plateau (series 7 image 15).    ANCILLARY FINDINGS  None.      Impression    Impression:    1. Radial tear or avulsion of the posterior root ligament of the  medial meniscus.    2. Loculated fluid intensity cystic structure posterior to the medial  femoral metaphysis, which may represent a ganglion cyst or defect in  the posterior superior joint capsule and leakage of joint fluid.    3. 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    I have personally reviewed the examination and initial interpretation  and I agree with the findings.    ANA ROSA SIERRA MD (Joe)

## 2021-03-19 NOTE — RESULT ENCOUNTER NOTE
Please send a letter:    Dear Juanita Galvan,    MRI of the knee is showing abnormalities including a tear of the medial mensicus. Please follow up with the Sports Medicine doctor.  Please let me know if you have any questions and thank you for choosing [facility].    Regards,    Milagro Acosta MD MPH    
negative

## 2021-03-24 ENCOUNTER — OFFICE VISIT (OUTPATIENT)
Dept: ORTHOPEDICS | Facility: CLINIC | Age: 66
End: 2021-03-24
Payer: COMMERCIAL

## 2021-03-24 VITALS — WEIGHT: 107 LBS | BODY MASS INDEX: 21.01 KG/M2 | HEIGHT: 60 IN

## 2021-03-24 DIAGNOSIS — M25.562 ACUTE PAIN OF LEFT KNEE: Primary | ICD-10-CM

## 2021-03-24 PROCEDURE — 99213 OFFICE O/P EST LOW 20 MIN: CPT | Performed by: FAMILY MEDICINE

## 2021-03-24 ASSESSMENT — MIFFLIN-ST. JEOR: SCORE: 961.85

## 2021-03-24 NOTE — PROGRESS NOTES
[facility]  SPORTS MEDICINE CLINIC VISIT     Mar 24, 2021        ASSESSMENT & PLAN    65-year-old with left knee pain that is likely secondary to chondromalacia and meniscal injury.  Improving thus far without intervention.    Reviewed imaging and assessment with patient in detail  Discussed options for treatment including OTC medications, bracing, home exercise, physical therapy, injections.  Patient is interested in pursuing course of physical therapy.  She was provided a referral.  She will contact us if she would like to pursue any of the above interventions.    Jose Serrano MD  [facility] SPORTS MEDICINE [facility]    -----  Chief Complaint   Patient presents with     RECHECK     follow up left knee MRI        SUBJECTIVE  Juanita Galvan is a/an 65 year old female who is seen as a follow up for evaluation of  Left knee after MRI .  Overall her symptoms have been improving.    The patient is seen with their  who helps with interpretation..      REVIEW OF SYSTEMS:    Do you have fever, chills, weight loss? No    Do you have any vision problems? No    Do you have any chest pain or edema? No    Do you have any shortness of breath or wheezing?  No    Do you have stomach problems? No    Do you have any numbness or focal weakness? No    Do you have diabetes? No    Do you have problems with bleeding or clotting? No    Do you have an rashes or other skin lesions? No    OBJECTIVE:  Ht 1.524 m (5')   Wt 48.5 kg (107 lb)   LMP 09/09/2000 (Approximate)   BMI 20.90 kg/m       No exam this visit    RADIOLOGY:    MRI of the left knee dated 3/19/2021.  Per radiology report:  1. Radial tear or avulsion of the posterior root ligament of the  medial meniscus.     2. Loculated fluid intensity cystic structure posterior to the medial  femoral metaphysis, which may represent a ganglion cyst or defect in  the posterior superior joint capsule and leakage of joint fluid.     3. Modified Outerbridge  grade II chondromalacia in the patellofemoral  compartment, moderate diffuse chondral thinning in the medial  femorotibial compartment (grade 2/3 chondromalacia), and a suspected  full-thickness chondral fissure in the lateral compartment (grade IV  chondromalacia).

## 2021-03-24 NOTE — LETTER
3/24/2021         RE: Juanita Galvan  [address]  [place] [address]        Dear Colleague,    Thank you for referring your patient, Juanita Galvan, to the [facility] SPORTS MEDICINE [facility]. Please see a copy of my visit note below.      [facility]  SPORTS MEDICINE CLINIC VISIT     Mar 24, 2021        ASSESSMENT & PLAN    65-year-old with left knee pain that is likely secondary to chondromalacia and meniscal injury.  Improving thus far without intervention.    Reviewed imaging and assessment with patient in detail  Discussed options for treatment including OTC medications, bracing, home exercise, physical therapy, injections.  Patient is interested in pursuing course of physical therapy.  She was provided a referral.  She will contact us if she would like to pursue any of the above interventions.    Jose Serrano MD  [facility]    -----  Chief Complaint   Patient presents with     RECHECK     follow up left knee MRI        SUBJECTIVE  Juanita Galvan is a/an 65 year old female who is seen as a follow up for evaluation of  Left knee after MRI .  Overall her symptoms have been improving.    The patient is seen with their  who helps with interpretation..      REVIEW OF SYSTEMS:    Do you have fever, chills, weight loss? No    Do you have any vision problems? No    Do you have any chest pain or edema? No    Do you have any shortness of breath or wheezing?  No    Do you have stomach problems? No    Do you have any numbness or focal weakness? No    Do you have diabetes? No    Do you have problems with bleeding or clotting? No    Do you have an rashes or other skin lesions? No    OBJECTIVE:  Ht 1.524 m (5')   Wt 48.5 kg (107 lb)   LMP 09/09/2000 (Approximate)   BMI 20.90 kg/m       No exam this visit    RADIOLOGY:    MRI of the left knee dated 3/19/2021.  Per radiology report:  1. Radial tear or avulsion of the posterior root  ligament of the  medial meniscus.     2. Loculated fluid intensity cystic structure posterior to the medial  femoral metaphysis, which may represent a ganglion cyst or defect in  the posterior superior joint capsule and leakage of joint fluid.     3. 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               Again, thank you for allowing me to participate in the care of your patient.        Sincerely,        Jose Serrano MD

## 2021-04-19 ENCOUNTER — THERAPY VISIT (OUTPATIENT)
Dept: PHYSICAL THERAPY | Facility: CLINIC | Age: 66
End: 2021-04-19
Attending: FAMILY MEDICINE
Payer: COMMERCIAL

## 2021-04-19 DIAGNOSIS — M25.562 ACUTE PAIN OF LEFT KNEE: ICD-10-CM

## 2021-04-19 DIAGNOSIS — M25.562 LEFT KNEE PAIN: ICD-10-CM

## 2021-04-19 PROCEDURE — 97161 PT EVAL LOW COMPLEX 20 MIN: CPT | Mod: GP | Performed by: PHYSICAL THERAPIST

## 2021-04-19 PROCEDURE — 97110 THERAPEUTIC EXERCISES: CPT | Mod: GP | Performed by: PHYSICAL THERAPIST

## 2021-04-19 ASSESSMENT — ACTIVITIES OF DAILY LIVING (ADL)
LIMPING: I DO NOT HAVE THE SYMPTOM
KNEE_ACTIVITY_OF_DAILY_LIVING_SCORE: 74.29
SWELLING: I DO NOT HAVE THE SYMPTOM
WALK: ACTIVITY IS MINIMALLY DIFFICULT
KNEE_ACTIVITY_OF_DAILY_LIVING_SUM: 52
AS_A_RESULT_OF_YOUR_KNEE_INJURY,_HOW_WOULD_YOU_RATE_YOUR_CURRENT_LEVEL_OF_DAILY_ACTIVITY?: NEARLY NORMAL
STIFFNESS: I DO NOT HAVE THE SYMPTOM
STAND: ACTIVITY IS NOT DIFFICULT
HOW_WOULD_YOU_RATE_THE_OVERALL_FUNCTION_OF_YOUR_KNEE_DURING_YOUR_USUAL_DAILY_ACTIVITIES?: NEARLY NORMAL
RAW_SCORE: 52
KNEEL ON THE FRONT OF YOUR KNEE: ACTIVITY IS FAIRLY DIFFICULT
SQUAT: ACTIVITY IS FAIRLY DIFFICULT
GIVING WAY, BUCKLING OR SHIFTING OF KNEE: I DO NOT HAVE THE SYMPTOM
SIT WITH YOUR KNEE BENT: ACTIVITY IS NOT DIFFICULT
PAIN: THE SYMPTOM AFFECTS MY ACTIVITY SLIGHTLY
WEAKNESS: THE SYMPTOM AFFECTS MY ACTIVITY SLIGHTLY
GO DOWN STAIRS: ACTIVITY IS FAIRLY DIFFICULT
HOW_WOULD_YOU_RATE_THE_CURRENT_FUNCTION_OF_YOUR_KNEE_DURING_YOUR_USUAL_DAILY_ACTIVITIES_ON_A_SCALE_FROM_0_TO_100_WITH_100_BEING_YOUR_LEVEL_OF_KNEE_FUNCTION_PRIOR_TO_YOUR_INJURY_AND_0_BEING_THE_INABILITY_TO_PERFORM_ANY_OF_YOUR_USUAL_DAILY_ACTIVITIES?: 50
GO UP STAIRS: ACTIVITY IS FAIRLY DIFFICULT
RISE FROM A CHAIR: ACTIVITY IS MINIMALLY DIFFICULT

## 2021-04-19 NOTE — PROGRESS NOTES
Physical Therapy Initial Evaluation  Subjective:  The history is provided by the patient.   Patient Health History  Juanita Galvan being seen for L knee pain.     Problem began: 3/24/2021.   Problem occurred: insidious   Pain is reported as 5/10 on pain scale.  General health as reported by patient is excellent.  Pertinent medical history includes: none.   Red flags:  None as reported by patient.  Medical allergies: none.   Surgeries include:  None.    Current medications:  None.    Current occupation is assembly.   Primary job tasks include:  Prolonged sitting.                  Therapist Generated HPI Evaluation  Problem details: Patient reports that she had insidious onset of left knee pain about 2 months ago. Md order from 3-24-21..         Type of problem:  Left knee.    This is a new condition.  Condition occurred with:  Insidious onset.  Where condition occurred: for unknown reasons.  Patient reports pain:  Anterior, medial and lateral.  Pain is described as aching and is intermittent.  Pain is worse during the day.  Since onset symptoms are gradually improving.  Associated symptoms:  Loss of strength. Symptoms are exacerbated by ascending stairs, descending stairs, bending/squatting, kneeling and walking (stairs one at a time with 5/10 to ascend)  and relieved by other ( massaging her knee).  Special tests included:  MRI (med meniscus tear, chondromalacia).    Restrictions due to condition include:  Working in normal job without restrictions.  Barriers include:  None as reported by patient.                        Objective:  Standing Alignment:              Knee:  Genu valgus R                                                       Hip Evaluation    Hip Strength:      Extension:  Left: 4+/5  -  Pain:  Abduction:  Left: 4/5    -   Pain:                           Knee Evaluation:  ROM:  AROM: normal  PROM: normal (slight ache end range L knee full flexion)            Strength:     Extension:  Left: 4+/5     Pain:++      Right: 5/5    Pain:-  Flexion:  Left: 5/5    Pain:-      Right: 5/5    Pain:-    Quad Set Left: Good    Pain:   Quad Set Right: Good    Pain:  Ligament Testing:  Normal                Special Tests:   Left knee positive for the following special tests:  Patellar Compression    Palpation:    Left knee tenderness present at:  Medial Joint Line and Patellar Medial    Edema:  Normal    Mobility Testing:  Normal            Functional Testing:  not assessed                  General     ROS    Assessment/Plan:    Patient is a 65 year old female with left side knee complaints.    Patient has the following significant findings with corresponding treatment plan.                Diagnosis 1:  L knee pain/meniscus tear/chondromalacia  Pain -  hot/cold therapy, self management, education, directional preference exercise and home program  Decreased strength - therapeutic exercise, therapeutic activities and home program  Decreased proprioception - neuro re-education, therapeutic activities and home program  Impaired muscle performance - neuro re-education and home program  Decreased function - therapeutic activities and home program    Therapy Evaluation Codes:   1) History comprised of:   Personal factors that impact the plan of care:      None.    Comorbidity factors that impact the plan of care are:      None.     Medications impacting care: None.  2) Examination of Body Systems comprised of:   Body structures and functions that impact the plan of care:      Knee.   Activity limitations that impact the plan of care are:      Squatting/kneeling, Stairs, Walking and Working.  3) Clinical presentation characteristics are:   Stable/Uncomplicated.  4) Decision-Making    Low complexity using standardized patient assessment instrument and/or measureable assessment of functional outcome.  Cumulative Therapy Evaluation is: Low complexity.    Previous and current functional limitations:  (See Goal Flow Sheet for this  information)    Short term and Long term goals: (See Goal Flow Sheet for this information)     Communication ability:  Patient appears to be able to clearly communicate and understand verbal and written communication and follow directions correctly.  Patient has an  for communication clarity.  Treatment Explanation - The following has been discussed with the patient:   RX ordered/plan of care  Anticipated outcomes  Possible risks and side effects  This patient would benefit from PT intervention to resume normal activities.   Rehab potential is good.    Frequency:  1 X week, once daily  Duration:  for 8 weeks  Discharge Plan:  Achieve all LTG.  Independent in home treatment program.  Reach maximal therapeutic benefit.    Please refer to the daily flowsheet for treatment today, total treatment time and time spent performing 1:1 timed codes.

## 2021-04-21 PROBLEM — M25.562 LEFT KNEE PAIN: Status: ACTIVE | Noted: 2021-04-21

## 2021-04-23 ENCOUNTER — OFFICE VISIT (OUTPATIENT)
Dept: FAMILY MEDICINE | Facility: CLINIC | Age: 66
End: 2021-04-23
Payer: COMMERCIAL

## 2021-04-23 VITALS
BODY MASS INDEX: 21.48 KG/M2 | TEMPERATURE: 98.6 F | SYSTOLIC BLOOD PRESSURE: 122 MMHG | RESPIRATION RATE: 16 BRPM | DIASTOLIC BLOOD PRESSURE: 60 MMHG | WEIGHT: 110 LBS | OXYGEN SATURATION: 97 % | HEART RATE: 70 BPM

## 2021-04-23 DIAGNOSIS — Z00.00 ROUTINE GENERAL MEDICAL EXAMINATION AT A HEALTH CARE FACILITY: Primary | ICD-10-CM

## 2021-04-23 DIAGNOSIS — Z12.11 SCREEN FOR COLON CANCER: ICD-10-CM

## 2021-04-23 DIAGNOSIS — Z23 ENCOUNTER FOR IMMUNIZATION: ICD-10-CM

## 2021-04-23 DIAGNOSIS — D12.6 ADENOMATOUS POLYP OF COLON, UNSPECIFIED PART OF COLON: ICD-10-CM

## 2021-04-23 LAB
CHOLEST SERPL-MCNC: 267 MG/DL
GLUCOSE SERPL-MCNC: 98 MG/DL (ref 70–99)
HDLC SERPL-MCNC: 54 MG/DL
LDLC SERPL CALC-MCNC: 179 MG/DL
NONHDLC SERPL-MCNC: 213 MG/DL
TRIGL SERPL-MCNC: 170 MG/DL

## 2021-04-23 PROCEDURE — 80061 LIPID PANEL: CPT | Performed by: PREVENTIVE MEDICINE

## 2021-04-23 PROCEDURE — 99396 PREV VISIT EST AGE 40-64: CPT | Mod: 25 | Performed by: PREVENTIVE MEDICINE

## 2021-04-23 PROCEDURE — 36415 COLL VENOUS BLD VENIPUNCTURE: CPT | Performed by: PREVENTIVE MEDICINE

## 2021-04-23 PROCEDURE — 90750 HZV VACC RECOMBINANT IM: CPT | Performed by: PREVENTIVE MEDICINE

## 2021-04-23 PROCEDURE — 82947 ASSAY GLUCOSE BLOOD QUANT: CPT | Performed by: PREVENTIVE MEDICINE

## 2021-04-23 PROCEDURE — 90471 IMMUNIZATION ADMIN: CPT | Performed by: PREVENTIVE MEDICINE

## 2021-04-23 ASSESSMENT — PAIN SCALES - GENERAL: PAINLEVEL: NO PAIN (0)

## 2021-04-23 NOTE — PROGRESS NOTES
SUBJECTIVE:   CC: Juanita Galvan is an 65 year old woman who presents for preventive health visit.       Patient has been advised of split billing requirements and indicates understanding: Yes  Healthy Habits:    Do you get at least three servings of calcium containing foods daily (dairy, green leafy vegetables, etc.)? yes    Amount of exercise or daily activities, outside of work: sometimes     Problems taking medications regularly not applicable    Medication side effects: No    Have you had an eye exam in the past two years? yes    Do you see a dentist twice per year? yes    Do you have sleep apnea, excessive snoring or daytime drowsiness?no    Thinking about the Covid vaccine, has not decided as yet.       Today's PHQ-2 Score:   PHQ-2 ( 1999 Pfizer) 4/23/2021 3/3/2021   Q1: Little interest or pleasure in doing things 0 2   Q2: Feeling down, depressed or hopeless 0 0   PHQ-2 Score 0 2       Abuse: Current or Past(Physical, Sexual or Emotional)- No  Do you feel safe in your environment? Yes    Have you ever done Advance Care Planning? (For example, a Health Directive, POLST, or a discussion with a medical provider or your loved ones about your wishes): No, advance care planning information given to patient to review.  Patient declined advance care planning discussion at this time.    Social History     Tobacco Use     Smoking status: Never Smoker     Smokeless tobacco: Never Used   Substance Use Topics     Alcohol use: No     If you drink alcohol do you typically have >3 drinks per day or >7 drinks per week? No                     Reviewed orders with patient.  Reviewed health maintenance and updated orders accordingly - Yes  Lab work is in process  Labs reviewed in EPIC  BP Readings from Last 3 Encounters:   04/23/21 122/60   03/10/21 123/75   03/03/21 132/78    Wt Readings from Last 3 Encounters:   04/23/21 49.9 kg (110 lb)   03/24/21 48.5 kg (107 lb)   03/10/21 48.6 kg (107 lb 4 oz)                  Patient  Active Problem List   Diagnosis     CARDIOVASCULAR SCREENING; LDL GOAL LESS THAN 160     Vitreous floaters     Advanced directives, counseling/discussion     Right foot pain     Screening for cervical cancer     Infection associated with implant (H)     History of rhinoplasty     Infection of implant site (H)     Left knee pain     Past Surgical History:   Procedure Laterality Date     COLONOSCOPY  3/26/2012    Procedure:COLONOSCOPY; COLONOSCOPY, SCREEN; Surgeon:MICAH RICHARDSON; Location:[facility] OR     NASAL ENDOSCOPY N/A 9/10/2020    Procedure: Removal of Nasal Implant, Rigid Nasal Endoscopy;  Surgeon: Yanci Perry MD;  Location: [facility] OR     NOSE SURGERY  2011    dorsal splint       Social History     Tobacco Use     Smoking status: Never Smoker     Smokeless tobacco: Never Used   Substance Use Topics     Alcohol use: No     Family History   Problem Relation Age of Onset     No Known Problems Mother      No Known Problems Father      No Known Problems Brother      No Known Problems Sister      No Known Problems Sister      No Known Problems Sister      Cancer No family hx of      Diabetes No family hx of      Hypertension No family hx of      Cerebrovascular Disease No family hx of      Thyroid Disease No family hx of      Glaucoma No family hx of      Macular Degeneration No family hx of      Deep Vein Thrombosis No family hx of      Pulmonary Embolism No family hx of      Anesthesia Reaction No family hx of          Current Outpatient Medications   Medication Sig Dispense Refill     calcium carbonate (TUMS) 500 MG chewable tablet Take 1 chew tab by mouth 2 times daily Pt takes as needed       Emollient (CETAPHIL MOISTURIZING) CREA Externally apply topically daily 453 g 1     Multiple Vitamins-Minerals (MULTIVITAMIN ADULT PO) Take 1 tablet by mouth every morning       Omega-3 Fatty Acids (OMEGA 3 PO)        Allergies   Allergen Reactions     Nkda [No Known Drug Allergies]      Seasonal Allergies         FSH-7: No flowsheet data found.  **No family history of breast, ovarian and colon cancer**     Mammogram Screening: Recommended mammography every 1-2 years with patient discussion and risk factor consideration  Pertinent mammograms are reviewed under the imaging tab.    Pertinent mammograms are reviewed under the imaging tab.  History of abnormal Pap smear: NO - age 30-65 PAP every 5 years with negative HPV co-testing recommended  PAP / HPV Latest Ref Rng & Units 8/13/2019 5/27/2016 4/5/2013   PAP - NIL NIL NIL   HPV 16 DNA NEG:Negative Negative - -   HPV 18 DNA NEG:Negative Negative - -   OTHER HR HPV NEG:Negative Negative - -     Reviewed and updated as needed this visit by clinical staff  Tobacco  Allergies  Meds  Problems  Med Hx  Surg Hx  Fam Hx  Soc Hx          Reviewed and updated as needed this visit by Provider  Tobacco  Allergies  Meds  Problems  Med Hx  Surg Hx  Fam Hx         Past Medical History:   Diagnosis Date     Lipoma      Melasma      Vitreous floaters 3/5/2013      Past Surgical History:   Procedure Laterality Date     COLONOSCOPY  3/26/2012    Procedure:COLONOSCOPY; COLONOSCOPY, SCREEN; Surgeon:MICAH RICHARDSON; Location:[facility] OR     NASAL ENDOSCOPY N/A 9/10/2020    Procedure: Removal of Nasal Implant, Rigid Nasal Endoscopy;  Surgeon: Yanci Perry MD;  Location: [facility] OR     NOSE SURGERY  2011    dorsal splint       ROS:  CONSTITUTIONAL: NEGATIVE for fever, chills, change in weight  INTEGUMENTARY/SKIN: NEGATIVE for worrisome rashes, moles or lesions  EYES: NEGATIVE for vision changes or irritation  ENT: NEGATIVE for ear, mouth and throat problems  RESP: NEGATIVE for significant cough or SOB  BREAST: NEGATIVE for masses, tenderness or discharge  CV: NEGATIVE for chest pain, palpitations or peripheral edema  GI: NEGATIVE for nausea, abdominal pain, heartburn, or change in bowel habits  : NEGATIVE for unusual urinary or vaginal symptoms. No vaginal  bleeding.  MUSCULOSKELETAL: NEGATIVE for significant arthralgias or myalgia  NEURO: NEGATIVE for weakness, dizziness or paresthesias  ENDOCRINE: NEGATIVE for temperature intolerance, skin/hair changes  HEME/ALLERGY/IMMUNE: NEGATIVE for bleeding problems  PSYCHIATRIC: NEGATIVE for changes in mood or affect     OBJECTIVE:   /60 (BP Location: Left arm, Patient Position: Chair, Cuff Size: Adult Regular)   Pulse 70   Temp 98.6  F (37  C) (Tympanic)   Resp 16   Wt 49.9 kg (110 lb)   LMP 09/09/2000 (Approximate)   SpO2 97%   BMI 21.48 kg/m    EXAM:  GENERAL APPEARANCE: healthy, alert and no distress  EYES: Eyes grossly normal to inspection and conjunctivae and sclerae normal  HENT: nose and mouth without ulcers or lesions  NECK: no adenopathy and trachea midline and normal to palpation  RESP: lungs clear to auscultation - no rales, rhonchi or wheezes  CV: regular rates and rhythm, normal S1 S2, no S3 or S4 and no murmur, click or rub  ABDOMEN: soft, non-tender and no rebound or guarding   MS: extremities normal- no gross deformities noted and peripheral pulses normal  SKIN: no suspicious lesions or rashes  NEURO: Normal strength and tone, mentation intact and speech normal  PSYCH: mentation appears normal      Diagnostic Test Results:  Labs reviewed in Epic  No results found for this or any previous visit (from the past 24 hour(s)).    ASSESSMENT/PLAN:   Juanita Johns was seen today for physical.    Diagnoses and all orders for this visit:    Routine general medical examination at a health care facility  -     REVIEW OF HEALTH MAINTENANCE PROTOCOL ORDERS  -     Lipid panel reflex to direct LDL Fasting  -     Glucose  -USPSTF guidelines reviewed    Screen for colon cancer  -     GASTROENTEROLOGY ADULT REF PROCEDURE ONLY; Future  -last done in 2012, repeat in 5 years     Adenomatous polyp of colon, unspecified part of colon  -     GASTROENTEROLOGY ADULT REF PROCEDURE ONLY; Future    Encounter for immunization  -      ZOSTER VACCINE RECOMBINANT ADJUVANTED IM NJX        Patient has been advised of split billing requirements and indicates understanding: Yes  COUNSELING:   Reviewed preventive health counseling, as reflected in patient instructions       Regular exercise       Healthy diet/nutrition       Vision screening       Immunizations    Vaccinated for: Zoster          Estimated body mass index is 21.48 kg/m  as calculated from the following:    Height as of 3/24/21: 1.524 m (5').    Weight as of this encounter: 49.9 kg (110 lb).        She reports that she has never smoked. She has never used smokeless tobacco.      Counseling Resources:  ATP IV Guidelines  Pooled Cohorts Equation Calculator  Breast Cancer Risk Calculator  BRCA-Related Cancer Risk Assessment: FHS-7 Tool  FRAX Risk Assessment  ICSI Preventive Guidelines  Dietary Guidelines for Americans, 2010  USDA's MyPlate  ASA Prophylaxis  Lung CA Screening    Milagro Acosta MD MPH    [facility]

## 2021-04-23 NOTE — RESULT ENCOUNTER NOTE
Please CALL patient:    Dear Juanita Galvan,    LDL Cholesterol is still high. Should be less than 160, and your level is at 179. I would recommend we start a cholesterol lowering medication (called a statin) that is taken once a day. If you would like to proceed with this, please let me know and I can send this in to your pharmacy.    Glucose is normal, you do not have diabetes.    Please let me know if you have any questions and thank you for choosing [facility].      Regards,    Milagro Acosta MD MPH

## 2021-04-23 NOTE — NURSING NOTE
Prior to immunization administration, verified patients identity using patient s name and date of birth. Please see Immunization Activity for additional information.     Screening Questionnaire for Adult Immunization    Are you sick today?   No   Do you have allergies to medications, food, a vaccine component or latex?   No   Have you ever had a serious reaction after receiving a vaccination?   No   Do you have a long-term health problem with heart, lung, kidney, or metabolic disease (e.g., diabetes), asthma, a blood disorder, no spleen, complement component deficiency, a cochlear implant, or a spinal fluid leak?  Are you on long-term aspirin therapy?   No   Do you have cancer, leukemia, HIV/AIDS, or any other immune system problem?   No   Do you have a parent, brother, or sister with an immune system problem?   No   In the past 3 months, have you taken medications that affect  your immune system, such as prednisone, other steroids, or anticancer drugs; drugs for the treatment of rheumatoid arthritis, Crohn s disease, or psoriasis; or have you had radiation treatments?   No   Have you had a seizure, or a brain or other nervous system problem?   No   During the past year, have you received a transfusion of blood or blood    products, or been given immune (gamma) globulin or antiviral drug?   No   For women: Are you pregnant or is there a chance you could become       pregnant during the next month?   No   Have you received any vaccinations in the past 4 weeks?   No     Immunization questionnaire answers were all negative.        Per orders of Dr. Acosta, injection of Shingrix given by Saira Gallagher MA. Patient instructed to remain in clinic for 15 minutes afterwards, and to report any adverse reaction to me immediately.       Screening performed by Saira Gallagher MA on 4/23/2021 at 7:31 AM.

## 2021-04-23 NOTE — PATIENT INSTRUCTIONS
Preventive Health Recommendations  Female Ages 50 - 64    Yearly exam: See your health care provider every year in order to  o Review health changes.   o Discuss preventive care.    o Review your medicines if your doctor has prescribed any.      Get a Pap test every three years (unless you have an abnormal result and your provider advises testing more often).    If you get Pap tests with HPV test, you only need to test every 5 years, unless you have an abnormal result.     You do not need a Pap test if your uterus was removed (hysterectomy) and you have not had cancer.    You should be tested each year for STDs (sexually transmitted diseases) if you're at risk.     Have a mammogram every 1 to 2 years.    Have a colonoscopy at age 50, or have a yearly FIT test (stool test). These exams screen for colon cancer.      Have a cholesterol test every 5 years, or more often if advised.    Have a diabetes test (fasting glucose) every three years. If you are at risk for diabetes, you should have this test more often.     If you are at risk for osteoporosis (brittle bone disease), think about having a bone density scan (DEXA).    Shots: Get a flu shot each year. Get a tetanus shot every 10 years.    Nutrition:     Eat at least 5 servings of fruits and vegetables each day.    Eat whole-grain bread, whole-wheat pasta and brown rice instead of white grains and rice.    Get adequate Calcium and Vitamin D.     Lifestyle    Exercise at least 150 minutes a week (30 minutes a day, 5 days a week). This will help you control your weight and prevent disease.    Limit alcohol to one drink per day.    No smoking.     Wear sunscreen to prevent skin cancer.     See your dentist every six months for an exam and cleaning.    See your eye doctor every 1 to 2 years.  At [facility], we strive to deliver an exceptional experience to you, every time we see you. If you receive a survey, please complete it as we do  value your feedback.  If you have MyChart, you can expect to receive results automatically within 24 hours of their completion.  Your provider will send a note interpreting your results as well.   If you do not have MyChart, you should receive your results in about a week by mail.    Your care team:                            Family Medicine Internal Medicine   MD Kumar Moss MD Shantel Branch-Fleming, MD Masoud Lovell, MD Whitney Gates, PAYULIA Stubbs, APRLISSETTE Lin, MD Pediatrics   Arya Justice, NHI Wild, MD Mariella Strickland APRN CNP   MD Corinne Ariza MD Deborah Mielke, MD Kim Thein, APR[facility]      Clinic hours: Monday - Thursday 7 am-6 pm; Fridays 7 am-5 pm.   Urgent care: Monday - Friday 10 am- 8 pm; Saturday and Sunday 9 am-5 pm.    Clinic: [phone]       [facility]: Monday - Thursday 8 am - 7 pm; Friday 8 am - 6 pm  [facility]: [phone]     Use [email or web address] for 24/7 diagnosis and treatment of dozens of conditions.

## 2021-04-26 ENCOUNTER — APPOINTMENT (OUTPATIENT)
Dept: INTERPRETER SERVICES | Facility: CLINIC | Age: 66
End: 2021-04-26
Payer: COMMERCIAL

## 2021-04-26 ENCOUNTER — TELEPHONE (OUTPATIENT)
Dept: FAMILY MEDICINE | Facility: CLINIC | Age: 66
End: 2021-04-26

## 2021-04-26 DIAGNOSIS — E78.2 MIXED HYPERLIPIDEMIA: Primary | ICD-10-CM

## 2021-04-26 NOTE — LETTER
Juanita Galvan  [address] [address]          04/27/21      Dear Juanita Galvan      I have sent in a script for a cholesterol medication. Let's recheck fasting cholesterol in 6 months, future labs orders are in the system.     Thank you,  Milagro Acosta MD MPH

## 2021-04-26 NOTE — TELEPHONE ENCOUNTER
With Frisian , called patient at home number.  There is no voicemail set up.      Called patient daughter and left message to have her mother contact the clinic of Dr. Acosta.  Please see Patient's Phone comments.  There is no signed permission to give PHI to patient although patient has requested in in her chart.    Dear Juanita Galvan,     LDL Cholesterol is still high. Should be less than 160, and your level is at 179. I would recommend we start a cholesterol lowering medication (called a statin) that is taken once a day. If you would like to proceed with this, please let me know and I can send this in to your pharmacy.     Glucose is normal, you do not have diabetes.     Please let me know if you have any questions and thank you for choosing [facility].        Regards,     Milagro Acosta MD MPH

## 2021-04-27 ENCOUNTER — APPOINTMENT (OUTPATIENT)
Dept: INTERPRETER SERVICES | Facility: CLINIC | Age: 66
End: 2021-04-27
Payer: COMMERCIAL

## 2021-04-27 RX ORDER — ATORVASTATIN CALCIUM 20 MG/1
20 TABLET, FILM COATED ORAL DAILY
Qty: 90 TABLET | Refills: 3 | Status: SHIPPED | OUTPATIENT
Start: 2021-04-27 | End: 2022-05-04

## 2021-04-27 NOTE — CONFIDENTIAL NOTE
I have sent in a script for a cholesterol medication. Let's recheck fasting cholesterol in 6 months, future labs orders are in the system.    Thank you,  Milagro Acosta MD MPH

## 2021-04-27 NOTE — TELEPHONE ENCOUNTER
With Icelandic , called the patient.  The voicemail box is not set up yet.    With [place]ic , called patient daughter.  Discussed with Vale, there is no permission to speak to her regarding PHI.    Vale merged call with patient and .    Gave message per Dr. Acosta. The patient verbalized agreement to plan.    Will route to Dr. Acosta to please send the medication to [facility] pharmacy.    Bhumika Langford RN, [facility]

## 2021-04-27 NOTE — TELEPHONE ENCOUNTER
"I have sent in a script for a cholesterol medication. Let's recheck fasting cholesterol in 6 months, future labs orders are in the system.     Thank you,  Milagro Acosta MD MPH         Called and \"no voicemail set up\".  Sent letter.  Diana Davila [facility]  2nd Floor  Primary Care    "

## 2021-04-29 ENCOUNTER — THERAPY VISIT (OUTPATIENT)
Dept: PHYSICAL THERAPY | Facility: CLINIC | Age: 66
End: 2021-04-29
Payer: COMMERCIAL

## 2021-04-29 DIAGNOSIS — M25.562 LEFT KNEE PAIN: ICD-10-CM

## 2021-04-29 PROCEDURE — 97530 THERAPEUTIC ACTIVITIES: CPT | Mod: GP | Performed by: PHYSICAL THERAPIST

## 2021-04-29 PROCEDURE — 97110 THERAPEUTIC EXERCISES: CPT | Mod: GP | Performed by: PHYSICAL THERAPIST

## 2021-05-03 ENCOUNTER — THERAPY VISIT (OUTPATIENT)
Dept: PHYSICAL THERAPY | Facility: CLINIC | Age: 66
End: 2021-05-03
Payer: COMMERCIAL

## 2021-05-03 DIAGNOSIS — M25.562 LEFT KNEE PAIN: ICD-10-CM

## 2021-05-03 PROCEDURE — 97110 THERAPEUTIC EXERCISES: CPT | Mod: GP | Performed by: PHYSICAL THERAPIST

## 2021-05-03 PROCEDURE — 97530 THERAPEUTIC ACTIVITIES: CPT | Mod: GP | Performed by: PHYSICAL THERAPIST

## 2021-05-03 PROCEDURE — 97112 NEUROMUSCULAR REEDUCATION: CPT | Mod: GP | Performed by: PHYSICAL THERAPIST

## 2021-05-19 DIAGNOSIS — Z11.59 ENCOUNTER FOR SCREENING FOR OTHER VIRAL DISEASES: ICD-10-CM

## 2021-05-28 RX ORDER — SODIUM, POTASSIUM,MAG SULFATES 17.5-3.13G
1 SOLUTION, RECONSTITUTED, ORAL ORAL SEE ADMIN INSTRUCTIONS
Qty: 1 ML | Refills: 0 | Status: SHIPPED | OUTPATIENT
Start: 2021-05-28 | End: 2022-11-01

## 2021-05-28 RX ORDER — BISACODYL 5 MG/1
15 TABLET, DELAYED RELEASE ORAL SEE ADMIN INSTRUCTIONS
Qty: 3 TABLET | Refills: 0 | Status: SHIPPED | OUTPATIENT
Start: 2021-05-28 | End: 2022-11-01

## 2021-05-31 ENCOUNTER — TRANSFERRED RECORDS (OUTPATIENT)
Dept: MULTI SPECIALTY CLINIC | Facility: CLINIC | Age: 66
End: 2021-05-31

## 2021-06-04 DIAGNOSIS — Z11.59 ENCOUNTER FOR SCREENING FOR OTHER VIRAL DISEASES: ICD-10-CM

## 2021-06-04 LAB
SARS-COV-2 RNA RESP QL NAA+PROBE: NORMAL
SPECIMEN SOURCE: NORMAL

## 2021-06-04 PROCEDURE — U0003 INFECTIOUS AGENT DETECTION BY NUCLEIC ACID (DNA OR RNA); SEVERE ACUTE RESPIRATORY SYNDROME CORONAVIRUS 2 (SARS-COV-2) (CORONAVIRUS DISEASE [COVID-19]), AMPLIFIED PROBE TECHNIQUE, MAKING USE OF HIGH THROUGHPUT TECHNOLOGIES AS DESCRIBED BY CMS-2020-01-R: HCPCS | Performed by: INTERNAL MEDICINE

## 2021-06-04 PROCEDURE — U0005 INFEC AGEN DETEC AMPLI PROBE: HCPCS | Performed by: INTERNAL MEDICINE

## 2021-06-05 LAB
LABORATORY COMMENT REPORT: NORMAL
SARS-COV-2 RNA RESP QL NAA+PROBE: NEGATIVE
SPECIMEN SOURCE: NORMAL

## 2021-06-07 ENCOUNTER — HOSPITAL ENCOUNTER (OUTPATIENT)
Facility: AMBULATORY SURGERY CENTER | Age: 66
Discharge: HOME | End: 2021-06-07
Attending: INTERNAL MEDICINE | Admitting: INTERNAL MEDICINE
Payer: COMMERCIAL

## 2021-06-07 VITALS
OXYGEN SATURATION: 100 % | HEART RATE: 69 BPM | RESPIRATION RATE: 16 BRPM | SYSTOLIC BLOOD PRESSURE: 120 MMHG | DIASTOLIC BLOOD PRESSURE: 68 MMHG | TEMPERATURE: 96.3 F

## 2021-06-07 DIAGNOSIS — Z12.11 SPECIAL SCREENING FOR MALIGNANT NEOPLASMS, COLON: Primary | ICD-10-CM

## 2021-06-07 LAB — COLONOSCOPY: NORMAL

## 2021-06-07 PROCEDURE — G8907 PT DOC NO EVENTS ON DISCHARG: HCPCS

## 2021-06-07 PROCEDURE — 45378 DIAGNOSTIC COLONOSCOPY: CPT

## 2021-06-07 PROCEDURE — G8918 PT W/O PREOP ORDER IV AB PRO: HCPCS

## 2021-06-07 RX ORDER — LIDOCAINE 40 MG/G
CREAM TOPICAL
Status: DISCONTINUED | OUTPATIENT
Start: 2021-06-07 | End: 2021-06-08 | Stop reason: HOSPADM

## 2021-06-07 RX ORDER — NALOXONE HYDROCHLORIDE 0.4 MG/ML
0.2 INJECTION, SOLUTION INTRAMUSCULAR; INTRAVENOUS; SUBCUTANEOUS
Status: DISCONTINUED | OUTPATIENT
Start: 2021-06-07 | End: 2021-06-08 | Stop reason: HOSPADM

## 2021-06-07 RX ORDER — FLUMAZENIL 0.1 MG/ML
0.2 INJECTION, SOLUTION INTRAVENOUS
Status: ACTIVE | OUTPATIENT
Start: 2021-06-07 | End: 2021-06-07

## 2021-06-07 RX ORDER — NALOXONE HYDROCHLORIDE 0.4 MG/ML
0.4 INJECTION, SOLUTION INTRAMUSCULAR; INTRAVENOUS; SUBCUTANEOUS
Status: DISCONTINUED | OUTPATIENT
Start: 2021-06-07 | End: 2021-06-08 | Stop reason: HOSPADM

## 2021-06-07 RX ORDER — ONDANSETRON 4 MG/1
4 TABLET, ORALLY DISINTEGRATING ORAL EVERY 6 HOURS PRN
Status: DISCONTINUED | OUTPATIENT
Start: 2021-06-07 | End: 2021-06-08 | Stop reason: HOSPADM

## 2021-06-07 RX ORDER — ONDANSETRON 2 MG/ML
4 INJECTION INTRAMUSCULAR; INTRAVENOUS EVERY 6 HOURS PRN
Status: DISCONTINUED | OUTPATIENT
Start: 2021-06-07 | End: 2021-06-08 | Stop reason: HOSPADM

## 2021-06-07 RX ORDER — FENTANYL CITRATE 50 UG/ML
INJECTION, SOLUTION INTRAMUSCULAR; INTRAVENOUS PRN
Status: DISCONTINUED | OUTPATIENT
Start: 2021-06-07 | End: 2021-06-07 | Stop reason: HOSPADM

## 2021-06-07 RX ORDER — PROCHLORPERAZINE MALEATE 10 MG
10 TABLET ORAL EVERY 6 HOURS PRN
Status: DISCONTINUED | OUTPATIENT
Start: 2021-06-07 | End: 2021-06-08 | Stop reason: HOSPADM

## 2021-06-07 RX ORDER — ONDANSETRON 2 MG/ML
4 INJECTION INTRAMUSCULAR; INTRAVENOUS
Status: DISCONTINUED | OUTPATIENT
Start: 2021-06-07 | End: 2021-06-08 | Stop reason: HOSPADM

## 2021-06-08 PROBLEM — M25.562 LEFT KNEE PAIN: Status: RESOLVED | Noted: 2021-04-21 | Resolved: 2021-06-08

## 2021-06-08 NOTE — PROGRESS NOTES
Discharge Note    Progress reporting period is from initial evaluation date (please see noted date below) to May 3, 2021.  No linked episodes      Juanita Johns failed to follow up and current status is unknown.  Please see information below for last relevant information on current status.  Patient seen for 3 visits.    SUBJECTIVE  Subjective changes noted by patient:  Patient reports that she is doing better. She has less pain and is feeling stronger. It is easier to walk and less pain to do stairs.   .  Current pain level is 0/10.     Previous pain level was   .   Changes in function:  Yes (See Goal flowsheet attached for changes in current functional level)  Adverse reaction to treatment or activity: None    OBJECTIVE  Changes noted in objective findings: Patient has improved tolerance to exercises. Endurance improved.      ASSESSMENT/PLAN  Diagnosis: L knee pain   Updated problem list and treatment plan:   Pain - HEP  Decreased ROM/flexibility - HEP  Decreased function - HEP  Decreased strength - HEP  Impaired muscle performance - HEP  Decreased proprioception - HEP  STG/LTGs have been met or progress has been made towards goals:  Yes, please see goal flowsheet for most current information  Assessment of Progress: current status is unknown.    Last current status: Pt is progressing as expected   Self Management Plans:  HEP  I have re-evaluated this patient and find that the nature, scope, duration and intensity of the therapy is appropriate for the medical condition of the patient.  Juanita Johns continues to require the following intervention to meet STG and LTG's:  HEP.    Recommendations:  Discharge with current home program.  Patient to follow up with MD as needed.    Please refer to the daily flowsheet for treatment today, total treatment time and time spent performing 1:1 timed codes.

## 2021-07-16 ENCOUNTER — ANCILLARY PROCEDURE (OUTPATIENT)
Dept: MAMMOGRAPHY | Facility: CLINIC | Age: 66
End: 2021-07-16
Attending: PREVENTIVE MEDICINE
Payer: COMMERCIAL

## 2021-07-16 DIAGNOSIS — Z12.31 VISIT FOR SCREENING MAMMOGRAM: ICD-10-CM

## 2021-07-16 PROCEDURE — 77067 SCR MAMMO BI INCL CAD: CPT | Mod: TC | Performed by: RADIOLOGY

## 2021-07-16 PROCEDURE — 77063 BREAST TOMOSYNTHESIS BI: CPT | Mod: TC | Performed by: RADIOLOGY

## 2021-10-05 NOTE — PROGRESS NOTES
"    Assessment & Plan     Mixed hyperlipidemia  -recheck labs today  -did not start statin  - Lipid panel reflex to direct LDL Fasting    LDL Cholesterol Calculated   Date Value Ref Range Status   04/23/2021 179 (H) <100 mg/dL Final     Comment:     Above desirable:  100-129 mg/dl  Borderline High:  130-159 mg/dL  High:             160-189 mg/dL  Very high:       >189 mg/dl         Cough  -for 2 weeks  -symptoms improving  -only at night  -Lung exam normal  -could be from underlying acid reflux  -take over the counter Famotidine 20 mg daily  -if cough not resolved in 2 weeks then needs further work up and X ray       Ordering of each unique test  20 minutes spent on the date of the encounter doing chart review, history and exam, documentation and further activities per the note        Return in about 6 months (around 4/8/2022) for Follow up, Routine preventive, with me, in person.    Milagro Acosta MD MPH    [facility]    Jose Johns is a 65 year old who presents for the following health issues :    HPI         Has been coughing at night:  -2 weeks  -only at night  -no fever  -Theraflu+  -No problems breathing  -getting better  -no nasal sxs  -no PND  -no acid reflux  -no sick contacts   -no pedal edema  -no orthopnea      High cholesterol:  -not taking the medication  -no time for exercise  -careful about her food  -limited meat intake    Will get Flu vaccine at work     Review of Systems   Constitutional, HEENT, cardiovascular, pulmonary, gi and gu systems are negative, except as otherwise noted.      Objective    /75 (BP Location: Left arm, Patient Position: Chair, Cuff Size: Adult Regular)   Pulse 68   Temp 96.9  F (36.1  C) (Tympanic)   Ht 1.492 m (4' 10.75\")   Wt 49.7 kg (109 lb 9.6 oz)   LMP 09/09/2000 (Approximate)   SpO2 100%   Breastfeeding No   BMI 22.33 kg/m    Body mass index is 22.33 kg/m .  Physical Exam   GENERAL APPEARANCE: healthy, alert and no " distress  EYES: Eyes grossly normal to inspection and conjunctivae and sclerae normal  RESP: lungs clear to auscultation - no rales, rhonchi or wheezes  CV: regular rates and rhythm, normal S1 S2, no S3 or S4 and no murmur, click or rub  ABDOMEN: soft, non-tender and no rebound or guarding   MS: extremities normal- no gross deformities noted and peripheral pulses normal  SKIN: no suspicious lesions or rashes  NEURO: Normal strength and tone, mentation intact and speech normal  PSYCH: mentation appears normal      No results found for this or any previous visit (from the past 24 hour(s)).

## 2021-10-05 NOTE — PATIENT INSTRUCTIONS
At [facility], we strive to deliver an exceptional experience to you, every time we see you. If you receive a survey, please complete it as we do value your feedback.  If you have MyChart, you can expect to receive results automatically within 24 hours of their completion.  Your provider will send a note interpreting your results as well.   If you do not have MyChart, you should receive your results in about a week by mail.    Your care team:                            Family Medicine Internal Medicine   MD Kumar Moss MD Shantel Branch-Fleming, MD Srinivasa Vaka, MD Katya Belousova, PAYULIA Stubbs, APRN CNP    Enrrique Lin, MD Pediatrics   rAya Justice, PAYULIA Wild, CNP MD Mariella Champion APRN CNP   MD Corinne Ariza MD Deborah Mielke, MD Deepthi Salinas, APRN [facility]      Clinic hours: Monday - Thursday 7 am-6 pm; Fridays 7 am-5 pm.   Urgent care: Monday - Friday 10 am- 8 pm; Saturday and Sunday 9 am-5 pm.    Clinic: [phone]       [facility]: Monday - Thursday 8 am - 7 pm; Friday 8 am - 6 pm  [facility]: [phone]     Use [email or web address] for 24/7 diagnosis and treatment of dozens of conditions.

## 2021-10-08 ENCOUNTER — OFFICE VISIT (OUTPATIENT)
Dept: FAMILY MEDICINE | Facility: CLINIC | Age: 66
End: 2021-10-08
Payer: COMMERCIAL

## 2021-10-08 VITALS
TEMPERATURE: 96.9 F | WEIGHT: 109.6 LBS | HEIGHT: 59 IN | DIASTOLIC BLOOD PRESSURE: 75 MMHG | HEART RATE: 68 BPM | OXYGEN SATURATION: 100 % | BODY MASS INDEX: 22.09 KG/M2 | SYSTOLIC BLOOD PRESSURE: 120 MMHG

## 2021-10-08 DIAGNOSIS — R05.9 COUGH: ICD-10-CM

## 2021-10-08 DIAGNOSIS — E78.2 MIXED HYPERLIPIDEMIA: Primary | ICD-10-CM

## 2021-10-08 LAB
ALT SERPL W P-5'-P-CCNC: 28 U/L (ref 0–50)
CHOLEST SERPL-MCNC: 166 MG/DL
FASTING STATUS PATIENT QL REPORTED: YES
HDLC SERPL-MCNC: 53 MG/DL
LDLC SERPL CALC-MCNC: 84 MG/DL
NONHDLC SERPL-MCNC: 113 MG/DL
TRIGL SERPL-MCNC: 146 MG/DL

## 2021-10-08 PROCEDURE — 80061 LIPID PANEL: CPT | Performed by: PREVENTIVE MEDICINE

## 2021-10-08 PROCEDURE — 84460 ALANINE AMINO (ALT) (SGPT): CPT | Performed by: PREVENTIVE MEDICINE

## 2021-10-08 PROCEDURE — 36415 COLL VENOUS BLD VENIPUNCTURE: CPT | Performed by: PREVENTIVE MEDICINE

## 2021-10-08 PROCEDURE — 99213 OFFICE O/P EST LOW 20 MIN: CPT | Performed by: PREVENTIVE MEDICINE

## 2021-10-08 ASSESSMENT — MIFFLIN-ST. JEOR: SCORE: 953.8

## 2021-10-08 ASSESSMENT — PAIN SCALES - GENERAL: PAINLEVEL: NO PAIN (0)

## 2021-10-08 NOTE — RESULT ENCOUNTER NOTE
Please send a letter:    Dear Juanita Galvan,    Cholesterol is much better than last time. We do not have to use cholesterol medication at this time since your numbers are much better.  Liver function test ALT is normal.  Please let me know if you have any questions and thank you for choosing [facility].    Regards,    Mialgro Acosta MD MPH

## 2021-10-08 NOTE — LETTER
October 11, 2021      Juanita Galvan  [address]  [place] [address]        Dear Ms.Mandy,    We are writing to inform you of your test results.    Cholesterol is much better than last time. We do not have to use cholesterol medication at this time since your numbers are much better.   Liver function test ALT is normal.   Please let me know if you have any questions and thank you for choosing [facility].       Resulted Orders   Lipid panel reflex to direct LDL Fasting   Result Value Ref Range    Cholesterol 166 <200 mg/dL    Triglycerides 146 <150 mg/dL    Direct Measure HDL 53 >=50 mg/dL    LDL Cholesterol Calculated 84 <=100 mg/dL    Non HDL Cholesterol 113 <130 mg/dL    Patient Fasting > 8hrs? Yes     Narrative    Cholesterol  Desirable:  <200 mg/dL    Triglycerides  Normal:  Less than 150 mg/dL  Borderline High:  150-199 mg/dL  High:  200-499 mg/dL  Very High:  Greater than or equal to 500 mg/dL    Direct Measure HDL  Female:  Greater than or equal to 50 mg/dL   Male:  Greater than or equal to 40 mg/dL    LDL Cholesterol  Desirable:  <100mg/dL  Above Desirable:  100-129 mg/dL   Borderline High:  130-159 mg/dL   High:  160-189 mg/dL   Very High:  >= 190 mg/dL    Non HDL Cholesterol  Desirable:  130 mg/dL  Above Desirable:  130-159 mg/dL  Borderline High:  160-189 mg/dL  High:  190-219 mg/dL  Very High:  Greater than or equal to 220 mg/dL   ALT   Result Value Ref Range    ALT 28 0 - 50 U/L       If you have any questions or concerns, please call the clinic at the number listed above.       Sincerely,      Milagro Acosta MD

## 2022-03-18 ENCOUNTER — OFFICE VISIT (OUTPATIENT)
Dept: URGENT CARE | Facility: URGENT CARE | Age: 67
End: 2022-03-18
Payer: COMMERCIAL

## 2022-03-18 VITALS
TEMPERATURE: 97.9 F | OXYGEN SATURATION: 100 % | BODY MASS INDEX: 23.67 KG/M2 | HEART RATE: 67 BPM | WEIGHT: 116.2 LBS | DIASTOLIC BLOOD PRESSURE: 68 MMHG | SYSTOLIC BLOOD PRESSURE: 147 MMHG

## 2022-03-18 DIAGNOSIS — R42 DIZZINESS: Primary | ICD-10-CM

## 2022-03-18 DIAGNOSIS — B34.9 VIRAL ILLNESS: ICD-10-CM

## 2022-03-18 LAB
BASOPHILS # BLD AUTO: 0.1 10E3/UL (ref 0–0.2)
BASOPHILS NFR BLD AUTO: 2 %
EOSINOPHIL # BLD AUTO: 0.1 10E3/UL (ref 0–0.7)
EOSINOPHIL NFR BLD AUTO: 3 %
ERYTHROCYTE [DISTWIDTH] IN BLOOD BY AUTOMATED COUNT: 14.1 % (ref 10–15)
FLUAV AG SPEC QL IA: NEGATIVE
FLUBV AG SPEC QL IA: NEGATIVE
HCT VFR BLD AUTO: 39.3 % (ref 35–47)
HGB BLD-MCNC: 12 G/DL (ref 11.7–15.7)
IMM GRANULOCYTES # BLD: 0 10E3/UL
IMM GRANULOCYTES NFR BLD: 1 %
LYMPHOCYTES # BLD AUTO: 1.2 10E3/UL (ref 0.8–5.3)
LYMPHOCYTES NFR BLD AUTO: 30 %
MCH RBC QN AUTO: 22.1 PG (ref 26.5–33)
MCHC RBC AUTO-ENTMCNC: 30.5 G/DL (ref 31.5–36.5)
MCV RBC AUTO: 72 FL (ref 78–100)
MONOCYTES # BLD AUTO: 0.3 10E3/UL (ref 0–1.3)
MONOCYTES NFR BLD AUTO: 8 %
NEUTROPHILS # BLD AUTO: 2.2 10E3/UL (ref 1.6–8.3)
NEUTROPHILS NFR BLD AUTO: 57 %
PLATELET # BLD AUTO: 244 10E3/UL (ref 150–450)
RBC # BLD AUTO: 5.43 10E6/UL (ref 3.8–5.2)
WBC # BLD AUTO: 3.9 10E3/UL (ref 4–11)

## 2022-03-18 PROCEDURE — 36415 COLL VENOUS BLD VENIPUNCTURE: CPT | Performed by: NURSE PRACTITIONER

## 2022-03-18 PROCEDURE — 85025 COMPLETE CBC W/AUTO DIFF WBC: CPT | Performed by: NURSE PRACTITIONER

## 2022-03-18 PROCEDURE — 99213 OFFICE O/P EST LOW 20 MIN: CPT | Performed by: NURSE PRACTITIONER

## 2022-03-18 PROCEDURE — 93000 ELECTROCARDIOGRAM COMPLETE: CPT | Performed by: NURSE PRACTITIONER

## 2022-03-18 PROCEDURE — 87804 INFLUENZA ASSAY W/OPTIC: CPT | Performed by: NURSE PRACTITIONER

## 2022-03-18 ASSESSMENT — ENCOUNTER SYMPTOMS
HEADACHES: 1
DIZZINESS: 1
MYALGIAS: 1

## 2022-03-18 NOTE — PATIENT INSTRUCTIONS
Patient Education     Dizziness (Uncertain Cause)  Dizziness is a common symptom. It may be described as lightheadedness, spinning, or feeling like you are going to faint. Dizziness can have many causes.   Tell the healthcare provider about:     All medicines you take, including prescription, over-the-counter, herbs, and supplements    Any other symptoms you have    Any health problems you are being treated for    Any past major health problems you've had, such as a heart attack, balance issues, hearing problems, or blood pressure problems    Anything that causes the dizziness to get worse or better  Today's exam did not show an exact cause for your dizziness . Other tests may be needed. Follow up with your healthcare provider.   Home care    Dizziness that occurs with sudden standing may be a sign of mild dehydration. Drink extra fluids for the next few days.    If you recently started a new medicine, stopped a medicine, or had the dose of a current medicine changed, talk with the prescribing healthcare provider. Your medicine plan may need adjustment.    If dizziness lasts more than a few seconds, sit or lie down until it passes. This may help prevent injury in case you pass out. Get up slowly when you feel better.    Don't drive or use power tools or dangerous equipment until you have had no dizziness for at least 48 hours.    Follow-up care  Follow up with your healthcare provider for further evaluation in the next 7 days, or as advised.   When to get medical advice  Call your healthcare provider for any of the following:     Worsening of symptoms or new symptoms    Repeated vomiting    Headache    Vision or hearing changes  Call 911  Call 911, or get medical care right away if any of these occur:     Chest, arm, neck, back, or jaw pain    Weakness of an arm or leg or one side of the face    Blood in vomit or stool (black or red color)    Shortness of breath    Feeling that your heart is fluttering or beating  fast or hard (palpitations)    Passing out or seizure    Trouble walking or speaking  [employer] last reviewed this educational content on 2/1/2020 2000-2021 [employer]. All rights reserved. This information is not intended as a substitute for professional medical care. Always follow your healthcare professional's instructions.

## 2022-03-18 NOTE — PROGRESS NOTES
SUBJECTIVE:   Juanita Galvan is a 66 year old female presenting with a chief complaint of   Chief Complaint   Patient presents with     Generalized Body Aches     Headache     Dizziness       She is an established patient of [facility].    Dizziness, headache and bodyaches    Onset of symptoms was 2 day(s) ago.  Course of illness is worsening.    Severity moderate  Current and Associated symptoms: headache, body aches and dizziness  Treatment measures tried include None tried.  Predisposing factors include None and hypercholestremia.        Review of Systems   Musculoskeletal: Positive for myalgias.   Neurological: Positive for dizziness and headaches.   All other systems reviewed and are negative.      Past Medical History:   Diagnosis Date     Lipoma      Melasma      Vitreous floaters 3/5/2013     Family History   Problem Relation Age of Onset     No Known Problems Mother      No Known Problems Father      No Known Problems Brother      No Known Problems Sister      No Known Problems Sister      No Known Problems Sister      Cancer No family hx of      Diabetes No family hx of      Hypertension No family hx of      Cerebrovascular Disease No family hx of      Thyroid Disease No family hx of      Glaucoma No family hx of      Macular Degeneration No family hx of      Deep Vein Thrombosis No family hx of      Pulmonary Embolism No family hx of      Anesthesia Reaction No family hx of      Current Outpatient Medications   Medication Sig Dispense Refill     calcium carbonate (TUMS) 500 MG chewable tablet Take 1 chew tab by mouth 2 times daily Pt takes as needed       Multiple Vitamins-Minerals (MULTIVITAMIN ADULT PO) Take 1 tablet by mouth every morning       Omega-3 Fatty Acids (OMEGA 3 PO)        atorvastatin (LIPITOR) 20 MG tablet Take 1 tablet (20 mg) by mouth daily For cholesterol (Patient not taking: Reported on 3/18/2022) 90 tablet 3     bisacodyl (DULCOLAX) 5 MG EC tablet Take 3 tablets (15 mg) by mouth See Admin  Instructions --Take at 5 PM day prior to procedure (Patient not taking: Reported on 3/18/2022) 3 tablet 0     Emollient (CETAPHIL MOISTURIZING) CREA Externally apply topically daily (Patient not taking: Reported on 3/18/2022) 453 g 1     Na Sulfate-K Sulfate-Mg Sulf (SUPREP BOWEL PREP KIT) solution Take 177 mLs (1 Bottle) by mouth See Admin Instructions Take as directed in mailed out pt instructions (Patient not taking: Reported on 3/18/2022) 1 mL 0     polyethylene glycol (GOLYTELY) 236 g suspension Take 4,000 mLs by mouth See Admin Instructions --take as directed in mailed out pt instructions. (Patient not taking: Reported on 3/18/2022) 4000 mL 0     Simethicone 125 MG TABS Take 125 mg by mouth See Admin Instructions --Take tablet after finishing second half of Golytely with half a glass of water. (Patient not taking: Reported on 3/18/2022) 1 tablet 0     Simethicone 125 MG TABS Take 125 mg by mouth See Admin Instructions --Take tablet after finishing second half of Suprep with half a glass of water. (Patient not taking: Reported on 3/18/2022) 1 tablet 0     Social History     Tobacco Use     Smoking status: Never Smoker     Smokeless tobacco: Never Used   Substance Use Topics     Alcohol use: No       OBJECTIVE  BP (!) 147/68   Pulse 67   Temp 97.9  F (36.6  C) (Tympanic)   Wt 52.7 kg (116 lb 3.2 oz)   LMP 09/09/2000 (Approximate)   SpO2 100%   BMI 23.67 kg/m      Physical Exam  Constitutional:       General: She is not in acute distress.     Appearance: She is not diaphoretic.   HENT:      Head: Normocephalic and atraumatic.      Right Ear: Tympanic membrane, ear canal and external ear normal.      Left Ear: Tympanic membrane, ear canal and external ear normal.      Mouth/Throat:      Pharynx: No oropharyngeal exudate or posterior oropharyngeal erythema.      Tonsils: No tonsillar abscesses.   Eyes:      General:         Right eye: No discharge.         Left eye: No discharge.   Cardiovascular:      Rate  and Rhythm: Normal rate and regular rhythm.      Heart sounds: Normal heart sounds.   Pulmonary:      Effort: Pulmonary effort is normal. No respiratory distress.      Breath sounds: Normal breath sounds. No wheezing or rales.   Musculoskeletal:         General: Normal range of motion.      Cervical back: Normal range of motion and neck supple.   Lymphadenopathy:      Cervical: No cervical adenopathy.   Skin:     General: Skin is warm and dry.      Findings: No rash.   Neurological:      Mental Status: She is alert.      Cranial Nerves: No cranial nerve deficit.         ASSESSMENT:      ICD-10-CM    1. Dizziness  R42 EKG 12-lead complete w/read - Clinics     CBC with Platelets & Differential     Influenza A & B Antigen - Clinic Collect   2. Viral illness  B34.9         PLAN:  Plan of care as above  I discussed lab results with the patient.  I recommend follow up with PCP in 3 days or sooner if symptoms are getting worse  Worrisome symptoms are discussed and instructions to go to the ER.  All questions are answered and patient verbalized understanding and agrees with this plan.  Estella Saldana  [facility]  Family Nurse Practitoner            Patient Instructions     Patient Education     Dizziness (Uncertain Cause)  Dizziness is a common symptom. It may be described as lightheadedness, spinning, or feeling like you are going to faint. Dizziness can have many causes.   Tell the healthcare provider about:     All medicines you take, including prescription, over-the-counter, herbs, and supplements    Any other symptoms you have    Any health problems you are being treated for    Any past major health problems you've had, such as a heart attack, balance issues, hearing problems, or blood pressure problems    Anything that causes the dizziness to get worse or better  Today's exam did not show an exact cause for your dizziness . Other tests may be needed. Follow up with your healthcare provider.   Home care    Dizziness that  occurs with sudden standing may be a sign of mild dehydration. Drink extra fluids for the next few days.    If you recently started a new medicine, stopped a medicine, or had the dose of a current medicine changed, talk with the prescribing healthcare provider. Your medicine plan may need adjustment.    If dizziness lasts more than a few seconds, sit or lie down until it passes. This may help prevent injury in case you pass out. Get up slowly when you feel better.    Don't drive or use power tools or dangerous equipment until you have had no dizziness for at least 48 hours.    Follow-up care  Follow up with your healthcare provider for further evaluation in the next 7 days, or as advised.   When to get medical advice  Call your healthcare provider for any of the following:     Worsening of symptoms or new symptoms    Repeated vomiting    Headache    Vision or hearing changes  Call 911  Call 911, or get medical care right away if any of these occur:     Chest, arm, neck, back, or jaw pain    Weakness of an arm or leg or one side of the face    Blood in vomit or stool (black or red color)    Shortness of breath    Feeling that your heart is fluttering or beating fast or hard (palpitations)    Passing out or seizure    Trouble walking or speaking  [employer] last reviewed this educational content on 2/1/2020 2000-2021 [employer]. All rights reserved. This information is not intended as a substitute for professional medical care. Always follow your healthcare professional's instructions.

## 2022-08-02 ENCOUNTER — OFFICE VISIT (OUTPATIENT)
Dept: URGENT CARE | Facility: URGENT CARE | Age: 67
End: 2022-08-02
Payer: COMMERCIAL

## 2022-08-02 ENCOUNTER — ANCILLARY PROCEDURE (OUTPATIENT)
Dept: GENERAL RADIOLOGY | Facility: CLINIC | Age: 67
End: 2022-08-02
Attending: PHYSICIAN ASSISTANT
Payer: COMMERCIAL

## 2022-08-02 VITALS
DIASTOLIC BLOOD PRESSURE: 75 MMHG | OXYGEN SATURATION: 99 % | TEMPERATURE: 97.6 F | BODY MASS INDEX: 23.13 KG/M2 | HEART RATE: 72 BPM | HEIGHT: 58 IN | SYSTOLIC BLOOD PRESSURE: 152 MMHG | WEIGHT: 110.2 LBS

## 2022-08-02 DIAGNOSIS — R03.0 ELEVATED BLOOD PRESSURE READING WITHOUT DIAGNOSIS OF HYPERTENSION: ICD-10-CM

## 2022-08-02 DIAGNOSIS — M25.572 ACUTE LEFT ANKLE PAIN: ICD-10-CM

## 2022-08-02 DIAGNOSIS — M94.9 OSTEOCHONDRAL LESION OF TALAR DOME: ICD-10-CM

## 2022-08-02 DIAGNOSIS — M25.572 ACUTE LEFT ANKLE PAIN: Primary | ICD-10-CM

## 2022-08-02 DIAGNOSIS — M79.672 ACUTE FOOT PAIN, LEFT: ICD-10-CM

## 2022-08-02 DIAGNOSIS — M89.9 OSTEOCHONDRAL LESION OF TALAR DOME: ICD-10-CM

## 2022-08-02 PROCEDURE — 73610 X-RAY EXAM OF ANKLE: CPT | Mod: TC | Performed by: RADIOLOGY

## 2022-08-02 PROCEDURE — 99214 OFFICE O/P EST MOD 30 MIN: CPT | Performed by: PHYSICIAN ASSISTANT

## 2022-08-02 NOTE — PROGRESS NOTES
Chief Complaint   Patient presents with     Foot Swelling     Left foot swelling.         X-rays-I can see what looks like a osteochondral defect medial talar dome on ankle x-ray.  I cannot tell the age of it however.  No obvious stress fracture on foot x-ray.    Results for orders placed or performed in visit on 08/02/22   XR Foot Left G/E 3 Views     Status: None    Narrative    XR FOOT LEFT G/E 3 VIEWS   8/2/2022 1:40 PM     HISTORY:  Acute left ankle pain; Acute foot pain, left    Comparison: None.      Impression    IMPRESSION:    REX MARTIN MD         SYSTEM ID:  [id number]   Results for orders placed or performed in visit on 08/02/22   XR Ankle Left G/E 3 Views     Status: None (Preliminary result)    Narrative    LEFT ANKLE THREE OR MORE VIEWS   8/2/2022 1:40 PM     HISTORY:  Acute left ankle pain. Acute foot pain, left.    COMPARISON: None.      Impression    IMPRESSION: Soft tissue swelling about the lateral aspect of the  ankle. Moderate-sized medial corner talar dome osteochondral lesion  with some adjacent sclerosis suggesting that it is more likely  subacute or chronic rather than acute. Tiny Achilles enthesophyte of  doubtful significance. Otherwise negative.         ASSESSMENT:    ICD-10-CM    1. Acute left ankle pain  M25.572 XR Foot Left G/E 3 Views     XR Ankle Left G/E 3 Views     Orthopedic  Referral   2. Acute foot pain, left  M79.672 XR Foot Left G/E 3 Views     XR Ankle Left G/E 3 Views     Orthopedic  Referral   3. Osteochondral lesion of talar dome  M89.9 Orthopedic  Referral    M94.9    4. Elevated blood pressure reading without diagnosis of hypertension  R03.0            PLAN: Unclear etiology of foot/ankle pain. ?  Secondary to osteochondral lesion of talar dome.  Ice, Ace, elevate, OTC Advil 200 mg-3 tabs by mouth twice daily with food for 10 days.  See Ortho/podiatry.  Elevated blood pressure without diagnosis of hypertension.  Recheck outside of  "clinic and follow-up with primary if any concerns.    Oneida Mueller PA-C        SUBJECTIVE:  Juanita Galvan is an 66 year old female who presents with onset of acute left ankle and foot pain 4 days ago.  No specific injury.  Was just going from a seated to standing position and felt immediate pain.  Lasted all day long.  The next 3 days had no pain and then today tried to work and it was too painful to work after 2 hours.  No family history of gout or rheumatoid arthritis.  No fever.    Past Medical History:   Diagnosis Date     Lipoma      Melasma      Vitreous floaters 3/5/2013     History   Smoking Status     Never Smoker   Smokeless Tobacco     Never Used       ROS:  GEN no fevers  SKIN no erythema  Musculoskeletal:  See HPI.      OBJECTIVE:  Blood pressure (!) 152/75, pulse 72, temperature 97.6  F (36.4  C), temperature source Tympanic, height 1.473 m (4' 10\"), weight 50 kg (110 lb 3.2 oz), last menstrual period 09/09/2000, SpO2 99 %, not currently breastfeeding.  Patient is alert and NAD.  EYES: conjunctiva clear  Ankle/foot Exam (left):  Inspection/palpation: Swelling and tenderness around the lateral malleolus and dorsal medial midfoot.  Cap refill intact.    Good doralis pedis.  Neurovascularly Intact Distally.   Sensation to soft touch intact.  Full range of motion without pain.  Becomes very painful when she bears weight.  No calf tenderness or swelling.  Negative Homans.    Oneida Mueller PA-C    "

## 2022-08-10 ENCOUNTER — OFFICE VISIT (OUTPATIENT)
Dept: PODIATRY | Facility: CLINIC | Age: 67
End: 2022-08-10
Attending: PHYSICIAN ASSISTANT

## 2022-08-10 ENCOUNTER — ANCILLARY PROCEDURE (OUTPATIENT)
Dept: GENERAL RADIOLOGY | Facility: CLINIC | Age: 67
End: 2022-08-10
Attending: PODIATRIST
Payer: COMMERCIAL

## 2022-08-10 VITALS
WEIGHT: 110 LBS | HEIGHT: 58 IN | SYSTOLIC BLOOD PRESSURE: 148 MMHG | OXYGEN SATURATION: 99 % | BODY MASS INDEX: 23.09 KG/M2 | HEART RATE: 75 BPM | DIASTOLIC BLOOD PRESSURE: 78 MMHG

## 2022-08-10 DIAGNOSIS — M79.672 ACUTE FOOT PAIN, LEFT: ICD-10-CM

## 2022-08-10 DIAGNOSIS — M25.572 CHRONIC PAIN OF LEFT ANKLE: ICD-10-CM

## 2022-08-10 DIAGNOSIS — M19.072 ARTHRITIS OF ANKLE, LEFT: ICD-10-CM

## 2022-08-10 DIAGNOSIS — M25.572 ACUTE LEFT ANKLE PAIN: ICD-10-CM

## 2022-08-10 DIAGNOSIS — M94.9 OSTEOCHONDRAL LESION OF TALAR DOME: ICD-10-CM

## 2022-08-10 DIAGNOSIS — M89.9 OSTEOCHONDRAL LESION OF TALAR DOME: ICD-10-CM

## 2022-08-10 DIAGNOSIS — G89.29 CHRONIC PAIN OF LEFT ANKLE: ICD-10-CM

## 2022-08-10 DIAGNOSIS — M95.8 OSTEOCHONDRAL DEFECT OF ANKLE: ICD-10-CM

## 2022-08-10 DIAGNOSIS — M95.8 OSTEOCHONDRAL DEFECT OF ANKLE: Primary | ICD-10-CM

## 2022-08-10 PROCEDURE — 99204 OFFICE O/P NEW MOD 45 MIN: CPT | Performed by: PODIATRIST

## 2022-08-10 PROCEDURE — 73610 X-RAY EXAM OF ANKLE: CPT | Mod: TC | Performed by: RADIOLOGY

## 2022-08-10 NOTE — PATIENT INSTRUCTIONS
PATIENT INSTRUCTIONS - Podiatry / Foot & Ankle Surgery    Cyst of talus bone & early varus ankle arthritis      Diclofenac- 1 pill twice daily x1 week.  Then take a 1 week break.  Repeat as needed.  Take with food & an acid blocker if stomach upset occurs.  Stop all other NSAIDs (aspirin, ibuprofen/Motrin, naproxen/ Aleve).      Physical Therapy  You have been referred to [facility].  Locations can be found online.  Please call to schedule an appointment:  [phone]    Ankle   -ASO or TriLok type ankle brace with compression underneath (sock, sleeve, or Tubigrip).  Use this for significant activity for the next few months, especially on uneven ground.  Size 6.5 shoe    Physical Therapy  You have been referred to [facility].  Locations can be found online.  Please call to schedule an appointment:  [phone]      We can obtain CT scan if not improving for surgical consideration

## 2022-08-10 NOTE — LETTER
8/10/2022         RE: Juanita Galvan  [address]  [facility] [address]        Dear Colleague,    Thank you for referring your patient, Juanita Galvan, to the [facility]. Please see a copy of my visit note below.      Assessment:      ICD-10-CM    1. Osteochondral defect of ankle  M95.8 XR Ankle Left G/E 3 Views     diclofenac (VOLTAREN) 50 MG EC tablet     Physical Therapy Referral     Ankle/Foot Bracing Supplies Order for DME - ONLY FOR DME   2. Arthritis of ankle, left  M19.072 XR Ankle Left G/E 3 Views     diclofenac (VOLTAREN) 50 MG EC tablet     Physical Therapy Referral     Ankle/Foot Bracing Supplies Order for DME - ONLY FOR DME   3. Acute left ankle pain  M25.572 XR Ankle Left G/E 3 Views     Orthopedic  Referral     Ankle/Foot Bracing Supplies Order for DME - ONLY FOR DME          Plan:  Orders Placed This Encounter   Procedures     XR Ankle Left G/E 3 Views     Physical Therapy Referral     Ankle/Foot Bracing Supplies Order for DME - ONLY FOR DME       Discussed the etiology and treatment of the condition with the patient.  Imaging studies reviewed and discussed with the patient.  Discussed surgical and conservative options.    Varus ankle arthritis, OCD medial talar dome  First time it has hurt patient    -Injection- offered  -NSAID- Rx po today  -Brace- ASO.  -PT- referral  -Activity- limit uneven ground    Discussed surgical Tx, OCD repair, possible malleolar osteotomy.  CT prior to this.  Only if ankle is consistently painful.    Return:  Return in about 6 weeks (around 9/21/2022), or if symptoms worsen or fail to improve.                Chief Complaint:     Patient presents with:  Left Ankle - Pain  Consult     left ankle/foot pain    HPI:  Juanita Galvan is a 66 year old year old female who presents for evaluation of ankle pain.    Pain location- around the top of the ankle cant bend  Quality- pain is described as tight (pulling).  Severity- 5/10, symptoms are  unchanged.  Duration- 2 week(s).  Timing- symptom occurrence is morning.  Context- occurs while with normal daily activity.  Modifying factors- worsened by walking and improved by Ibuprofen.  Associated symptoms- no swelling.  Prior treatments- NSAIDS.      Pt is here w/  as interpretor  Denies spraining the Ankle.  Did recently Injure R Achilles 3 yrs ago, has noticed calf atrophy since then.  Duration of pain 2 weeks.    Has not hurt before this    L ankle visibly swollen, circumferentially, more than R      Past Medical & Surgical History:  Past Medical History:   Diagnosis Date     Lipoma      Melasma      Vitreous floaters 3/5/2013      Past Surgical History:   Procedure Laterality Date     COLONOSCOPY  3/26/2012    Procedure:COLONOSCOPY; COLONOSCOPY, SCREEN; Surgeon:MICAH RICHARDSON; Location:[facility] OR     COLONOSCOPY WITH CO2 INSUFFLATION N/A 6/7/2021    Procedure: COLONOSCOPY, WITH CO2 INSUFFLATION;  Surgeon: Angela Drummond DO;  Location: [facility] OR     NASAL ENDOSCOPY N/A 9/10/2020    Procedure: Removal of Nasal Implant, Rigid Nasal Endoscopy;  Surgeon: Yanci Perry MD;  Location: [facility] OR     NOSE SURGERY  2011    dorsal splint      Family History   Problem Relation Age of Onset     No Known Problems Mother      No Known Problems Father      No Known Problems Brother      No Known Problems Sister      No Known Problems Sister      No Known Problems Sister      Cancer No family hx of      Diabetes No family hx of      Hypertension No family hx of      Cerebrovascular Disease No family hx of      Thyroid Disease No family hx of      Glaucoma No family hx of      Macular Degeneration No family hx of      Deep Vein Thrombosis No family hx of      Pulmonary Embolism No family hx of      Anesthesia Reaction No family hx of         Social History:  ?  History   Smoking Status     Never Smoker   Smokeless Tobacco     Never Used     History   Drug Use No     Social History    Substance and Sexual  "Activity      Alcohol use: No      Allergies:  ?   Allergies   Allergen Reactions     Nkda [No Known Drug Allergies]      Seasonal Allergies         Medications:    Current Outpatient Medications   Medication     atorvastatin (LIPITOR) 20 MG tablet     bisacodyl (DULCOLAX) 5 MG EC tablet     calcium carbonate (TUMS) 500 MG chewable tablet     diclofenac (VOLTAREN) 50 MG EC tablet     Emollient (CETAPHIL MOISTURIZING) CREA     Multiple Vitamins-Minerals (MULTIVITAMIN ADULT PO)     Na Sulfate-K Sulfate-Mg Sulf (SUPREP BOWEL PREP KIT) solution     Omega-3 Fatty Acids (OMEGA 3 PO)     polyethylene glycol (GOLYTELY) 236 g suspension     Simethicone 125 MG TABS     Simethicone 125 MG TABS     No current facility-administered medications for this visit.         Physical Exam:  ?  Vitals:  BP (!) 148/78 (BP Location: Left arm, Patient Position: Sitting, Cuff Size: Adult Regular)   Pulse 75   Ht 1.473 m (4' 10\")   Wt 49.9 kg (110 lb)   LMP 09/09/2000 (Approximate)   SpO2 99%   BMI 22.99 kg/m     General:  WD/WN, in NAD.  A&O x3.  Dermatologic:    Skin is intact, open lesions absent.   Skin texture, turgor is normal.  Vascular:  Pulses palpable bilateral.  Digital capillary refill time normal bilateral.  Skin temperature is normal bilateral.  Generalized edema- none bilateral.  Focal edema- moderate entire ankle L vs R.   Neurologic:    Gross sensation normal.  Gait and balance normal.  Musculoskeletal:  Maximal pain to palpation of medial ankle corner & emdial gutter, right.  mild pain to palpation of deltoid, lateral ankle joint, right.  Ankle ROM limited, mildly painful, right.    Anterior drawer stable, Talar tilt grossly stable,  Right.  Muscle strength 5/5  foot & ankle bilateral.    Stance:  Tibial varum  RCSP neutral to valgus bilateral.  Clinical deformity: swollen L ankle    Imaging:   x-ray independently reviewed and interpreted by myself today.  Weight-bearing views left ankle dated 08/10/22, reveal MDTA 85 " deg on MO, medial joint space osteophytes.  Large medial talar dome OCD.  Valgus RF compensation for varus ankle present.              Again, thank you for allowing me to participate in the care of your patient.        Sincerely,        Sandy Drake DPM

## 2022-08-10 NOTE — PROGRESS NOTES
Assessment:      ICD-10-CM    1. Osteochondral defect of ankle  M95.8 XR Ankle Left G/E 3 Views     diclofenac (VOLTAREN) 50 MG EC tablet     Physical Therapy Referral     Ankle/Foot Bracing Supplies Order for DME - ONLY FOR DME   2. Arthritis of ankle, left  M19.072 XR Ankle Left G/E 3 Views     diclofenac (VOLTAREN) 50 MG EC tablet     Physical Therapy Referral     Ankle/Foot Bracing Supplies Order for DME - ONLY FOR DME   3. Acute left ankle pain  M25.572 XR Ankle Left G/E 3 Views     Orthopedic  Referral     Ankle/Foot Bracing Supplies Order for DME - ONLY FOR DME          Plan:  Orders Placed This Encounter   Procedures     XR Ankle Left G/E 3 Views     Physical Therapy Referral     Ankle/Foot Bracing Supplies Order for DME - ONLY FOR DME       Discussed the etiology and treatment of the condition with the patient.  Imaging studies reviewed and discussed with the patient.  Discussed surgical and conservative options.    Varus ankle arthritis, OCD medial talar dome  First time it has hurt patient    -Injection- offered  -NSAID- Rx po today  -Brace- ASO.  -PT- referral  -Activity- limit uneven ground    Discussed surgical Tx, OCD repair, possible malleolar osteotomy.  CT prior to this.  Only if ankle is consistently painful.    Return:  Return in about 6 weeks (around 9/21/2022), or if symptoms worsen or fail to improve.                Chief Complaint:     Patient presents with:  Left Ankle - Pain  Consult     left ankle/foot pain    HPI:  Juanita Galvan is a 66 year old year old female who presents for evaluation of ankle pain.    Pain location- around the top of the ankle cant bend  Quality- pain is described as tight (pulling).  Severity- 5/10, symptoms are unchanged.  Duration- 2 week(s).  Timing- symptom occurrence is morning.  Context- occurs while with normal daily activity.  Modifying factors- worsened by walking and improved by Ibuprofen.  Associated symptoms- no swelling.  Prior treatments-  NSAIDS.      Pt is here w/  as interpretor  Denies spraining the Ankle.  Did recently Injure R Achilles 3 yrs ago, has noticed calf atrophy since then.  Duration of pain 2 weeks.    Has not hurt before this    L ankle visibly swollen, circumferentially, more than R      Past Medical & Surgical History:  Past Medical History:   Diagnosis Date     Lipoma      Melasma      Vitreous floaters 3/5/2013      Past Surgical History:   Procedure Laterality Date     COLONOSCOPY  3/26/2012    Procedure:COLONOSCOPY; COLONOSCOPY, SCREEN; Surgeon:MICAH RICHARDSON; Location:[facility] OR     COLONOSCOPY WITH CO2 INSUFFLATION N/A 6/7/2021    Procedure: COLONOSCOPY, WITH CO2 INSUFFLATION;  Surgeon: Angela Drummond DO;  Location: [facility] OR     NASAL ENDOSCOPY N/A 9/10/2020    Procedure: Removal of Nasal Implant, Rigid Nasal Endoscopy;  Surgeon: Yanci Perry MD;  Location: [facility]     NOSE SURGERY  2011    dorsal splint      Family History   Problem Relation Age of Onset     No Known Problems Mother      No Known Problems Father      No Known Problems Brother      No Known Problems Sister      No Known Problems Sister      No Known Problems Sister      Cancer No family hx of      Diabetes No family hx of      Hypertension No family hx of      Cerebrovascular Disease No family hx of      Thyroid Disease No family hx of      Glaucoma No family hx of      Macular Degeneration No family hx of      Deep Vein Thrombosis No family hx of      Pulmonary Embolism No family hx of      Anesthesia Reaction No family hx of         Social History:  ?  History   Smoking Status     Never Smoker   Smokeless Tobacco     Never Used     History   Drug Use No     Social History    Substance and Sexual Activity      Alcohol use: No      Allergies:  ?   Allergies   Allergen Reactions     Nkda [No Known Drug Allergies]      Seasonal Allergies         Medications:    Current Outpatient Medications   Medication     atorvastatin (LIPITOR) 20 MG tablet      "bisacodyl (DULCOLAX) 5 MG EC tablet     calcium carbonate (TUMS) 500 MG chewable tablet     diclofenac (VOLTAREN) 50 MG EC tablet     Emollient (CETAPHIL MOISTURIZING) CREA     Multiple Vitamins-Minerals (MULTIVITAMIN ADULT PO)     Na Sulfate-K Sulfate-Mg Sulf (SUPREP BOWEL PREP KIT) solution     Omega-3 Fatty Acids (OMEGA 3 PO)     polyethylene glycol (GOLYTELY) 236 g suspension     Simethicone 125 MG TABS     Simethicone 125 MG TABS     No current facility-administered medications for this visit.         Physical Exam:  ?  Vitals:  BP (!) 148/78 (BP Location: Left arm, Patient Position: Sitting, Cuff Size: Adult Regular)   Pulse 75   Ht 1.473 m (4' 10\")   Wt 49.9 kg (110 lb)   LMP 09/09/2000 (Approximate)   SpO2 99%   BMI 22.99 kg/m     General:  WD/WN, in NAD.  A&O x3.  Dermatologic:    Skin is intact, open lesions absent.   Skin texture, turgor is normal.  Vascular:  Pulses palpable bilateral.  Digital capillary refill time normal bilateral.  Skin temperature is normal bilateral.  Generalized edema- none bilateral.  Focal edema- moderate entire ankle L vs R.   Neurologic:    Gross sensation normal.  Gait and balance normal.  Musculoskeletal:  Maximal pain to palpation of medial ankle corner & emdial gutter, right.  mild pain to palpation of deltoid, lateral ankle joint, right.  Ankle ROM limited, mildly painful, right.    Anterior drawer stable, Talar tilt grossly stable,  Right.  Muscle strength 5/5  foot & ankle bilateral.    Stance:  Tibial varum  RCSP neutral to valgus bilateral.  Clinical deformity: swollen L ankle    Imaging:   x-ray independently reviewed and interpreted by myself today.  Weight-bearing views left ankle dated 08/10/22, reveal MDTA 85 deg on MO, medial joint space osteophytes.  Large medial talar dome OCD.  Valgus RF compensation for varus ankle present.          "

## 2022-08-12 ENCOUNTER — ANCILLARY PROCEDURE (OUTPATIENT)
Dept: MAMMOGRAPHY | Facility: CLINIC | Age: 67
End: 2022-08-12
Attending: PREVENTIVE MEDICINE
Payer: COMMERCIAL

## 2022-08-12 DIAGNOSIS — Z12.31 VISIT FOR SCREENING MAMMOGRAM: ICD-10-CM

## 2022-08-12 PROCEDURE — 77067 SCR MAMMO BI INCL CAD: CPT | Mod: TC | Performed by: RADIOLOGY

## 2022-11-01 ENCOUNTER — OFFICE VISIT (OUTPATIENT)
Dept: FAMILY MEDICINE | Facility: CLINIC | Age: 67
End: 2022-11-01
Payer: COMMERCIAL

## 2022-11-01 VITALS
TEMPERATURE: 97.3 F | OXYGEN SATURATION: 100 % | BODY MASS INDEX: 22.34 KG/M2 | DIASTOLIC BLOOD PRESSURE: 59 MMHG | HEART RATE: 69 BPM | SYSTOLIC BLOOD PRESSURE: 120 MMHG | HEIGHT: 59 IN | WEIGHT: 110.8 LBS | RESPIRATION RATE: 21 BRPM

## 2022-11-01 DIAGNOSIS — K21.9 LPRD (LARYNGOPHARYNGEAL REFLUX DISEASE): ICD-10-CM

## 2022-11-01 DIAGNOSIS — E78.2 MIXED HYPERLIPIDEMIA: ICD-10-CM

## 2022-11-01 DIAGNOSIS — Z28.21 INFLUENZA VACCINE REFUSED: ICD-10-CM

## 2022-11-01 DIAGNOSIS — Z00.00 ROUTINE GENERAL MEDICAL EXAMINATION AT A HEALTH CARE FACILITY: Primary | ICD-10-CM

## 2022-11-01 DIAGNOSIS — Z23 ENCOUNTER FOR IMMUNIZATION: ICD-10-CM

## 2022-11-01 LAB
CHOLEST SERPL-MCNC: 172 MG/DL
FASTING STATUS PATIENT QL REPORTED: YES
FASTING STATUS PATIENT QL REPORTED: YES
GLUCOSE BLD-MCNC: 102 MG/DL (ref 70–99)
HDLC SERPL-MCNC: 59 MG/DL
LDLC SERPL CALC-MCNC: 87 MG/DL
NONHDLC SERPL-MCNC: 113 MG/DL
TRIGL SERPL-MCNC: 128 MG/DL

## 2022-11-01 PROCEDURE — 80061 LIPID PANEL: CPT | Performed by: PREVENTIVE MEDICINE

## 2022-11-01 PROCEDURE — 99213 OFFICE O/P EST LOW 20 MIN: CPT | Mod: 25 | Performed by: PREVENTIVE MEDICINE

## 2022-11-01 PROCEDURE — 36415 COLL VENOUS BLD VENIPUNCTURE: CPT | Performed by: PREVENTIVE MEDICINE

## 2022-11-01 PROCEDURE — 99396 PREV VISIT EST AGE 40-64: CPT | Mod: 25 | Performed by: PREVENTIVE MEDICINE

## 2022-11-01 PROCEDURE — 90471 IMMUNIZATION ADMIN: CPT | Performed by: PREVENTIVE MEDICINE

## 2022-11-01 PROCEDURE — 90714 TD VACC NO PRESV 7 YRS+ IM: CPT | Performed by: PREVENTIVE MEDICINE

## 2022-11-01 PROCEDURE — 82947 ASSAY GLUCOSE BLOOD QUANT: CPT | Performed by: PREVENTIVE MEDICINE

## 2022-11-01 RX ORDER — ATORVASTATIN CALCIUM 20 MG/1
TABLET, FILM COATED ORAL
Qty: 90 TABLET | Refills: 3 | Status: SHIPPED | OUTPATIENT
Start: 2022-11-01 | End: 2023-10-30

## 2022-11-01 ASSESSMENT — ENCOUNTER SYMPTOMS
SORE THROAT: 0
PALPITATIONS: 0
HEMATOCHEZIA: 0
MYALGIAS: 0
FEVER: 0
DYSURIA: 0
WEAKNESS: 0
HEARTBURN: 0
DIARRHEA: 0
SHORTNESS OF BREATH: 0
HEMATURIA: 0
JOINT SWELLING: 0
NAUSEA: 0
HEADACHES: 0
FREQUENCY: 0
CONSTIPATION: 0
COUGH: 0
ABDOMINAL PAIN: 0
NERVOUS/ANXIOUS: 0
EYE PAIN: 0
ARTHRALGIAS: 0
DIZZINESS: 0
PARESTHESIAS: 0
CHILLS: 0

## 2022-11-01 ASSESSMENT — PAIN SCALES - GENERAL: PAINLEVEL: NO PAIN (0)

## 2022-11-01 NOTE — LETTER
November 1, 2022      Juanita Galvan  [address]  [place] [address]        Dear Ms.Mandy,    We are writing to inform you of your test results.    Cholesterol is at goal for you.   Glucose is not showing diabetes.   Please let me know if you have any questions and thank you for choosing [facility].     Resulted Orders   Glucose   Result Value Ref Range    Glucose 102 (H) 70 - 99 mg/dL    Patient Fasting > 8hrs? Yes    Lipid panel reflex to direct LDL Non-fasting   Result Value Ref Range    Cholesterol 172 <200 mg/dL    Triglycerides 128 <150 mg/dL    Direct Measure HDL 59 >=50 mg/dL    LDL Cholesterol Calculated 87 <=100 mg/dL    Non HDL Cholesterol 113 <130 mg/dL    Patient Fasting > 8hrs? Yes     Narrative    Cholesterol  Desirable:  <200 mg/dL    Triglycerides  Normal:  Less than 150 mg/dL  Borderline High:  150-199 mg/dL  High:  200-499 mg/dL  Very High:  Greater than or equal to 500 mg/dL    Direct Measure HDL  Female:  Greater than or equal to 50 mg/dL   Male:  Greater than or equal to 40 mg/dL    LDL Cholesterol  Desirable:  <100mg/dL  Above Desirable:  100-129 mg/dL   Borderline High:  130-159 mg/dL   High:  160-189 mg/dL   Very High:  >= 190 mg/dL    Non HDL Cholesterol  Desirable:  130 mg/dL  Above Desirable:  130-159 mg/dL  Borderline High:  160-189 mg/dL  High:  190-219 mg/dL  Very High:  Greater than or equal to 220 mg/dL       If you have any questions or concerns, please call the clinic at the number listed above.       Sincerely,      Milagro Acosta MD

## 2022-11-01 NOTE — NURSING NOTE
Prior to immunization administration, verified patients identity using patient s name and date of birth. Please see Immunization Activity for additional information.     Screening Questionnaire for Adult Immunization    Are you sick today?   No   Do you have allergies to medications, food, a vaccine component or latex?   No   Have you ever had a serious reaction after receiving a vaccination?   No   Do you have a long-term health problem with heart, lung, kidney, or metabolic disease (e.g., diabetes), asthma, a blood disorder, no spleen, complement component deficiency, a cochlear implant, or a spinal fluid leak?  Are you on long-term aspirin therapy?   No   Do you have cancer, leukemia, HIV/AIDS, or any other immune system problem?   No   Do you have a parent, brother, or sister with an immune system problem?   No   In the past 3 months, have you taken medications that affect  your immune system, such as prednisone, other steroids, or anticancer drugs; drugs for the treatment of rheumatoid arthritis, Crohn s disease, or psoriasis; or have you had radiation treatments?   No   Have you had a seizure, or a brain or other nervous system problem?   No   During the past year, have you received a transfusion of blood or blood    products, or been given immune (gamma) globulin or antiviral drug?   No   For women: Are you pregnant or is there a chance you could become       pregnant during the next month?   No   Have you received any vaccinations in the past 4 weeks?   No     Immunization questionnaire answers were all negative.        Per orders of Dr. Acosta, injection of TD given by Lani Reyes MA. Patient instructed to remain in clinic for 15 minutes afterwards, and to report any adverse reaction to me immediately.       Screening performed by Lani Reyes MA on 11/1/2022 at 9:58 AM.

## 2022-11-01 NOTE — RESULT ENCOUNTER NOTE
Please send a letter:    Dear Juanita Galvan,    Cholesterol is at goal for you.  Glucose is not showing diabetes.  Please let me know if you have any questions and thank you for choosing [facility].      Regards,    Milagro Acosta MD MPH

## 2022-11-01 NOTE — PROGRESS NOTES
SUBJECTIVE:   CC: Juanita Johns is an 66 year old who presents for preventive health visit.       Patient has been advised of split billing requirements and indicates understanding: Yes  Healthy Habits:     Getting at least 3 servings of Calcium per day:  Yes    Bi-annual eye exam:  Yes    Dental care twice a year:  Yes    Sleep apnea or symptoms of sleep apnea:  None    Diet:  Regular (no restrictions)    Frequency of exercise:  2-3 days/week    Duration of exercise:  Less than 15 minutes    Taking medications regularly:  Yes    Medication side effects:  None    PHQ-2 Total Score: 0    Additional concerns today:  No    Mammogram done 8/12/22  Colonoscopy done 6/7/21  Pap smear 8/13/19    Vaccines due: Covid, Td, Flu     Wt Readings from Last 2 Encounters:   11/01/22 50.3 kg (110 lb 12.8 oz)   08/10/22 49.9 kg (110 lb)     Throat pain:  -2 weeks  -intermittent  -no post nasal drainage  -no pressure  -no pain  -no ear pain  -needs to clear throat, feels irritation in the throat, like need to clear the throat   -no dysphagia  -no odynophagia  -no emesis  -no rectal bleeding  -no medication   -not increasing   -no alcohol or tobacco use  -one cup of coffee per day     Hyperlipidemia Follow-Up      Are you regularly taking any medication or supplement to lower your cholesterol?   Yes- statin    Are you having muscle aches or other side effects that you think could be caused by your cholesterol lowering medication?  No      Today's PHQ-2 Score:   PHQ-2 ( 1999 Pfizer) 11/1/2022   Q1: Little interest or pleasure in doing things 0   Q2: Feeling down, depressed or hopeless 0   PHQ-2 Score 0   PHQ-2 Total Score (12-17 Years)- Positive if 3 or more points; Administer PHQ-A if positive -   Q1: Little interest or pleasure in doing things Not at all   Q2: Feeling down, depressed or hopeless Not at all   PHQ-2 Score 0       Abuse: Current or Past (Physical, Sexual or Emotional) - No  Do you feel safe in your environment?  Yes        Social History     Tobacco Use     Smoking status: Never     Smokeless tobacco: Never   Substance Use Topics     Alcohol use: No     If you drink alcohol do you typically have >3 drinks per day or >7 drinks per week? Not applicable    Alcohol Use 11/1/2022   Prescreen: >3 drinks/day or >7 drinks/week? No   Prescreen: >3 drinks/day or >7 drinks/week? -       Reviewed orders with patient.  Reviewed health maintenance and updated orders accordingly - Yes  Lab work is in process  Labs reviewed in EPIC  BP Readings from Last 3 Encounters:   11/01/22 120/59   08/10/22 (!) 148/78   08/02/22 (!) 152/75    Wt Readings from Last 3 Encounters:   11/01/22 50.3 kg (110 lb 12.8 oz)   08/10/22 49.9 kg (110 lb)   08/02/22 50 kg (110 lb 3.2 oz)                  Patient Active Problem List   Diagnosis     CARDIOVASCULAR SCREENING; LDL GOAL LESS THAN 160     Vitreous floaters     Advanced directives, counseling/discussion     Right foot pain     Screening for cervical cancer     Infection associated with implant (H)     History of rhinoplasty     Infection of implant site (H)     Past Surgical History:   Procedure Laterality Date     COLONOSCOPY  3/26/2012    Procedure:COLONOSCOPY; COLONOSCOPY, SCREEN; Surgeon:MICAH RICHARDSON; Location:[facility] OR     COLONOSCOPY WITH CO2 INSUFFLATION N/A 6/7/2021    Procedure: COLONOSCOPY, WITH CO2 INSUFFLATION;  Surgeon: Angela Drummond DO;  Location: [facility] OR     NASAL ENDOSCOPY N/A 9/10/2020    Procedure: Removal of Nasal Implant, Rigid Nasal Endoscopy;  Surgeon: Yanci Perry MD;  Location: [facility] OR     NOSE SURGERY  2011    dorsal splint       Social History     Tobacco Use     Smoking status: Never     Smokeless tobacco: Never   Substance Use Topics     Alcohol use: No     Family History   Problem Relation Age of Onset     No Known Problems Mother      No Known Problems Father      No Known Problems Brother      No Known Problems Sister      No Known Problems Sister      No Known  Problems Sister      Cancer No family hx of      Diabetes No family hx of      Hypertension No family hx of      Cerebrovascular Disease No family hx of      Thyroid Disease No family hx of      Glaucoma No family hx of      Macular Degeneration No family hx of      Deep Vein Thrombosis No family hx of      Pulmonary Embolism No family hx of      Anesthesia Reaction No family hx of          Current Outpatient Medications   Medication Sig Dispense Refill     atorvastatin (LIPITOR) 20 MG tablet TAKE 1 TABLET(20 MG) BY MOUTH DAILY FOR CHOLESTEROL Strength: 20 mg 90 tablet 3     calcium carbonate (TUMS) 500 MG chewable tablet Take 1 chew tab by mouth 2 times daily Pt takes as needed       Multiple Vitamins-Minerals (MULTIVITAMIN ADULT PO) Take 1 tablet by mouth every morning       Omega-3 Fatty Acids (OMEGA 3 PO)        omeprazole (PRILOSEC) 20 MG DR capsule Take 1 capsule (20 mg) by mouth daily 30 capsule 0     diclofenac (VOLTAREN) 50 MG EC tablet Take 1 tablet (50 mg) by mouth 2 times daily for 7 days 28 tablet 1     Allergies   Allergen Reactions     Nkda [No Known Drug Allergies]      Seasonal Allergies        Breast Cancer Screening:    FHS-7:   Breast CA Risk Assessment (FHS-7) 7/16/2021 8/12/2022   Did any of your first-degree relatives have breast or ovarian cancer? No No   Did any of your relatives have bilateral breast cancer? No No   Did any man in your family have breast cancer? No No   Did any woman in your family have breast and ovarian cancer? No No   Did any woman in your family have breast cancer before age 50 y? No No   Do you have 2 or more relatives with breast and/or ovarian cancer? No No   Do you have 2 or more relatives with breast and/or bowel cancer? No No       Mammogram Screening: Recommended mammography every 1-2 years with patient discussion and risk factor consideration  Pertinent mammograms are reviewed under the imaging tab.    History of abnormal Pap smear: NO - age 30-65 PAP every 5  years with negative HPV co-testing recommended  PAP / HPV Latest Ref Rng & Units 8/13/2019 5/27/2016 4/5/2013   PAP (Historical) - NIL NIL NIL   HPV16 NEG:Negative Negative - -   HPV18 NEG:Negative Negative - -   HRHPV NEG:Negative Negative - -     Reviewed and updated as needed this visit by clinical staff   Tobacco  Allergies  Meds  Problems  Med Hx  Surg Hx  Fam Hx  Soc   Hx        Reviewed and updated as needed this visit by Provider   Tobacco  Allergies  Meds  Problems  Med Hx  Surg Hx  Fam Hx         Past Medical History:   Diagnosis Date     Lipoma      Melasma      Vitreous floaters 3/5/2013      Past Surgical History:   Procedure Laterality Date     COLONOSCOPY  3/26/2012    Procedure:COLONOSCOPY; COLONOSCOPY, SCREEN; Surgeon:MICAH RICHARDSON; Location:[facility]     COLONOSCOPY WITH CO2 INSUFFLATION N/A 6/7/2021    Procedure: COLONOSCOPY, WITH CO2 INSUFFLATION;  Surgeon: Angela Drummond DO;  Location: [facility]     NASAL ENDOSCOPY N/A 9/10/2020    Procedure: Removal of Nasal Implant, Rigid Nasal Endoscopy;  Surgeon: Yanci Perry MD;  Location: [facility]     NOSE SURGERY  2011    dorsal splint       Review of Systems   Constitutional: Negative for chills and fever.   HENT: Negative for congestion, ear pain, hearing loss and sore throat.    Eyes: Negative for pain and visual disturbance.   Respiratory: Negative for cough and shortness of breath.    Cardiovascular: Negative for chest pain, palpitations and peripheral edema.   Gastrointestinal: Negative for abdominal pain, constipation, diarrhea, heartburn, hematochezia and nausea.   Genitourinary: Negative for dysuria, frequency, genital sores, hematuria and urgency.   Musculoskeletal: Negative for arthralgias, joint swelling and myalgias.   Skin: Negative for rash.   Neurological: Negative for dizziness, weakness, headaches and paresthesias.   Psychiatric/Behavioral: Negative for mood changes. The patient is not nervous/anxious.        "  OBJECTIVE:   /59 (BP Location: Left arm, Patient Position: Sitting, Cuff Size: Adult Regular)   Pulse 69   Temp 97.3  F (36.3  C) (Oral)   Resp 21   Ht 1.5 m (4' 11.06\")   Wt 50.3 kg (110 lb 12.8 oz)   LMP 09/09/2000 (Approximate)   SpO2 100%   BMI 22.34 kg/m    Physical Exam  GENERAL APPEARANCE: healthy, alert and no distress  EYES: Eyes grossly normal to inspection and conjunctivae and sclerae normal  HENT: nose and mouth without ulcers or lesions, no pharyngeal exudates or pus points, no uvular deviation, Intact TMs with no erythema   NECK: no adenopathy and trachea midline and normal to palpation  RESP: lungs clear to auscultation - no rales, rhonchi or wheezes  CV: regular rates and rhythm, normal S1 S2, no S3 or S4 and no murmur, click or rub  ABDOMEN: soft, non-tender and no rebound or guarding   MS: extremities normal- no gross deformities noted and peripheral pulses normal  SKIN: no suspicious lesions or rashes  NEURO: Normal strength and tone, mentation intact and speech normal  PSYCH: mentation appears normal      Diagnostic Test Results:  Labs reviewed in Epic  No results found for this or any previous visit (from the past 24 hour(s)).    ASSESSMENT/PLAN:   Juanita Johns was seen today for physical and throat problem.    Diagnoses and all orders for this visit:    Routine general medical examination at a health care facility  -     REVIEW OF HEALTH MAINTENANCE PROTOCOL ORDERS  -     Lipid panel reflex to direct LDL Non-fasting; Future  -     Glucose; Future    Mixed hyperlipidemia  -     atorvastatin (LIPITOR) 20 MG tablet; TAKE 1 TABLET(20 MG) BY MOUTH DAILY FOR CHOLESTEROL Strength: 20 mg  -refills on medication provided     LPRD (laryngopharyngeal reflux disease)  -     omeprazole (PRILOSEC) 20 MG DR capsule; Take 1 capsule (20 mg) by mouth daily  -information materials provided  -if not better in 4 weeks, then refer to ENT     Influenza vaccine refused  -declined    Encounter for " "immunization  -     TD PRESERV FREE, IM (7+ YRS) (DECAVAC/TENIVAC)          COUNSELING:  Reviewed preventive health counseling, as reflected in patient instructions       Regular exercise       Healthy diet/nutrition       Vision screening       Immunizations    Vaccinated for: Td          Estimated body mass index is 22.34 kg/m  as calculated from the following:    Height as of this encounter: 1.5 m (4' 11.06\").    Weight as of this encounter: 50.3 kg (110 lb 12.8 oz).    Weight management plan noted, stable and monitoring    She reports that she has never smoked. She has never used smokeless tobacco.      Counseling Resources:  ATP IV Guidelines  Pooled Cohorts Equation Calculator  Breast Cancer Risk Calculator  BRCA-Related Cancer Risk Assessment: FHS-7 Tool  FRAX Risk Assessment  ICSI Preventive Guidelines  Dietary Guidelines for Americans, 2010  USDA's MyPlate  ASA Prophylaxis  Lung CA Screening    Milagro Acosta MD MPH    [facility]  "

## 2022-12-15 ENCOUNTER — OFFICE VISIT (OUTPATIENT)
Dept: URGENT CARE | Facility: URGENT CARE | Age: 67
End: 2022-12-15
Payer: COMMERCIAL

## 2022-12-15 VITALS
HEIGHT: 58 IN | DIASTOLIC BLOOD PRESSURE: 82 MMHG | TEMPERATURE: 99.3 F | OXYGEN SATURATION: 97 % | BODY MASS INDEX: 23.55 KG/M2 | WEIGHT: 112.2 LBS | SYSTOLIC BLOOD PRESSURE: 133 MMHG | HEART RATE: 91 BPM

## 2022-12-15 DIAGNOSIS — Z20.822 SUSPECTED 2019 NOVEL CORONAVIRUS INFECTION: Primary | ICD-10-CM

## 2022-12-15 DIAGNOSIS — J22 LOWER RESP. TRACT INFECTION: ICD-10-CM

## 2022-12-15 LAB
DEPRECATED S PYO AG THROAT QL EIA: NEGATIVE
FLUAV AG SPEC QL IA: NEGATIVE
FLUBV AG SPEC QL IA: NEGATIVE
GROUP A STREP BY PCR: NOT DETECTED
SARS-COV-2 RNA RESP QL NAA+PROBE: POSITIVE

## 2022-12-15 PROCEDURE — U0005 INFEC AGEN DETEC AMPLI PROBE: HCPCS | Performed by: PHYSICIAN ASSISTANT

## 2022-12-15 PROCEDURE — U0003 INFECTIOUS AGENT DETECTION BY NUCLEIC ACID (DNA OR RNA); SEVERE ACUTE RESPIRATORY SYNDROME CORONAVIRUS 2 (SARS-COV-2) (CORONAVIRUS DISEASE [COVID-19]), AMPLIFIED PROBE TECHNIQUE, MAKING USE OF HIGH THROUGHPUT TECHNOLOGIES AS DESCRIBED BY CMS-2020-01-R: HCPCS | Performed by: PHYSICIAN ASSISTANT

## 2022-12-15 PROCEDURE — 87651 STREP A DNA AMP PROBE: CPT | Performed by: PHYSICIAN ASSISTANT

## 2022-12-15 PROCEDURE — 87804 INFLUENZA ASSAY W/OPTIC: CPT | Performed by: PHYSICIAN ASSISTANT

## 2022-12-15 PROCEDURE — 99214 OFFICE O/P EST MOD 30 MIN: CPT | Mod: CS | Performed by: PHYSICIAN ASSISTANT

## 2022-12-15 RX ORDER — BENZONATATE 100 MG/1
100 CAPSULE ORAL 3 TIMES DAILY PRN
Qty: 30 CAPSULE | Refills: 0 | Status: SHIPPED | OUTPATIENT
Start: 2022-12-15 | End: 2023-12-12

## 2022-12-15 RX ORDER — AZITHROMYCIN 250 MG/1
TABLET, FILM COATED ORAL
Qty: 6 TABLET | Refills: 0 | Status: SHIPPED | OUTPATIENT
Start: 2022-12-15 | End: 2022-12-20

## 2022-12-15 ASSESSMENT — ENCOUNTER SYMPTOMS
RHINORRHEA: 0
VOMITING: 0
EYES NEGATIVE: 1
FEVER: 0
WOUND: 0
ARTHRALGIAS: 0
SORE THROAT: 0
JOINT SWELLING: 0
NECK PAIN: 0
NECK STIFFNESS: 0
ENDOCRINE NEGATIVE: 1
CARDIOVASCULAR NEGATIVE: 1
DIZZINESS: 0
CHILLS: 0
WEAKNESS: 0
MYALGIAS: 0
PALPITATIONS: 0
DIARRHEA: 0
LIGHT-HEADEDNESS: 0
HEADACHES: 0
NAUSEA: 0
BACK PAIN: 0
MUSCULOSKELETAL NEGATIVE: 1
COUGH: 1
ALLERGIC/IMMUNOLOGIC NEGATIVE: 1
SHORTNESS OF BREATH: 0

## 2022-12-15 NOTE — PROGRESS NOTES
"Chief Complaint:     Chief Complaint   Patient presents with     Cough     Wet cough since Monday. Cough is causing her some discomfort in her chest.      Nausea       Results for orders placed or performed in visit on 12/15/22   Influenza A & B Antigen - Clinic Collect     Status: Normal    Specimen: Nose; Swab   Result Value Ref Range    Influenza A antigen Negative Negative    Influenza B antigen Negative Negative    Narrative    Test results must be correlated with clinical data. If necessary, results should be confirmed by a molecular assay or viral culture.   Streptococcus A Rapid Screen w/Reflex to PCR - Clinic Collect     Status: Normal    Specimen: Throat; Swab   Result Value Ref Range    Group A Strep antigen Negative Negative       Medical Decision Making:    Vital signs reviewed by Adolfo De La Paz PA-C  /82 (BP Location: Left arm, Patient Position: Sitting, Cuff Size: Adult Regular)   Pulse 91   Temp 99.3  F (37.4  C) (Tympanic)   Ht 1.473 m (4' 10\")   Wt 50.9 kg (112 lb 3.2 oz)   LMP 09/09/2000 (Approximate)   SpO2 97%   BMI 23.45 kg/m      Differential Diagnosis:  URI Adult/Peds:  Bronchitis-viral, Influenza, Pneumonia, Viral syndrome and Viral upper respiratory illness        ASSESSMENT    1. Suspected 2019 novel coronavirus infection    2. Lower resp. tract infection        PLAN    Patient is in no acute distress.    Temp is 99.3 in clinic today, lung sounds were clear, and O2 sats at 97% on RA.    RST was negative.  We will call with PCR results only if positive.  Influenza was negative.    COVID swab collected in clinic today.  With symptoms, Rx for Zithromax per patient request.  Rest, Push fluids, vaporizer, elevation of head of bed.  Ibuprofen and or Tylenol for any fever or body aches.  Rx for Tessalon cough suppressant- PRN- as discussed.   If symptoms worsen, recheck immediately otherwise follow up with your PCP in 1 week if symptoms are not improving.  Worrisome symptoms " "discussed with instructions to go to the ED.  Patient verbalized understanding and agreed with this plan.    Labs:    Results for orders placed or performed in visit on 12/15/22   Influenza A & B Antigen - Clinic Collect     Status: Normal    Specimen: Nose; Swab   Result Value Ref Range    Influenza A antigen Negative Negative    Influenza B antigen Negative Negative    Narrative    Test results must be correlated with clinical data. If necessary, results should be confirmed by a molecular assay or viral culture.   Streptococcus A Rapid Screen w/Reflex to PCR - Clinic Collect     Status: Normal    Specimen: Throat; Swab   Result Value Ref Range    Group A Strep antigen Negative Negative        Vital signs reviewed by Adolfo De La Paz PA-C  /82 (BP Location: Left arm, Patient Position: Sitting, Cuff Size: Adult Regular)   Pulse 91   Temp 99.3  F (37.4  C) (Tympanic)   Ht 1.473 m (4' 10\")   Wt 50.9 kg (112 lb 3.2 oz)   LMP 09/09/2000 (Approximate)   SpO2 97%   BMI 23.45 kg/m      Current Meds      Current Outpatient Medications:      atorvastatin (LIPITOR) 20 MG tablet, TAKE 1 TABLET(20 MG) BY MOUTH DAILY FOR CHOLESTEROL Strength: 20 mg, Disp: 90 tablet, Rfl: 3     azithromycin (ZITHROMAX) 250 MG tablet, Take 2 tablets (500 mg) by mouth daily for 1 day, THEN 1 tablet (250 mg) daily for 4 days., Disp: 6 tablet, Rfl: 0     benzonatate (TESSALON) 100 MG capsule, Take 1 capsule (100 mg) by mouth 3 times daily as needed for cough, Disp: 30 capsule, Rfl: 0     calcium carbonate (TUMS) 500 MG chewable tablet, Take 1 chew tab by mouth 2 times daily Pt takes as needed, Disp: , Rfl:      Multiple Vitamins-Minerals (MULTIVITAMIN ADULT PO), Take 1 tablet by mouth every morning, Disp: , Rfl:      Omega-3 Fatty Acids (OMEGA 3 PO), , Disp: , Rfl:      omeprazole (PRILOSEC) 20 MG DR capsule, TAKE 1 CAPSULE(20 MG) BY MOUTH DAILY, Disp: 30 capsule, Rfl: 0     diclofenac (VOLTAREN) 50 MG EC tablet, Take 1 tablet (50 mg) by " mouth 2 times daily for 7 days, Disp: 28 tablet, Rfl: 1      Respiratory History    occasional episodes of bronchitis      SUBJECTIVE    HPI: Juanita Galvan is an 67 year old female who presents with chest congestion, cough nonproductive, occasional and nausea.  Symptoms began 3  days ago and has unchanged.  There is no shortness of breath, wheezing and chest pain.  Patient is eating and drinking well.  No fever, nausea, vomiting, or diarrhea.    Patient denies any recent travel or exposure to known COVID positive tested individual.      ROS:     Review of Systems   Constitutional: Negative for chills and fever.   HENT: Positive for congestion. Negative for ear pain, rhinorrhea and sore throat.    Eyes: Negative.    Respiratory: Positive for cough. Negative for shortness of breath.    Cardiovascular: Negative.  Negative for chest pain and palpitations.   Gastrointestinal: Negative for diarrhea, nausea and vomiting.   Endocrine: Negative.    Genitourinary: Negative.    Musculoskeletal: Negative.  Negative for arthralgias, back pain, joint swelling, myalgias, neck pain and neck stiffness.   Skin: Negative.  Negative for rash and wound.   Allergic/Immunologic: Negative.  Negative for immunocompromised state.   Neurological: Negative for dizziness, weakness, light-headedness and headaches.         Family History   Family History   Problem Relation Age of Onset     No Known Problems Mother      No Known Problems Father      No Known Problems Brother      No Known Problems Sister      No Known Problems Sister      No Known Problems Sister      Cancer No family hx of      Diabetes No family hx of      Hypertension No family hx of      Cerebrovascular Disease No family hx of      Thyroid Disease No family hx of      Glaucoma No family hx of      Macular Degeneration No family hx of      Deep Vein Thrombosis No family hx of      Pulmonary Embolism No family hx of      Anesthesia Reaction No family hx of         Problem  "history  Patient Active Problem List   Diagnosis     CARDIOVASCULAR SCREENING; LDL GOAL LESS THAN 160     Vitreous floaters     Advanced directives, counseling/discussion     Right foot pain     Screening for cervical cancer     Infection associated with implant (H)     History of rhinoplasty     Infection of implant site (H)        Allergies  Allergies   Allergen Reactions     Nkda [No Known Drug Allergies]      Seasonal Allergies         Social History  Social History     Socioeconomic History     Marital status:      Spouse name: Not on file     Number of children: 3     Years of education: Not on file     Highest education level: Not on file   Occupational History     Occupation: visual technician   Tobacco Use     Smoking status: Never     Smokeless tobacco: Never   Vaping Use     Vaping Use: Never used   Substance and Sexual Activity     Alcohol use: No     Drug use: No     Sexual activity: Not Currently     Partners: Male   Other Topics Concern     Parent/sibling w/ CABG, MI or angioplasty before 65F 55M? No   Social History Narrative     Not on file     Social Determinants of Health     Financial Resource Strain: Not on file   Food Insecurity: Not on file   Transportation Needs: Not on file   Physical Activity: Not on file   Stress: Not on file   Social Connections: Not on file   Intimate Partner Violence: Not on file   Housing Stability: Not on file        OBJECTIVE     Vital signs reviewed by Adolfo De La Paz PA-C  /82 (BP Location: Left arm, Patient Position: Sitting, Cuff Size: Adult Regular)   Pulse 91   Temp 99.3  F (37.4  C) (Tympanic)   Ht 1.473 m (4' 10\")   Wt 50.9 kg (112 lb 3.2 oz)   LMP 09/09/2000 (Approximate)   SpO2 97%   BMI 23.45 kg/m       Physical Exam  Vitals and nursing note reviewed.   Constitutional:       General: She is not in acute distress.     Appearance: She is well-developed. She is not ill-appearing, toxic-appearing or diaphoretic.   HENT:      Head: " Normocephalic and atraumatic.      Right Ear: Hearing, tympanic membrane, ear canal and external ear normal. Tympanic membrane is not perforated, erythematous, retracted or bulging.      Left Ear: Hearing, tympanic membrane, ear canal and external ear normal. Tympanic membrane is not perforated, erythematous, retracted or bulging.      Nose: Congestion present. No mucosal edema or rhinorrhea.      Right Sinus: No maxillary sinus tenderness or frontal sinus tenderness.      Left Sinus: No maxillary sinus tenderness or frontal sinus tenderness.      Mouth/Throat:      Pharynx: No pharyngeal swelling, oropharyngeal exudate, posterior oropharyngeal erythema or uvula swelling.      Tonsils: No tonsillar exudate or tonsillar abscesses. 0 on the right. 0 on the left.   Eyes:      General:         Right eye: No discharge.         Left eye: No discharge.      Pupils: Pupils are equal, round, and reactive to light.   Cardiovascular:      Rate and Rhythm: Normal rate and regular rhythm.      Heart sounds: Normal heart sounds. No murmur heard.    No friction rub. No gallop.   Pulmonary:      Effort: Pulmonary effort is normal. No respiratory distress.      Breath sounds: Normal breath sounds. No decreased breath sounds, wheezing, rhonchi or rales.   Chest:      Chest wall: No tenderness.   Abdominal:      General: Bowel sounds are normal. There is no distension.      Palpations: Abdomen is soft. There is no mass.      Tenderness: There is no abdominal tenderness. There is no guarding.   Musculoskeletal:      Cervical back: Normal range of motion and neck supple.   Lymphadenopathy:      Head:      Right side of head: No submental, submandibular, tonsillar, preauricular or posterior auricular adenopathy.      Left side of head: No submental, submandibular, tonsillar, preauricular or posterior auricular adenopathy.      Cervical: No cervical adenopathy.      Right cervical: No superficial or posterior cervical adenopathy.     Left  cervical: No superficial or posterior cervical adenopathy.   Skin:     General: Skin is warm and dry.      Findings: No rash.   Neurological:      Mental Status: She is alert and oriented to person, place, and time.      Cranial Nerves: No cranial nerve deficit.      Deep Tendon Reflexes: Reflexes are normal and symmetric.   Psychiatric:         Behavior: Behavior normal. Behavior is cooperative.         Thought Content: Thought content normal.         Judgment: Judgment normal.           Adolfo De La Paz PA-C  12/15/2022, 9:53 AM

## 2022-12-16 ENCOUNTER — TELEPHONE (OUTPATIENT)
Dept: NURSING | Facility: CLINIC | Age: 67
End: 2022-12-16

## 2022-12-16 NOTE — TELEPHONE ENCOUNTER
Patient classified as COVID treatment eligible by Epic high risk algorithm:  Yes    Coronavirus (COVID-19) Notification    Reason for call  Notify of POSITIVE COVID-19 lab result, assess symptoms,  review [facility] recommendations    Lab Result   Lab test for 2019-nCoV rRt-PCR or SARS-COV-2 PCR  Oropharyngeal AND/OR nasopharyngeal swabs were POSITIVE for 2019-nCoV RNA [OR] SARS-COV-2 RNA (COVID-19) RNA     We have been unable to reach patient by phone at this time to notify of their Positive COVID-19 result.    Left voicemail message requesting a call back to [phone] [facility] for results.        A Positive COVID-19 letter will be sent via [employer] or the mail.    Huong

## 2023-05-06 ENCOUNTER — NURSE TRIAGE (OUTPATIENT)
Dept: NURSING | Facility: CLINIC | Age: 68
End: 2023-05-06

## 2023-05-06 ENCOUNTER — OFFICE VISIT (OUTPATIENT)
Dept: URGENT CARE | Facility: URGENT CARE | Age: 68
End: 2023-05-06
Payer: COMMERCIAL

## 2023-05-06 ENCOUNTER — ANCILLARY PROCEDURE (OUTPATIENT)
Dept: GENERAL RADIOLOGY | Facility: CLINIC | Age: 68
End: 2023-05-06
Attending: STUDENT IN AN ORGANIZED HEALTH CARE EDUCATION/TRAINING PROGRAM
Payer: COMMERCIAL

## 2023-05-06 VITALS
HEART RATE: 76 BPM | TEMPERATURE: 97.4 F | BODY MASS INDEX: 23.51 KG/M2 | OXYGEN SATURATION: 100 % | SYSTOLIC BLOOD PRESSURE: 156 MMHG | WEIGHT: 112.5 LBS | DIASTOLIC BLOOD PRESSURE: 83 MMHG

## 2023-05-06 DIAGNOSIS — J18.9 PNEUMONIA OF RIGHT LOWER LOBE DUE TO INFECTIOUS ORGANISM: Primary | ICD-10-CM

## 2023-05-06 DIAGNOSIS — R05.1 ACUTE COUGH: ICD-10-CM

## 2023-05-06 DIAGNOSIS — D72.819 LEUKOPENIA, UNSPECIFIED TYPE: ICD-10-CM

## 2023-05-06 DIAGNOSIS — R03.0 ELEVATED BP WITHOUT DIAGNOSIS OF HYPERTENSION: ICD-10-CM

## 2023-05-06 DIAGNOSIS — R23.8 CHANGE OF SKIN COLOR: ICD-10-CM

## 2023-05-06 LAB
ALBUMIN SERPL BCG-MCNC: 4.3 G/DL (ref 3.5–5.2)
ALP SERPL-CCNC: 84 U/L (ref 35–104)
ALT SERPL W P-5'-P-CCNC: 63 U/L (ref 10–35)
ANION GAP SERPL CALCULATED.3IONS-SCNC: 10 MMOL/L (ref 7–15)
AST SERPL W P-5'-P-CCNC: 45 U/L (ref 10–35)
BILIRUB SERPL-MCNC: 0.2 MG/DL
BUN SERPL-MCNC: 8.5 MG/DL (ref 8–23)
CALCIUM SERPL-MCNC: 9.3 MG/DL (ref 8.8–10.2)
CHLORIDE SERPL-SCNC: 108 MMOL/L (ref 98–107)
CREAT SERPL-MCNC: 0.57 MG/DL (ref 0.51–0.95)
DEPRECATED HCO3 PLAS-SCNC: 27 MMOL/L (ref 22–29)
ERYTHROCYTE [DISTWIDTH] IN BLOOD BY AUTOMATED COUNT: 14.1 % (ref 10–15)
GFR SERPL CREATININE-BSD FRML MDRD: >90 ML/MIN/1.73M2
GLUCOSE SERPL-MCNC: 109 MG/DL (ref 70–99)
HCT VFR BLD AUTO: 39.3 % (ref 35–47)
HGB BLD-MCNC: 12 G/DL (ref 11.7–15.7)
MCH RBC QN AUTO: 22 PG (ref 26.5–33)
MCHC RBC AUTO-ENTMCNC: 30.5 G/DL (ref 31.5–36.5)
MCV RBC AUTO: 72 FL (ref 78–100)
PLATELET # BLD AUTO: 237 10E3/UL (ref 150–450)
POTASSIUM SERPL-SCNC: 4.3 MMOL/L (ref 3.4–5.3)
PROT SERPL-MCNC: 6.7 G/DL (ref 6.4–8.3)
RBC # BLD AUTO: 5.46 10E6/UL (ref 3.8–5.2)
SODIUM SERPL-SCNC: 145 MMOL/L (ref 136–145)
WBC # BLD AUTO: 3.3 10E3/UL (ref 4–11)

## 2023-05-06 PROCEDURE — 85027 COMPLETE CBC AUTOMATED: CPT | Performed by: STUDENT IN AN ORGANIZED HEALTH CARE EDUCATION/TRAINING PROGRAM

## 2023-05-06 PROCEDURE — 36415 COLL VENOUS BLD VENIPUNCTURE: CPT | Performed by: STUDENT IN AN ORGANIZED HEALTH CARE EDUCATION/TRAINING PROGRAM

## 2023-05-06 PROCEDURE — 99214 OFFICE O/P EST MOD 30 MIN: CPT | Performed by: STUDENT IN AN ORGANIZED HEALTH CARE EDUCATION/TRAINING PROGRAM

## 2023-05-06 PROCEDURE — 80053 COMPREHEN METABOLIC PANEL: CPT | Performed by: STUDENT IN AN ORGANIZED HEALTH CARE EDUCATION/TRAINING PROGRAM

## 2023-05-06 PROCEDURE — 71046 X-RAY EXAM CHEST 2 VIEWS: CPT | Mod: TC | Performed by: RADIOLOGY

## 2023-05-06 RX ORDER — AZITHROMYCIN 250 MG/1
TABLET, FILM COATED ORAL
Qty: 6 TABLET | Refills: 0 | Status: SHIPPED | OUTPATIENT
Start: 2023-05-06 | End: 2023-05-11

## 2023-05-06 NOTE — TELEPHONE ENCOUNTER
Pt's  Mandy is calling back for lab results from today.     Writer advised Mandy no note in chart. Message will be sent to clinic to follow up. Call back if no response or new concerns.     Mandy verbalizes understanding, requests call back to [phone]. Ok to leave a detailed message.     Reason for Disposition    Caller requesting lab results  (Exception: Routine or non-urgent lab result.)    Protocols used: PCP CALL - NO TRIAGE-A-

## 2023-05-06 NOTE — PROGRESS NOTES
Assessment & Plan     Pneumonia of right lower lobe due to infectious organism  Patient presents for evaluation of cough that she has had for the past 2 weeks that is not improving.  She has rhonchi and wheezing of the right lower lobe on exam.  I am going to treat her empirically for suspected pneumonia with azithromycin while waiting the results of the chest x-ray.  Advised to continue to monitor symptoms and follow-up if symptoms persist or worsen.  - azithromycin (ZITHROMAX) 250 MG tablet  Dispense: 6 tablet; Refill: 0    Change of skin color  Patient has icteric look to her skin concerning for possible underlying liver dysfunction.  She has had no nausea, vomiting, changes in appetite or abdominal pain.  I recommended CBC and CMP for further evaluation particularly with taking a look at her liver function tests which will come back tomorrow.  If abnormal I will follow-up with her in regards to recommendations for further evaluation.  - Comprehensive metabolic panel (BMP + Alb, Alk Phos, ALT, AST, Total. Bili, TP)  - CBC with platelets  - Comprehensive metabolic panel (BMP + Alb, Alk Phos, ALT, AST, Total. Bili, TP)  - CBC with platelets    Acute cough  - XR Chest 2 Views    Elevated BP without diagnosis of hypertension   Patient's blood pressure was 170/83 upon arrival and recheck was 156/83.  She has had several elevated blood pressures over the last year and is not on blood pressure medication.  She has never checked her blood pressure at home but they do have a blood pressure cuff that she can use.  I advised her to check her blood pressure at home and gave her guidelines for what is normal and what is concerning for hypertension.  If her blood pressures are in the range of hypertension with home readings I advised her to schedule an appointment with her primary care doctor.     Leukopenia  Patient's white count is 3.3.  Her white count 1 year ago was 3.9.  1 year ago the decrease in white count was  attributed to viral illness which certainly could be the case at this time however if her cough is not improving we may want to investigate this further for concerns of neutropenia.      No follow-ups on file.    SHARDA Granda CNP  [facility]    Jose Johns is a 67 year old female who presents to clinic today for the following health issues:  Chief Complaint   Patient presents with     Cough     Cough for two weeks. Pt has been taking Mucinex with no relief.      HPI      Patient Active Problem List   Diagnosis     CARDIOVASCULAR SCREENING; LDL GOAL LESS THAN 160     Vitreous floaters     Advanced directives, counseling/discussion     Right foot pain     Screening for cervical cancer     Infection associated with implant (H)     History of rhinoplasty     Infection of implant site (H)     Current Outpatient Medications   Medication     atorvastatin (LIPITOR) 20 MG tablet     azithromycin (ZITHROMAX) 250 MG tablet     calcium carbonate (TUMS) 500 MG chewable tablet     Multiple Vitamins-Minerals (MULTIVITAMIN ADULT PO)     Omega-3 Fatty Acids (OMEGA 3 PO)     omeprazole (PRILOSEC) 20 MG DR capsule     benzonatate (TESSALON) 100 MG capsule     diclofenac (VOLTAREN) 50 MG EC tablet     No current facility-administered medications for this visit.         Review of Systems  Constitutional, HEENT, cardiovascular, pulmonary, GI, , musculoskeletal, neuro, skin, endocrine and psych systems are negative, except as otherwise noted.      Objective    BP (!) 156/83 (BP Location: Left arm, Patient Position: Sitting, Cuff Size: Adult Regular)   Pulse 76   Temp 97.4  F (36.3  C) (Tympanic)   Wt 51 kg (112 lb 8 oz)   LMP 09/09/2000 (Approximate)   SpO2 100%   BMI 23.51 kg/m    Physical Exam   GENERAL: alert and no distress  EYES: Eyes grossly normal to inspection, PERRL and conjunctivae and sclerae normal  NECK: no adenopathy, no asymmetry, masses, or scars and thyroid  normal to palpation  RESP: rhonchi R lower posterior and expiratory wheezes R lower posterior, other lung fields are clear to auscultation  CV: regular rate and rhythm, normal S1 S2, no S3 or S4, no murmur, click or rub  MS: no gross musculoskeletal defects noted, no edema  SKIN: no suspicious lesions or rashes, generalized icteric hue to skin  NEURO: Normal strength and tone, mentation intact and speech normal  PSYCH: mentation appears normal, affect normal/bright    Results for orders placed or performed in visit on 05/06/23 (from the past 24 hour(s))   XR Chest 2 Views    Narrative    EXAM: XR CHEST 2 VIEWS  LOCATION: [facility]  DATE/TIME: 5/6/2023 3:14 PM CDT    INDICATION: RLL wheezing and rhonchi, cough x 2 weeks; r o pneumonia.  COMPARISON: Chest x-ray on 6/18/2019.      Impression    IMPRESSION: PA and lateral views of the chest were obtained. Cardiomediastinal silhouette is within normal limits. No suspicious focal pulmonary opacities. No significant pleural effusion or pneumothorax.       CBC with platelets   Result Value Ref Range    WBC Count 3.3 (L) 4.0 - 11.0 10e3/uL    RBC Count 5.46 (H) 3.80 - 5.20 10e6/uL    Hemoglobin 12.0 11.7 - 15.7 g/dL    Hematocrit 39.3 35.0 - 47.0 %    MCV 72 (L) 78 - 100 fL    MCH 22.0 (L) 26.5 - 33.0 pg    MCHC 30.5 (L) 31.5 - 36.5 g/dL    RDW 14.1 10.0 - 15.0 %    Platelet Count 237 150 - 450 10e3/uL

## 2023-05-10 ENCOUNTER — OFFICE VISIT (OUTPATIENT)
Dept: FAMILY MEDICINE | Facility: CLINIC | Age: 68
End: 2023-05-10
Payer: COMMERCIAL

## 2023-05-10 VITALS
HEIGHT: 58 IN | TEMPERATURE: 97.8 F | HEART RATE: 66 BPM | SYSTOLIC BLOOD PRESSURE: 127 MMHG | OXYGEN SATURATION: 99 % | BODY MASS INDEX: 22.96 KG/M2 | WEIGHT: 109.4 LBS | DIASTOLIC BLOOD PRESSURE: 69 MMHG | RESPIRATION RATE: 14 BRPM

## 2023-05-10 DIAGNOSIS — R79.89 ABNORMAL LFTS: ICD-10-CM

## 2023-05-10 DIAGNOSIS — J20.9 ACUTE BRONCHITIS WITH SYMPTOMS > 10 DAYS: Primary | ICD-10-CM

## 2023-05-10 LAB
ALBUMIN SERPL-MCNC: 3.6 G/DL (ref 3.4–5)
ALP SERPL-CCNC: 81 U/L (ref 40–150)
ALT SERPL W P-5'-P-CCNC: 40 U/L (ref 0–50)
AST SERPL W P-5'-P-CCNC: 15 U/L (ref 0–45)
BILIRUB DIRECT SERPL-MCNC: 0.1 MG/DL (ref 0–0.2)
BILIRUB SERPL-MCNC: 0.4 MG/DL (ref 0.2–1.3)
PROT SERPL-MCNC: 7.1 G/DL (ref 6.8–8.8)

## 2023-05-10 PROCEDURE — 99214 OFFICE O/P EST MOD 30 MIN: CPT | Performed by: PREVENTIVE MEDICINE

## 2023-05-10 PROCEDURE — 36415 COLL VENOUS BLD VENIPUNCTURE: CPT | Performed by: PREVENTIVE MEDICINE

## 2023-05-10 PROCEDURE — 80076 HEPATIC FUNCTION PANEL: CPT | Performed by: PREVENTIVE MEDICINE

## 2023-05-10 RX ORDER — ALBUTEROL SULFATE 90 UG/1
2 AEROSOL, METERED RESPIRATORY (INHALATION) EVERY 6 HOURS PRN
Qty: 18 G | Refills: 0 | Status: SHIPPED | OUTPATIENT
Start: 2023-05-10 | End: 2023-06-01

## 2023-05-10 RX ORDER — PREDNISONE 20 MG/1
20 TABLET ORAL 2 TIMES DAILY
Qty: 10 TABLET | Refills: 0 | Status: SHIPPED | OUTPATIENT
Start: 2023-05-10 | End: 2023-05-15

## 2023-05-10 ASSESSMENT — PAIN SCALES - GENERAL: PAINLEVEL: MODERATE PAIN (5)

## 2023-05-10 NOTE — PROGRESS NOTES
Assessment & Plan     Acute bronchitis with symptoms > 10 days  -Chest X ray done recently was negative for infiltrates  -has already completed a course of Azithromycin  -defer additional antibiotics at this time   - albuterol (PROAIR HFA/PROVENTIL HFA/VENTOLIN HFA) 108 (90 Base) MCG/ACT inhaler  Dispense: 18 g; Refill: 0  - predniSONE (DELTASONE) 20 MG tablet  Dispense: 10 tablet; Refill: 0  -if symptoms are not better then consider advanced imaging, inhaled steroid, Lung function tests     Abnormal LFTs  -await imaging   - US Abdomen Limited  -hepatitis C negative previously  - Hepatic panel (Albumin, ALT, AST, Bili, Alk Phos, TP)  -does not use alcohol or Acetaminophen    -If increased abdominal pain, fever over 101 F, emesis, rectal bleeding, melena, then needs to be seen in ER         Ordering of each unique test  Prescription drug management  20 minutes spent by me on the date of the encounter doing chart review, history and exam, documentation and further activities per the note     MED REC REQUIRED  Post Medication Reconciliation Status: patient was not discharged from an inpatient facility or U    Milagro Acosta MD MPH    [facility]    Jose Johns is a 67 year old, presenting for the following health issues:  Hospital F/U        5/10/2023     2:56 PM   Additional Questions   Roomed by Dolly   Accompanied by Hung,      History of Present Illness       Reason for visit:  To see [facility]    She eats 2-3 servings of fruits and vegetables daily.She consumes 1 sweetened beverage(s) daily.She exercises with enough effort to increase her heart rate 10 to 19 minutes per day.  She exercises with enough effort to increase her heart rate 5 days per week.   She is taking medications regularly.       ED/UC Followup:    Facility:  [facility]   Date of visit: 05/06/2023  Reason for visit: Pneumonia of right lower lobe due to  "infectious organism.  Current Status: No change, Still has the wet cough causing a sore throat, and fatigue.    Seen in Urgent care 5/6/23:    There was concern for pneumonia so empirically treated with Azithromycin, chest x ray negative for infiltrates     Abnormal LFTS+   Hep C negative in 2016    Wet cough+  No fever  No hemoptysis  No leg edema  Completed antibiotics  2 weeks of symptoms now  Home Covid test not done  No sick contacts  No abdominal pain  No emesis  No bowel changes  No rectal bleeding  No alcohol  No large amounts of Tylenol     EXAM: XR CHEST 2 VIEWS  LOCATION: [facility]  DATE/TIME: 5/6/2023 3:14 PM CDT     INDICATION: RLL wheezing and rhonchi, cough x 2 weeks; r o pneumonia.  COMPARISON: Chest x-ray on 6/18/2019.                                                                      IMPRESSION: PA and lateral views of the chest were obtained. Cardiomediastinal silhouette is within normal limits. No suspicious focal pulmonary opacities. No significant pleural effusion or pneumothorax.     Review of Systems   Constitutional, HEENT, cardiovascular, pulmonary, gi and gu systems are negative, except as otherwise noted.      Objective    /69 (BP Location: Left arm, Patient Position: Sitting, Cuff Size: Adult Regular)   Pulse 66   Temp 97.8  F (36.6  C) (Oral)   Resp 14   Ht 1.482 m (4' 10.35\")   Wt 49.6 kg (109 lb 6.4 oz)   LMP 09/09/2000 (Approximate)   SpO2 99%   BMI 22.59 kg/m    Body mass index is 22.59 kg/m .  Physical Exam   GENERAL APPEARANCE: healthy, alert and no distress  EYES: Eyes grossly normal to inspection and conjunctivae and sclerae normal  HENT:  mouth without ulcers or lesions  NECK: no adenopathy and trachea midline and normal to palpation  RESP: Few scattered rhonchi bilaterally+   CV: regular rates and rhythm, normal S1 S2, no S3 or S4 and no murmur, click or rub  ABDOMEN: soft, non-tender and no rebound or guarding   MS: " extremities normal- no gross deformities noted and peripheral pulses normal  SKIN: no suspicious lesions or rashes  NEURO: Normal strength and tone, mentation intact and speech normal  PSYCH: mentation appears normal      No results found for any visits on 05/10/23.     Office Visit on 05/06/2023   Component Date Value Ref Range Status     WBC Count 05/06/2023 3.3 (L)  4.0 - 11.0 10e3/uL Final     RBC Count 05/06/2023 5.46 (H)  3.80 - 5.20 10e6/uL Final     Hemoglobin 05/06/2023 12.0  11.7 - 15.7 g/dL Final     Hematocrit 05/06/2023 39.3  35.0 - 47.0 % Final     MCV 05/06/2023 72 (L)  78 - 100 fL Final     MCH 05/06/2023 22.0 (L)  26.5 - 33.0 pg Final     MCHC 05/06/2023 30.5 (L)  31.5 - 36.5 g/dL Final     RDW 05/06/2023 14.1  10.0 - 15.0 % Final     Platelet Count 05/06/2023 237  150 - 450 10e3/uL Final     Sodium 05/06/2023 145  136 - 145 mmol/L Final     Potassium 05/06/2023 4.3  3.4 - 5.3 mmol/L Final     Chloride 05/06/2023 108 (H)  98 - 107 mmol/L Final     Carbon Dioxide (CO2) 05/06/2023 27  22 - 29 mmol/L Final     Anion Gap 05/06/2023 10  7 - 15 mmol/L Final     Urea Nitrogen 05/06/2023 8.5  8.0 - 23.0 mg/dL Final     Creatinine 05/06/2023 0.57  0.51 - 0.95 mg/dL Final     Calcium 05/06/2023 9.3  8.8 - 10.2 mg/dL Final     Glucose 05/06/2023 109 (H)  70 - 99 mg/dL Final     Alkaline Phosphatase 05/06/2023 84  35 - 104 U/L Final     AST 05/06/2023 45 (H)  10 - 35 U/L Final     ALT 05/06/2023 63 (H)  10 - 35 U/L Final     Protein Total 05/06/2023 6.7  6.4 - 8.3 g/dL Final     Albumin 05/06/2023 4.3  3.5 - 5.2 g/dL Final     Bilirubin Total 05/06/2023 0.2  <=1.2 mg/dL Final     GFR Estimate 05/06/2023 >90  >60 mL/min/1.73m2 Final    eGFR calculated using 2021 CKD-EPI equation.

## 2023-05-10 NOTE — PATIENT INSTRUCTIONS
At [facility], we strive to deliver an exceptional experience to you, every time we see you. If you receive a survey, please complete it as we do value your feedback.  If you have MyChart, you can expect to receive results automatically within 24 hours of their completion.  Your provider will send a note interpreting your results as well.   If you do not have MyChart, you should receive your results in about a week by mail.    Your care team:                            Family Medicine Internal Medicine   MD Kumar Moss MD Shantel Branch-Fleming, MD Srinivasa Vaka, MD Katya Belousova, PASHARDA Yu CNP, MD ([facility]   Arya Justice, MD Lizeth Sorensen MD Amelia Massimini APRN MARIELY Salinas APRN MD Garrick Rachel MD          Clinic hours: Monday - Thursday 7 am-6 pm; Fridays 7 am-5 pm.   Urgent care: Monday - Friday 10 am- 8 pm; Saturday and Sunday 9 am-5 pm.    Clinic: [phone]       [facility]: Monday - Thursday 8 am - 7 pm; Friday 8 am - 6 pm  [facility]: [phone]

## 2023-05-10 NOTE — LETTER
May 11, 2023      Juanita Galvan  [address]  [place] [address]        Dear MsAparna,    We are writing to inform you of your test results.    Liver function tests are normal.   Plan of care and follow up as discussed in clinic.    Resulted Orders   Hepatic panel (Albumin, ALT, AST, Bili, Alk Phos, TP)   Result Value Ref Range    Bilirubin Total 0.4 0.2 - 1.3 mg/dL    Bilirubin Direct 0.1 0.0 - 0.2 mg/dL    Protein Total 7.1 6.8 - 8.8 g/dL    Albumin 3.6 3.4 - 5.0 g/dL    Alkaline Phosphatase 81 40 - 150 U/L    AST 15 0 - 45 U/L    ALT 40 0 - 50 U/L       If you have any questions or concerns, please call the clinic at the number listed above.       Sincerely,      Milagro Acosta MD

## 2023-05-11 NOTE — RESULT ENCOUNTER NOTE
Please send a letter:    Dear Juanita Galvan,    Liver function tests are normal.  Plan of care and follow up as discussed in clinic.  Please let me know if you have any questions and thank you for choosing [facility].    Regards,    Milagro Acosta MD MPH

## 2023-05-16 ENCOUNTER — ANCILLARY PROCEDURE (OUTPATIENT)
Dept: ULTRASOUND IMAGING | Facility: CLINIC | Age: 68
End: 2023-05-16
Attending: PREVENTIVE MEDICINE
Payer: COMMERCIAL

## 2023-05-16 DIAGNOSIS — R79.89 ABNORMAL LFTS: ICD-10-CM

## 2023-05-16 PROCEDURE — 76705 ECHO EXAM OF ABDOMEN: CPT | Mod: TC | Performed by: RADIOLOGY

## 2023-05-16 NOTE — LETTER
May 16, 2023      Juanita Galvan  [address]  [place] [address]          Dear Juanita Galvan,     Ultrasound of the liver is showing fat deposits. Regular exercise and a healthy diet will help with this. No other concerning changes seen.     Please let me know if you have any questions and thank you for choosing [facility].     Regards,     Milagro Acosta MD MPH     Resulted Orders   US Abdomen Limited    Narrative    US ABDOMEN LIMITED 5/16/2023 9:08 AM    CLINICAL HISTORY: Abnormal liver function tests.  TECHNIQUE: Limited abdominal ultrasound.  COMPARISON: None.    FINDINGS:  GALLBLADDER: The gallbladder is unremarkable. No gallstones, wall  thickening, or pericholecystic fluid. Negative sonographic Crawford's  sign.    BILE DUCTS: There is no biliary dilatation. The common duct measures 4  mm. Portions of the common duct could not be visualized due to  overlying bowel gas.    LIVER: Hepatic steatosis. No focal hepatic masses.    RIGHT KIDNEY: Unremarkable. No hydronephrosis.    PANCREAS: The visualized portions of the pancreas are normal.    No ascites.      Impression    IMPRESSION:  1.  Hepatic steatosis.  2.  Otherwise unremarkable right upper quadrant ultrasound. No biliary  dilatation.    HAMLET PERRY MD         SYSTEM ID:  [id number]

## 2023-05-16 NOTE — RESULT ENCOUNTER NOTE
Please send a letter:    Dear Juanita Galvan,    Ultrasound of the liver is showing fat deposits. Regular exercise and a healthy diet will help with this. No other concerning changes seen.     Please let me know if you have any questions and thank you for choosing [facility].    Regards,    Milagro Acosta MD MPH

## 2023-08-02 ENCOUNTER — ANCILLARY ORDERS (OUTPATIENT)
Dept: MAMMOGRAPHY | Facility: CLINIC | Age: 68
End: 2023-08-02

## 2023-08-02 DIAGNOSIS — Z12.31 VISIT FOR SCREENING MAMMOGRAM: Primary | ICD-10-CM

## 2023-09-08 ENCOUNTER — ANCILLARY PROCEDURE (OUTPATIENT)
Dept: MAMMOGRAPHY | Facility: CLINIC | Age: 68
End: 2023-09-08
Attending: PREVENTIVE MEDICINE
Payer: COMMERCIAL

## 2023-09-08 DIAGNOSIS — Z12.31 VISIT FOR SCREENING MAMMOGRAM: ICD-10-CM

## 2023-09-08 PROCEDURE — 77067 SCR MAMMO BI INCL CAD: CPT | Mod: TC | Performed by: RADIOLOGY

## 2023-10-20 ENCOUNTER — PATIENT OUTREACH (OUTPATIENT)
Dept: GASTROENTEROLOGY | Facility: CLINIC | Age: 68
End: 2023-10-20
Payer: COMMERCIAL

## 2023-10-30 DIAGNOSIS — E78.2 MIXED HYPERLIPIDEMIA: ICD-10-CM

## 2023-10-30 RX ORDER — ATORVASTATIN CALCIUM 20 MG/1
TABLET, FILM COATED ORAL
Qty: 90 TABLET | Refills: 0 | Status: SHIPPED | OUTPATIENT
Start: 2023-10-30 | End: 2024-01-29

## 2023-12-12 ENCOUNTER — OFFICE VISIT (OUTPATIENT)
Dept: FAMILY MEDICINE | Facility: CLINIC | Age: 68
End: 2023-12-12
Payer: COMMERCIAL

## 2023-12-12 VITALS
BODY MASS INDEX: 22.38 KG/M2 | DIASTOLIC BLOOD PRESSURE: 75 MMHG | OXYGEN SATURATION: 99 % | HEIGHT: 59 IN | TEMPERATURE: 97.5 F | RESPIRATION RATE: 16 BRPM | SYSTOLIC BLOOD PRESSURE: 130 MMHG | HEART RATE: 78 BPM | WEIGHT: 111 LBS

## 2023-12-12 DIAGNOSIS — Z28.21 INFLUENZA VACCINE REFUSED: ICD-10-CM

## 2023-12-12 DIAGNOSIS — Z00.00 ROUTINE GENERAL MEDICAL EXAMINATION AT A HEALTH CARE FACILITY: Primary | ICD-10-CM

## 2023-12-12 DIAGNOSIS — E28.39 ESTROGEN DEFICIENCY: ICD-10-CM

## 2023-12-12 DIAGNOSIS — E78.2 MIXED HYPERLIPIDEMIA: ICD-10-CM

## 2023-12-12 PROCEDURE — 80061 LIPID PANEL: CPT | Performed by: PREVENTIVE MEDICINE

## 2023-12-12 PROCEDURE — 82947 ASSAY GLUCOSE BLOOD QUANT: CPT | Performed by: PREVENTIVE MEDICINE

## 2023-12-12 PROCEDURE — 99397 PER PM REEVAL EST PAT 65+ YR: CPT | Performed by: PREVENTIVE MEDICINE

## 2023-12-12 PROCEDURE — 36415 COLL VENOUS BLD VENIPUNCTURE: CPT | Performed by: PREVENTIVE MEDICINE

## 2023-12-12 ASSESSMENT — ACTIVITIES OF DAILY LIVING (ADL): CURRENT_FUNCTION: NO ASSISTANCE NEEDED

## 2023-12-12 ASSESSMENT — ENCOUNTER SYMPTOMS
WEAKNESS: 0
HEADACHES: 0
COUGH: 1
SORE THROAT: 0
CONSTIPATION: 0
FREQUENCY: 0
NAUSEA: 0
CHILLS: 0
ABDOMINAL PAIN: 0
PALPITATIONS: 0
DYSURIA: 0
BREAST MASS: 0
DIARRHEA: 0
PARESTHESIAS: 0
JOINT SWELLING: 0
MYALGIAS: 0
HEMATOCHEZIA: 0
DIZZINESS: 0
SHORTNESS OF BREATH: 0
HEARTBURN: 0
FEVER: 0
EYE PAIN: 0
NERVOUS/ANXIOUS: 0
HEMATURIA: 0
ARTHRALGIAS: 1

## 2023-12-12 ASSESSMENT — PAIN SCALES - GENERAL: PAINLEVEL: MODERATE PAIN (5)

## 2023-12-12 NOTE — PROGRESS NOTES
"   SUBJECTIVE:   Juanita Johns is a 68 year old, presenting for the following:  Physical (Cough at night)        12/12/2023     8:19 AM   Additional Questions   Roomed by Janie   Accompanied by self         12/12/2023     8:19 AM   Patient Reported Additional Medications   Patient reports taking the following new medications none       Healthy Habits:     In general, how would you rate your overall health?  Excellent    Frequency of exercise:  4-5 days/week    Duration of exercise:  15-30 minutes    Do you usually eat at least 4 servings of fruit and vegetables a day, include whole grains    & fiber and avoid regularly eating high fat or \"junk\" foods?  Yes    Taking medications regularly:  Yes    Medication side effects:  None    Ability to successfully perform activities of daily living:  No assistance needed    Home Safety:  No safety concerns identified    Hearing Impairment:  No hearing concerns    In the past 6 months, have you been bothered by leaking of urine?  No    In general, how would you rate your overall mental or emotional health?  Excellent    Additional concerns today:  No    Hyperlipidemia Follow-Up     Are you regularly taking any medication or supplement to lower your cholesterol?   Yes- statin  Are you having muscle aches or other side effects that you think could be caused by your cholesterol lowering medication?  No        Social History     Tobacco Use    Smoking status: Never    Smokeless tobacco: Never   Substance Use Topics    Alcohol use: No             12/12/2023     7:57 AM   Alcohol Use   Prescreen: >3 drinks/day or >7 drinks/week? No          No data to display              Reviewed orders with patient.  Reviewed health maintenance and updated orders accordingly - Yes  Lab work is in process  Labs reviewed in EPIC  BP Readings from Last 3 Encounters:   12/12/23 130/75   05/10/23 127/69   05/06/23 (!) 156/83    Wt Readings from Last 3 Encounters:   12/12/23 50.3 kg (111 lb)   05/10/23 49.6 " kg (109 lb 6.4 oz)   05/06/23 51 kg (112 lb 8 oz)                  Patient Active Problem List   Diagnosis    CARDIOVASCULAR SCREENING; LDL GOAL LESS THAN 160    Vitreous floaters    Advanced directives, counseling/discussion    Right foot pain    Screening for cervical cancer    Infection associated with implant (H24)    History of rhinoplasty    Infection of implant site (H24)     Past Surgical History:   Procedure Laterality Date    COLONOSCOPY  3/26/2012    Procedure:COLONOSCOPY; COLONOSCOPY, SCREEN; Surgeon:MICAH RICHARDSON; Location:[facility] OR    COLONOSCOPY WITH CO2 INSUFFLATION N/A 6/7/2021    Procedure: COLONOSCOPY, WITH CO2 INSUFFLATION;  Surgeon: Angela Drummond DO;  Location: [facility] OR    NASAL ENDOSCOPY N/A 9/10/2020    Procedure: Removal of Nasal Implant, Rigid Nasal Endoscopy;  Surgeon: Yanci Perry MD;  Location: [facility] OR    NOSE SURGERY  2011    dorsal splint       Social History     Tobacco Use    Smoking status: Never    Smokeless tobacco: Never   Substance Use Topics    Alcohol use: No     Family History   Problem Relation Age of Onset    No Known Problems Mother     No Known Problems Father     No Known Problems Brother     No Known Problems Sister     No Known Problems Sister     No Known Problems Sister     Cancer No family hx of     Diabetes No family hx of     Hypertension No family hx of     Cerebrovascular Disease No family hx of     Thyroid Disease No family hx of     Glaucoma No family hx of     Macular Degeneration No family hx of     Deep Vein Thrombosis No family hx of     Pulmonary Embolism No family hx of     Anesthesia Reaction No family hx of          Current Outpatient Medications   Medication Sig Dispense Refill    atorvastatin (LIPITOR) 20 MG tablet TAKE 1 TABLET BY MOUTH DAILY FOR CHOLESTEROL 90 tablet 0    Multiple Vitamins-Minerals (MULTIVITAMIN ADULT PO) Take 1 tablet by mouth every morning      Omega-3 Fatty Acids (OMEGA 3 PO)       omeprazole (PRILOSEC) 20 MG   capsule TAKE 1 CAPSULE(20 MG) BY MOUTH DAILY 30 capsule 0    VENTOLIN  (90 Base) MCG/ACT inhaler INHALE 2 PUFFS INTO THE LUNGS EVERY 6 HOURS AS NEEDED FOR SHORTNESS OF BREATH OR WHEEZING OR COUGH 18 g 0    calcium carbonate (TUMS) 500 MG chewable tablet Take 1 chew tab by mouth 2 times daily Pt takes as needed (Patient not taking: Reported on 5/10/2023)      diclofenac (VOLTAREN) 50 MG EC tablet Take 1 tablet (50 mg) by mouth 2 times daily for 7 days 28 tablet 1     Allergies   Allergen Reactions    Nkda [No Known Drug Allergy]     Seasonal Allergies        Breast Cancer Screening:    FHS-7:       7/16/2021     7:51 AM 8/12/2022     3:09 PM 9/8/2023     3:01 PM   Breast CA Risk Assessment (FHS-7)   Did any of your first-degree relatives have breast or ovarian cancer? No No No   Did any of your relatives have bilateral breast cancer? No No No   Did any man in your family have breast cancer? No No No   Did any woman in your family have breast and ovarian cancer? No No No   Did any woman in your family have breast cancer before age 50 y? No No No   Do you have 2 or more relatives with breast and/or ovarian cancer? No No No   Do you have 2 or more relatives with breast and/or bowel cancer? No No No     click delete button to remove this line now  Mammogram Screening: Recommended mammography every 1-2 years with patient discussion and risk factor consideration  Pertinent mammograms are reviewed under the imaging tab.    History of abnormal Pap smear: NO - age 67 - see link Cervical Cytology Screening Guidelines      Latest Ref Rng & Units 8/13/2019    10:30 AM 8/13/2019    10:13 AM 5/27/2016    12:00 AM   PAP / HPV   PAP (Historical)   NIL  NIL    HPV 16 DNA NEG^Negative Negative      HPV 18 DNA NEG^Negative Negative      Other HR HPV NEG^Negative Negative        Reviewed and updated as needed this visit by clinical staff   Tobacco  Allergies  Meds  Problems  Med Hx  Surg Hx  Fam Hx          Reviewed and  "updated as needed this visit by Provider   Tobacco  Allergies  Meds  Problems  Med Hx  Surg Hx  Fam Hx         Past Medical History:   Diagnosis Date    Lipoma     Melasma     Vitreous floaters 3/5/2013      Past Surgical History:   Procedure Laterality Date    COLONOSCOPY  3/26/2012    Procedure:COLONOSCOPY; COLONOSCOPY, SCREEN; Surgeon:MICAH RICHARDSON; Location:[facility]    COLONOSCOPY WITH CO2 INSUFFLATION N/A 6/7/2021    Procedure: COLONOSCOPY, WITH CO2 INSUFFLATION;  Surgeon: Angela Drummond DO;  Location: [facility]    NASAL ENDOSCOPY N/A 9/10/2020    Procedure: Removal of Nasal Implant, Rigid Nasal Endoscopy;  Surgeon: Yanci Perry MD;  Location: [facility]    NOSE SURGERY  2011    dorsal splint       Review of Systems   Constitutional:  Negative for chills and fever.   HENT:  Negative for congestion, ear pain, hearing loss and sore throat.    Eyes:  Negative for pain and visual disturbance.   Respiratory:  Positive for cough. Negative for shortness of breath.    Cardiovascular:  Negative for chest pain, palpitations and peripheral edema.   Gastrointestinal:  Negative for abdominal pain, constipation, diarrhea, heartburn, hematochezia and nausea.   Breasts:  Negative for tenderness, breast mass and discharge.   Genitourinary:  Negative for dysuria, frequency, genital sores, hematuria, pelvic pain, urgency, vaginal bleeding and vaginal discharge.   Musculoskeletal:  Positive for arthralgias. Negative for joint swelling and myalgias.   Skin:  Negative for rash.   Neurological:  Negative for dizziness, weakness, headaches and paresthesias.   Psychiatric/Behavioral:  Negative for mood changes. The patient is not nervous/anxious.         OBJECTIVE:   /75   Pulse 78   Temp 97.5  F (36.4  C) (Oral)   Resp 16   Ht 1.51 m (4' 11.45\")   Wt 50.3 kg (111 lb)   LMP 09/09/2000 (Approximate)   SpO2 99%   BMI 22.08 kg/m    Physical Exam  GENERAL APPEARANCE: healthy, alert and no distress  EYES: Eyes " grossly normal to inspection and conjunctivae and sclerae normal  HENT: Intact TMs  NECK: no adenopathy and trachea midline and normal to palpation  RESP: lungs clear to auscultation - no rales, rhonchi or wheezes  CV: regular rates and rhythm, normal S1 S2, no S3 or S4 and no murmur, click or rub  ABDOMEN: soft, non-tender and no rebound or guarding   MS: extremities normal- no gross deformities noted and peripheral pulses normal  SKIN: no suspicious lesions or rashes  NEURO: Normal strength and tone, mentation intact and speech normal  PSYCH: mentation appears normal      Diagnostic Test Results:  Labs reviewed in Epic  No results found for this or any previous visit (from the past 24 hour(s)).    ASSESSMENT/PLAN:   Juanita Johns was seen today for physical.    Diagnoses and all orders for this visit:    Routine general medical examination at a health care facility  -     Lipid panel reflex to direct LDL Non-fasting; Future  -     Glucose; Future  -preventive guidelines reviewed     Mixed hyperlipidemia  -     Lipid panel reflex to direct LDL Non-fasting; Future    Estrogen deficiency  -     DX Hip/Pelvis/Spine; Future    Influenza vaccine refused    Other orders  -     REVIEW OF HEALTH MAINTENANCE PROTOCOL ORDERS  -     PRIMARY CARE FOLLOW-UP SCHEDULING; Future  -     PRIMARY CARE FOLLOW-UP SCHEDULING; Future          COUNSELING:  Reviewed preventive health counseling, as reflected in patient instructions       Regular exercise       Healthy diet/nutrition       Vision screening       Immunizations  Declined: Influenza due to Concerns about side effects/safety             Osteoporosis prevention/bone health        She reports that she has never smoked. She has never used smokeless tobacco.          Milagro Acosta MD MPH    [facility]

## 2023-12-12 NOTE — LETTER
December 13, 2023      Juanita Galvan  [address]  [place] [address]        Dear ,    We are writing to inform you of your test results.    Cholesterol is at goal for you.   Glucose is normal, you do not have diabetes.     Resulted Orders   Lipid panel reflex to direct LDL Non-fasting   Result Value Ref Range    Cholesterol 189 <200 mg/dL    Triglycerides 117 <150 mg/dL    Direct Measure HDL 54 >=50 mg/dL    LDL Cholesterol Calculated 112 (H) <=100 mg/dL    Non HDL Cholesterol 135 (H) <130 mg/dL    Patient Fasting > 8hrs? Yes     Narrative    Cholesterol  Desirable:  <200 mg/dL    Triglycerides  Normal:  Less than 150 mg/dL  Borderline High:  150-199 mg/dL  High:  200-499 mg/dL  Very High:  Greater than or equal to 500 mg/dL    Direct Measure HDL  Female:  Greater than or equal to 50 mg/dL   Male:  Greater than or equal to 40 mg/dL    LDL Cholesterol  Desirable:  <100mg/dL  Above Desirable:  100-129 mg/dL   Borderline High:  130-159 mg/dL   High:  160-189 mg/dL   Very High:  >= 190 mg/dL    Non HDL Cholesterol  Desirable:  130 mg/dL  Above Desirable:  130-159 mg/dL  Borderline High:  160-189 mg/dL  High:  190-219 mg/dL  Very High:  Greater than or equal to 220 mg/dL   Glucose   Result Value Ref Range    Glucose 98 70 - 99 mg/dL    Patient Fasting > 8hrs? Yes        If you have any questions or concerns, please call the clinic at the number listed above.       Sincerely,      Milagro Acosta MD

## 2023-12-13 LAB
CHOLEST SERPL-MCNC: 189 MG/DL
FASTING STATUS PATIENT QL REPORTED: YES
FASTING STATUS PATIENT QL REPORTED: YES
GLUCOSE SERPL-MCNC: 98 MG/DL (ref 70–99)
HDLC SERPL-MCNC: 54 MG/DL
LDLC SERPL CALC-MCNC: 112 MG/DL
NONHDLC SERPL-MCNC: 135 MG/DL
TRIGL SERPL-MCNC: 117 MG/DL

## 2023-12-13 NOTE — RESULT ENCOUNTER NOTE
Please send a letter:    Dear Juanita Galvan,    Cholesterol is at goal for you.  Glucose is normal, you do not have diabetes.    Please let me know if you have any questions and thank you for choosing [facility].    Regards,    Milagro Acosta MD MPH

## 2024-01-12 ENCOUNTER — ANCILLARY PROCEDURE (OUTPATIENT)
Dept: BONE DENSITY | Facility: CLINIC | Age: 69
End: 2024-01-12
Attending: PREVENTIVE MEDICINE
Payer: COMMERCIAL

## 2024-01-12 DIAGNOSIS — E28.39 ESTROGEN DEFICIENCY: ICD-10-CM

## 2024-01-12 PROCEDURE — 77080 DXA BONE DENSITY AXIAL: CPT | Mod: TC | Performed by: RADIOLOGY

## 2024-01-12 NOTE — RESULT ENCOUNTER NOTE
Please send a letter:    Dear Juanita Galvan,      The bone density is showing Osteoporosis, this is decrease in the strength of the bones that can increase risk of fractures.   You should be taking 4283-4200 mg of calcium with Vitamin D 1000 units daily   You should be exercising regularly.  Please schedule a visit (Virtual visit OK) to discuss medication options.  Please let me know if you have any questions and thank you for choosing [facility].    Regards,    Milagro Acosta MD MPH

## 2024-01-12 NOTE — LETTER
January 12, 2024      Juanita Johns YANET Mandy  [address]  [place] [address]        Dear MsAparna,    We are writing to inform you of your test results.    The bone density is showing Osteoporosis, this is decrease in the strength of the bones that can increase risk of fractures.   You should be taking 3855-9215 mg of calcium with Vitamin D 1000 units daily   You should be exercising regularly.   Please schedule a visit (Virtual visit OK) to discuss medication options.   Please let me know if you have any questions and thank you for choosing [facility].     Resulted Orders   DX Hip/Pelvis/Spine    Narrative    EXAM: DX HIP/PELVIS/SPINE  LOCATION: [facility]  DATE: 1/12/2024    INDICATION: Estrogen deficiency.  DEMOGRAPHICS: Age- 68 years. Gender- Female. Menopausal status- Postmenopausal.  COMPARISON: No prior studies available on the current scanner.  TECHNIQUE: Dual-energy x-ray absorptiometry (DXA) performed with routine technique.    FINDINGS:    DXA RESULTS  -Lumbar Spine: L1-L4: BMD: 0.887 g/cm2. T-score: -2.4. Z-score: -0.8.  -RIGHT Hip Total: BMD: 0.789 g/cm2. T-score: -1.7. Z-score: -0.5.  -RIGHT Hip Femoral neck: BMD: 0.641 g/cm2. T-score: -2.9. Z-score: -1.3.  -LEFT Hip Total: BMD: 0.820 g/cm2. T-score: -1.5. Z-score: -0.2.  -LEFT Hip Femoral neck: BMD: 0.701 g/cm2. T-score: -2.4. Z-score: -0.9.    WHO T-SCORE CRITERIA  -Normal: T score at or above -1 SD  -Osteopenia: T score between -1 and -2.5 SD  -Osteoporosis: T score at or below -2.5 SD    The World Health Organization (WHO) criteria is applicable to perimenopausal females, postmenopausal females, and men aged 50 years or older.    FRACTURE RISK  -The FRAX risk calculator is not applicable due to osteoporosis.    RECOMMENDATIONS  The patient's BMD is consistent with osteoporosis, and he/she is at increased fracture risk. If not currently being treated for low BMD, this would merit treatment according to the Bone Health and  Osteoporosis Foundation.      Impression    IMPRESSION: OSTEOPOROSIS. T score meets the WHO criteria for osteoporosis at one or more measured sites. The risk of osteoporotic fracture increases approximately two-fold for each standard deviation decrease in T-score.        If you have any questions or concerns, please call the clinic at the number listed above.       Sincerely,      Milagro Acosta MD

## 2024-01-29 DIAGNOSIS — E78.2 MIXED HYPERLIPIDEMIA: ICD-10-CM

## 2024-01-29 RX ORDER — ATORVASTATIN CALCIUM 20 MG/1
TABLET, FILM COATED ORAL
Qty: 90 TABLET | Refills: 2 | Status: SHIPPED | OUTPATIENT
Start: 2024-01-29

## 2024-03-08 ENCOUNTER — OFFICE VISIT (OUTPATIENT)
Dept: URGENT CARE | Facility: URGENT CARE | Age: 69
End: 2024-03-08
Payer: COMMERCIAL

## 2024-03-08 VITALS
DIASTOLIC BLOOD PRESSURE: 80 MMHG | BODY MASS INDEX: 21.62 KG/M2 | TEMPERATURE: 98.6 F | RESPIRATION RATE: 16 BRPM | SYSTOLIC BLOOD PRESSURE: 129 MMHG | HEART RATE: 98 BPM | WEIGHT: 108.7 LBS | OXYGEN SATURATION: 98 %

## 2024-03-08 DIAGNOSIS — Z87.01 HISTORY OF PNEUMONIA: ICD-10-CM

## 2024-03-08 DIAGNOSIS — R06.2 WHEEZING: ICD-10-CM

## 2024-03-08 DIAGNOSIS — J20.9 ACUTE BRONCHITIS WITH SYMPTOMS > 10 DAYS: Primary | ICD-10-CM

## 2024-03-08 PROCEDURE — 99214 OFFICE O/P EST MOD 30 MIN: CPT | Performed by: PHYSICIAN ASSISTANT

## 2024-03-08 RX ORDER — ALBUTEROL SULFATE 90 UG/1
1 AEROSOL, METERED RESPIRATORY (INHALATION) EVERY 6 HOURS PRN
Qty: 18 G | Refills: 0 | Status: SHIPPED | OUTPATIENT
Start: 2024-03-08 | End: 2024-04-24

## 2024-03-08 RX ORDER — PREDNISONE 20 MG/1
20 TABLET ORAL 2 TIMES DAILY
Qty: 10 TABLET | Refills: 0 | Status: SHIPPED | OUTPATIENT
Start: 2024-03-08 | End: 2024-03-13

## 2024-03-08 RX ORDER — AZITHROMYCIN 250 MG/1
TABLET, FILM COATED ORAL
Qty: 6 TABLET | Refills: 0 | Status: SHIPPED | OUTPATIENT
Start: 2024-03-08

## 2024-03-08 NOTE — PROGRESS NOTES
Chief Complaint   Patient presents with    Urgent Care    Cough     For one week, took Dayquil and Delsym     Dizziness         ASSESSMENT:     ICD-10-CM    1. Acute bronchitis with symptoms > 10 days  J20.9 azithromycin (ZITHROMAX Z-TIGIST) 250 MG tablet     albuterol (PROAIR HFA/PROVENTIL HFA/VENTOLIN HFA) 108 (90 Base) MCG/ACT inhaler     predniSONE (DELTASONE) 20 MG tablet      2. Wheezing  R06.2 azithromycin (ZITHROMAX Z-TIGIST) 250 MG tablet     albuterol (PROAIR HFA/PROVENTIL HFA/VENTOLIN HFA) 108 (90 Base) MCG/ACT inhaler     predniSONE (DELTASONE) 20 MG tablet      3. History of pneumonia  Z87.01             PLAN: Acute bronchitis with wheezing.  History of pneumonia.  Z-Tigist.  Oral prednisone and albuterol inhaler.  I have discussed clinical findings with patient.  Side effects of medications discussed.  Symptomatic care is discussed.  I have discussed the possibility of  worsening symptoms and indication to RTC or go to the ER if they occur.  All questions are answered, patient indicates understanding of these issues and is in agreement with plan.   Patient care instructions are discussed/given at the end of visit.   Lots of rest and fluids.      Oneida Mueller PA-C      SUBJECTIVE:  68-year-old female presents with wet cough for 10 days.  Has been coughing so hard her ribs hurt at times.  Sometimes with coughing will also get a little dizzy.  No fever or chills.  No vomiting or diarrhea.  No fever or chills.  No shortness of breath.  History of pneumonia.  Using Delsym and DayQuil without relief.    Allergies   Allergen Reactions    Nkda [No Known Drug Allergy]     Seasonal Allergies        Past Medical History:   Diagnosis Date    Lipoma     Melasma     Vitreous floaters 3/5/2013       atorvastatin (LIPITOR) 20 MG tablet, TAKE 1 TABLET BY MOUTH DAILY FOR CHOLESTEROL  calcium carbonate (TUMS) 500 MG chewable tablet, Take 1 chew tab by mouth 2 times daily Pt takes as needed (Patient not taking: Reported on  5/10/2023)  diclofenac (VOLTAREN) 50 MG EC tablet, Take 1 tablet (50 mg) by mouth 2 times daily for 7 days  Multiple Vitamins-Minerals (MULTIVITAMIN ADULT PO), Take 1 tablet by mouth every morning  Omega-3 Fatty Acids (OMEGA 3 PO),   omeprazole (PRILOSEC) 20 MG DR capsule, TAKE 1 CAPSULE(20 MG) BY MOUTH DAILY  VENTOLIN  (90 Base) MCG/ACT inhaler, INHALE 2 PUFFS INTO THE LUNGS EVERY 6 HOURS AS NEEDED FOR SHORTNESS OF BREATH OR WHEEZING OR COUGH    No current facility-administered medications on file prior to visit.      Social History     Tobacco Use    Smoking status: Never    Smokeless tobacco: Never   Substance Use Topics    Alcohol use: No       ROS:  CONSTITUTIONAL: Negative for fatigue or fever.  EYES: Negative for eye problems.  ENT: As above.  RESP: As above.  CV: Negative for chest pains.  GI: Negative for vomiting.  MUSCULOSKELETAL:  Negative for significant muscle or joint pains.  NEUROLOGIC: Negative for headaches.  SKIN: Negative for rash.  PSYCH: Normal mentation for age.    OBJECTIVE:  /80 (BP Location: Left arm, Patient Position: Sitting, Cuff Size: Adult Regular)   Pulse 98   Temp 98.6  F (37  C) (Tympanic)   Resp 16   Wt 49.3 kg (108 lb 11.2 oz)   LMP 09/09/2000 (Approximate)   SpO2 98%   BMI 21.62 kg/m    GENERAL APPEARANCE: Healthy, alert and no distress.  EYES:Conjunctiva/sclera clear.  EARS: No cerumen.   Ear canals w/o erythema.  TM's intact w/o erythema.    THROAT: No erythema w/o tonsillar enlargement . No exudates.  NECK: Supple, nontender, no lymphadenopathy.  RESP: Lungs with a few expiratory wheezes both bases.    CV: Regular rate and rhythm, normal S1 S2, no murmur noted.  NEURO: Awake, alert    SKIN: No rashes  Chest wall-entire lower anterior rib cage mildly tender/sore.    Oneida Mueller PA-C

## 2024-04-24 DIAGNOSIS — J20.9 ACUTE BRONCHITIS WITH SYMPTOMS > 10 DAYS: ICD-10-CM

## 2024-04-24 DIAGNOSIS — Z87.01 HISTORY OF PNEUMONIA: ICD-10-CM

## 2024-04-24 DIAGNOSIS — R06.2 WHEEZING: ICD-10-CM

## 2024-04-24 RX ORDER — ALBUTEROL SULFATE 90 UG/1
1 AEROSOL, METERED RESPIRATORY (INHALATION) EVERY 6 HOURS PRN
Qty: 18 G | Refills: 5 | Status: SHIPPED | OUTPATIENT
Start: 2024-04-24

## 2024-06-02 ENCOUNTER — OFFICE VISIT (OUTPATIENT)
Dept: URGENT CARE | Facility: URGENT CARE | Age: 69
End: 2024-06-02
Payer: COMMERCIAL

## 2024-06-02 VITALS
HEART RATE: 82 BPM | TEMPERATURE: 99.4 F | OXYGEN SATURATION: 99 % | RESPIRATION RATE: 18 BRPM | BODY MASS INDEX: 22.14 KG/M2 | WEIGHT: 111.3 LBS | DIASTOLIC BLOOD PRESSURE: 81 MMHG | SYSTOLIC BLOOD PRESSURE: 146 MMHG

## 2024-06-02 DIAGNOSIS — J02.0 STREP THROAT: Primary | ICD-10-CM

## 2024-06-02 DIAGNOSIS — J02.9 SORE THROAT: ICD-10-CM

## 2024-06-02 LAB — DEPRECATED S PYO AG THROAT QL EIA: POSITIVE

## 2024-06-02 PROCEDURE — 99213 OFFICE O/P EST LOW 20 MIN: CPT | Performed by: PHYSICIAN ASSISTANT

## 2024-06-02 PROCEDURE — 87880 STREP A ASSAY W/OPTIC: CPT | Performed by: PHYSICIAN ASSISTANT

## 2024-06-02 RX ORDER — PENICILLIN V POTASSIUM 500 MG/1
500 TABLET, FILM COATED ORAL 2 TIMES DAILY
Qty: 20 TABLET | Refills: 0 | Status: SHIPPED | OUTPATIENT
Start: 2024-06-02 | End: 2024-06-12

## 2024-06-02 ASSESSMENT — ENCOUNTER SYMPTOMS
NECK PAIN: 0
VOMITING: 0
ARTHRALGIAS: 0
SHORTNESS OF BREATH: 0
COUGH: 1
FATIGUE: 1
MYALGIAS: 1
NECK STIFFNESS: 0
FEVER: 1
CARDIOVASCULAR NEGATIVE: 1
NAUSEA: 0
CHILLS: 1
EYES NEGATIVE: 1
SORE THROAT: 1
DIARRHEA: 0

## 2024-06-02 NOTE — PROGRESS NOTES
Chief Complaint:     Chief Complaint   Patient presents with    Pharyngitis     Sore throat and fever for the past week, has been taking theraflu but hasn't been helping, no cough        Results for orders placed or performed in visit on 06/02/24   Streptococcus A Rapid Screen w/Reflex to PCR - Clinic Collect     Status: Abnormal    Specimen: Throat; Swab   Result Value Ref Range    Group A Strep antigen Positive (A) Negative       Medical Decision Making:    Vital signs reviewed by Adolfo De La Paz PA-C  BP (!) 146/81 (BP Location: Left arm, Patient Position: Sitting, Cuff Size: Adult Regular)   Pulse 82   Temp 99.4  F (37.4  C) (Tympanic)   Resp 18   Wt 50.5 kg (111 lb 4.8 oz)   LMP 09/09/2000 (Approximate)   SpO2 99%   BMI 22.14 kg/m      Differential Diagnosis:  URI Adult/Peds:  Strep pharyngitis and Viral pharyngitis        ASSESSMENT    1. Strep throat    2. Sore throat        PLAN    Patient is in no acute distress.    Temp is 99.4 in clinic today, lung sounds were clear, and O2 sats at 99% on RA.    RST was positive.  Rx for Penicillin given.  Rest, Push fluids, vaporizer, elevation of head of bed.  Ibuprofen and or Tylenol for any fever or body aches.  If symptoms worsen, recheck immediately otherwise follow up with your PCP in 1 week if symptoms are not improving.  Worrisome symptoms discussed with instructions to go to the ED.  Patient verbalized understanding and agreed with this plan.    Labs:    Results for orders placed or performed in visit on 06/02/24   Streptococcus A Rapid Screen w/Reflex to PCR - Clinic Collect     Status: Abnormal    Specimen: Throat; Swab   Result Value Ref Range    Group A Strep antigen Positive (A) Negative        Vital signs reviewed by Adolfo De La Paz PA-C  BP (!) 146/81 (BP Location: Left arm, Patient Position: Sitting, Cuff Size: Adult Regular)   Pulse 82   Temp 99.4  F (37.4  C) (Tympanic)   Resp 18   Wt 50.5 kg (111 lb 4.8 oz)   LMP 09/09/2000 (Approximate)    SpO2 99%   BMI 22.14 kg/m      Current Meds      Current Outpatient Medications:     atorvastatin (LIPITOR) 20 MG tablet, TAKE 1 TABLET BY MOUTH DAILY FOR CHOLESTEROL, Disp: 90 tablet, Rfl: 2    Multiple Vitamins-Minerals (MULTIVITAMIN ADULT PO), Take 1 tablet by mouth every morning, Disp: , Rfl:     Omega-3 Fatty Acids (OMEGA 3 PO), , Disp: , Rfl:     omeprazole (PRILOSEC) 20 MG DR capsule, TAKE 1 CAPSULE(20 MG) BY MOUTH DAILY, Disp: 30 capsule, Rfl: 0    penicillin V (VEETID) 500 MG tablet, Take 1 tablet (500 mg) by mouth 2 times daily for 10 days, Disp: 20 tablet, Rfl: 0    VENTOLIN  (90 Base) MCG/ACT inhaler, INHALE 2 PUFFS INTO THE LUNGS EVERY 6 HOURS AS NEEDED FOR SHORTNESS OF BREATH OR WHEEZING OR COUGH, Disp: 18 g, Rfl: 0    albuterol (PROAIR HFA/PROVENTIL HFA/VENTOLIN HFA) 108 (90 Base) MCG/ACT inhaler, Inhale 1 puff into the lungs every 6 hours as needed for shortness of breath, wheezing or cough (Patient not taking: Reported on 6/2/2024), Disp: 18 g, Rfl: 5    azithromycin (ZITHROMAX Z-TIGIST) 250 MG tablet, 2 tabs day one then 1 tab qd (Patient not taking: Reported on 6/2/2024), Disp: 6 tablet, Rfl: 0    calcium carbonate (TUMS) 500 MG chewable tablet, Take 1 chew tab by mouth 2 times daily Pt takes as needed (Patient not taking: Reported on 6/2/2024), Disp: , Rfl:     diclofenac (VOLTAREN) 50 MG EC tablet, Take 1 tablet (50 mg) by mouth 2 times daily for 7 days, Disp: 28 tablet, Rfl: 1      Respiratory History    no history of pneumonia or bronchitis      SUBJECTIVE    HPI: Juanita Galvan is an 68 year old female who presents with fatigue, fever, and sore throat.  Symptoms began 1 week ago and have been unchanged. There is no shortness of breath, wheezing, chest pain, nausea, or vomiting. Patient is eating and drinking well.      Patient denies any recent travel or exposure to known COVID positive tested individual.      ROS:     Review of Systems   Constitutional:  Positive for chills, fatigue and  fever.   HENT:  Positive for sore throat. Negative for congestion and ear pain.    Eyes: Negative.    Respiratory:  Positive for cough. Negative for shortness of breath.    Cardiovascular: Negative.  Negative for chest pain.   Gastrointestinal:  Negative for diarrhea, nausea and vomiting.   Musculoskeletal:  Positive for myalgias. Negative for arthralgias, neck pain and neck stiffness.   Skin: Negative.  Negative for rash.         Family History   Family History   Problem Relation Age of Onset    No Known Problems Mother     No Known Problems Father     No Known Problems Brother     No Known Problems Sister     No Known Problems Sister     No Known Problems Sister     Cancer No family hx of     Diabetes No family hx of     Hypertension No family hx of     Cerebrovascular Disease No family hx of     Thyroid Disease No family hx of     Glaucoma No family hx of     Macular Degeneration No family hx of     Deep Vein Thrombosis No family hx of     Pulmonary Embolism No family hx of     Anesthesia Reaction No family hx of         Problem history  Patient Active Problem List   Diagnosis    CARDIOVASCULAR SCREENING; LDL GOAL LESS THAN 160    Vitreous floaters    Advanced directives, counseling/discussion    Right foot pain    Screening for cervical cancer    Infection associated with implant (H24)    History of rhinoplasty    Infection of implant site (H24)        Allergies  Allergies   Allergen Reactions    Nkda [No Known Drug Allergy]     Seasonal Allergies         Social History  Social History     Socioeconomic History    Marital status:      Spouse name: Not on file    Number of children: 3    Years of education: Not on file    Highest education level: Not on file   Occupational History    Occupation: visual technician   Tobacco Use    Smoking status: Never    Smokeless tobacco: Never   Vaping Use    Vaping status: Never Used   Substance and Sexual Activity    Alcohol use: No    Drug use: No    Sexual activity:  Not Currently     Partners: Male   Other Topics Concern    Parent/sibling w/ CABG, MI or angioplasty before 65F 55M? No   Social History Narrative    Not on file     Social Determinants of Health     Financial Resource Strain: Low Risk  (12/12/2023)    Financial Resource Strain     Within the past 12 months, have you or your family members you live with been unable to get utilities (heat, electricity) when it was really needed?: No   Food Insecurity: Low Risk  (12/12/2023)    Food Insecurity     Within the past 12 months, did you worry that your food would run out before you got money to buy more?: No     Within the past 12 months, did the food you bought just not last and you didn t have money to get more?: No   Transportation Needs: Low Risk  (12/12/2023)    Transportation Needs     Within the past 12 months, has lack of transportation kept you from medical appointments, getting your medicines, non-medical meetings or appointments, work, or from getting things that you need?: No   Physical Activity: Not on file   Stress: Not on file   Social Connections: Not on file   Interpersonal Safety: Low Risk  (12/12/2023)    Interpersonal Safety     Do you feel physically and emotionally safe where you currently live?: Yes     Within the past 12 months, have you been hit, slapped, kicked or otherwise physically hurt by someone?: No     Within the past 12 months, have you been humiliated or emotionally abused in other ways by your partner or ex-partner?: No   Housing Stability: Low Risk  (12/12/2023)    Housing Stability     Do you have housing? : Yes     Are you worried about losing your housing?: No        OBJECTIVE     Vital signs reviewed by Adolfo De La Paz PA-C  BP (!) 146/81 (BP Location: Left arm, Patient Position: Sitting, Cuff Size: Adult Regular)   Pulse 82   Temp 99.4  F (37.4  C) (Tympanic)   Resp 18   Wt 50.5 kg (111 lb 4.8 oz)   LMP 09/09/2000 (Approximate)   SpO2 99%   BMI 22.14 kg/m       Physical  Exam  Vitals and nursing note reviewed.   Constitutional:       General: She is not in acute distress.     Appearance: She is well-developed. She is not ill-appearing, toxic-appearing or diaphoretic.   HENT:      Head: Normocephalic and atraumatic.      Right Ear: Hearing and external ear normal.      Left Ear: Hearing and external ear normal.      Nose: No congestion or rhinorrhea.      Mouth/Throat:      Pharynx: Posterior oropharyngeal erythema present. No pharyngeal swelling, oropharyngeal exudate or uvula swelling.      Tonsils: No tonsillar exudate or tonsillar abscesses. 1+ on the right. 1+ on the left.   Eyes:      General:         Right eye: No discharge.         Left eye: No discharge.   Cardiovascular:      Rate and Rhythm: Normal rate and regular rhythm.      Heart sounds: Normal heart sounds. No murmur heard.     No friction rub. No gallop.   Pulmonary:      Effort: Pulmonary effort is normal. No respiratory distress.      Breath sounds: Normal breath sounds. No decreased breath sounds, wheezing, rhonchi or rales.   Musculoskeletal:      Cervical back: Normal range of motion.   Lymphadenopathy:      Head:      Right side of head: No submental, submandibular, tonsillar, preauricular or posterior auricular adenopathy.      Left side of head: No submental, submandibular, tonsillar, preauricular or posterior auricular adenopathy.      Cervical: No cervical adenopathy.      Right cervical: No superficial or posterior cervical adenopathy.     Left cervical: No superficial or posterior cervical adenopathy.   Skin:     General: Skin is warm and dry.      Findings: No rash.   Neurological:      Mental Status: She is alert and oriented to person, place, and time.   Psychiatric:         Behavior: Behavior normal. Behavior is cooperative.         Thought Content: Thought content normal.         Judgment: Judgment normal.           Adolfo De La Paz PA-C  6/2/2024, 9:11 AM

## 2024-10-08 ENCOUNTER — ANCILLARY PROCEDURE (OUTPATIENT)
Dept: MAMMOGRAPHY | Facility: CLINIC | Age: 69
End: 2024-10-08
Attending: PREVENTIVE MEDICINE
Payer: COMMERCIAL

## 2024-10-08 DIAGNOSIS — Z12.31 VISIT FOR SCREENING MAMMOGRAM: ICD-10-CM

## 2024-10-08 PROCEDURE — 77063 BREAST TOMOSYNTHESIS BI: CPT | Mod: TC | Performed by: RADIOLOGY

## 2024-10-08 PROCEDURE — 77067 SCR MAMMO BI INCL CAD: CPT | Mod: TC | Performed by: RADIOLOGY

## 2024-10-24 DIAGNOSIS — E78.2 MIXED HYPERLIPIDEMIA: ICD-10-CM

## 2024-10-25 RX ORDER — ATORVASTATIN CALCIUM 20 MG/1
TABLET, FILM COATED ORAL
Qty: 90 TABLET | Refills: 0 | Status: SHIPPED | OUTPATIENT
Start: 2024-10-25

## 2025-03-04 ENCOUNTER — APPOINTMENT (OUTPATIENT)
Dept: INTERPRETER SERVICES | Facility: CLINIC | Age: 70
End: 2025-03-04
Payer: COMMERCIAL

## 2025-03-05 ENCOUNTER — OFFICE VISIT (OUTPATIENT)
Dept: FAMILY MEDICINE | Facility: CLINIC | Age: 70
End: 2025-03-05
Payer: COMMERCIAL

## 2025-03-05 VITALS
DIASTOLIC BLOOD PRESSURE: 70 MMHG | WEIGHT: 112.4 LBS | HEART RATE: 65 BPM | BODY MASS INDEX: 21.22 KG/M2 | RESPIRATION RATE: 18 BRPM | OXYGEN SATURATION: 100 % | HEIGHT: 61 IN | SYSTOLIC BLOOD PRESSURE: 138 MMHG | TEMPERATURE: 97.8 F

## 2025-03-05 DIAGNOSIS — R05.1 ACUTE COUGH: ICD-10-CM

## 2025-03-05 DIAGNOSIS — M71.22 SYNOVIAL CYST OF LEFT POPLITEAL SPACE: ICD-10-CM

## 2025-03-05 DIAGNOSIS — M25.562 ACUTE PAIN OF LEFT KNEE: Primary | ICD-10-CM

## 2025-03-05 PROCEDURE — 3075F SYST BP GE 130 - 139MM HG: CPT | Performed by: NURSE PRACTITIONER

## 2025-03-05 PROCEDURE — 3078F DIAST BP <80 MM HG: CPT | Performed by: NURSE PRACTITIONER

## 2025-03-05 PROCEDURE — 1125F AMNT PAIN NOTED PAIN PRSNT: CPT | Performed by: NURSE PRACTITIONER

## 2025-03-05 PROCEDURE — 99214 OFFICE O/P EST MOD 30 MIN: CPT | Performed by: NURSE PRACTITIONER

## 2025-03-05 RX ORDER — BENZONATATE 200 MG/1
200 CAPSULE ORAL 3 TIMES DAILY PRN
Qty: 60 CAPSULE | Refills: 0 | Status: SHIPPED | OUTPATIENT
Start: 2025-03-05

## 2025-03-05 ASSESSMENT — PAIN SCALES - GENERAL: PAINLEVEL_OUTOF10: SEVERE PAIN (8)

## 2025-03-05 NOTE — PATIENT INSTRUCTIONS
At [facility], we strive to deliver an exceptional experience to you, every time we see you. If you receive a survey, please let us know what we are doing well and/or what we could improve upon, as we do value your feedback.  If you have MyChart, you can expect to receive results automatically within 24 hours of their completion.  Your provider will send a note interpreting your results as well.   If you do not have MyChart, you should receive your results in about a week by mail.    Your care team:                            Family Medicine Internal Medicine   MD Kumar Moss, MD Kristi Barroso, MD Masoud Lovell, MD Whitney Gates, PAShaunC    Enrrique Lin, MD Pediatrics   Milagro Acosta, MD Lizeth Valenzuela, MD Deepthi Salinas, APRN CNP Mariella Gustafson APRN CNP   MD Corinne Bravo, MD Anabella Nunez, CNP     Edy Dan, CNP Same-Day Provider (No follow-up visits)   SHARDA Mitchell, DNP Nishi Dela Cruz, SHARDA Gonzalez, FNP, BC KATHRIN AngeloC     Clinic hours: Monday - Thursday 7 am-6 pm; Fridays 7 am-5 pm.   Urgent care: Monday - Friday 10 am- 8 pm; Saturday and Sunday 9 am-5 pm.    Clinic: [phone]       [facility]: Monday - Thursday 8 am - 7 pm; Friday 8 am - 6 pm  [facility]: [phone]

## 2025-03-05 NOTE — PROGRESS NOTES
"  Assessment & Plan     Acute on chronic pain of left knee  Synovial cyst of left popliteal space  And has chronic pain in left knee, history of meniscus tear.  Reports acute on chronic pain for the last few weeks, mostly to posterior knee with small bulge noted.  Discussed with patient that a Baker's cyst sometimes will resolve on its own but if pain persists after rest-compression-anti-inflammatories, she should make an appointment with orthopedics.  - Ankle/Knee Bracing Supplies Order Knee Sleeve/Brace; Left; Closed  - diclofenac (VOLTAREN) 50 MG EC tablet; Take 1 tablet (50 mg) by mouth 2 times daily.  - Orthopedic  Referral; Future    Acute cough  Patient presenting with cough which started a week ago. Worse at night.  Denied fever, chills. Denies history of GERD/heartburn, PND or sore throat. Denies recent travel.   - benzonatate (TESSALON) 200 MG capsule; Take 1 capsule (200 mg) by mouth 3 times daily as needed for cough.        Subjective   Juanita Johns is a 69 year old, presenting for the following health issues:  Musculoskeletal Problem ( Left knee (back of knee) pain)      3/5/2025     6:55 AM   Additional Questions   Roomed by Mayra   Accompanied by          3/5/2025     6:55 AM   Patient Reported Additional Medications   Patient reports taking the following new medications yes- Delsym for cough, mostly at night     Musculoskeletal Problem    History of Present Illness       Reason for visit:  Swolen knee   She is taking medications regularly.                  Review of Systems  Constitutional, HEENT, cardiovascular, pulmonary, gi and gu systems are negative, except as otherwise noted.      Objective    /70 (BP Location: Left arm, Patient Position: Sitting, Cuff Size: Adult Regular)   Pulse 65   Temp 97.8  F (36.6  C) (Oral)   Resp 18   Ht 1.552 m (5' 1.1\")   Wt 51 kg (112 lb 6.4 oz)   LMP 09/09/2000 (Approximate)   SpO2 100%   BMI 21.17 kg/m    Body mass index is 21.17 " kg/m .  Physical Exam   GENERAL: alert and no distress  MS: no gross musculoskeletal defects noted, no edema. Small bulge posterior L knee. Pain with ROM  SKIN: no suspicious lesions or rashes  PSYCH: mentation appears normal, affect normal/bright          Signed Electronically by: SHARDA Mitchell CNP

## 2025-03-06 ENCOUNTER — ANCILLARY PROCEDURE (OUTPATIENT)
Dept: GENERAL RADIOLOGY | Facility: CLINIC | Age: 70
End: 2025-03-06
Attending: PEDIATRICS
Payer: COMMERCIAL

## 2025-03-06 ENCOUNTER — OFFICE VISIT (OUTPATIENT)
Dept: ORTHOPEDICS | Facility: CLINIC | Age: 70
End: 2025-03-06
Attending: NURSE PRACTITIONER
Payer: COMMERCIAL

## 2025-03-06 DIAGNOSIS — M25.562 ACUTE PAIN OF LEFT KNEE: ICD-10-CM

## 2025-03-06 DIAGNOSIS — M71.22 SYNOVIAL CYST OF LEFT POPLITEAL SPACE: ICD-10-CM

## 2025-03-06 NOTE — PROGRESS NOTES
"ASSESSMENT & PLAN    There are no diagnoses linked to this encounter.  This issue is {ACUTE/CHRONIC:684978} and {IMPROVING WORSENIN}.      {FOLLOW UP PLANS (Optional):454422}    Jill Pineda MD  [facility] [place]    -----  No chief complaint on file.      SUBJECTIVE  Juanita Galvan is a/an 69 year old female who is seen in consultation at the request of  Jessica Guadalupe C.N.P. for evaluation of left knee.     The patient is seen with their .    Onset: 2 years(s) ago. Reports insidious onset without acute precipitating event. The patient reports that her pain is similar to pain that she had in  when an MRI was performed.  Location of Pain: left posterior knee  Worsened by: prolonged standing >30 minutes, walking, knee flexion  Better with: unsure  Treatments tried: no treatment tried to date knee sleeve, diclofenac  Associated symptoms: swelling - posterior knee, stiffness, crepitus    Orthopedic/Surgical history: YES - has left knee MRI , diagnosed with medial meniscus tear  Social History/Occupation: retired      REVIEW OF SYSTEMS:  Review of Systems    OBJECTIVE:  [facility] 2000 (Approximate)    General: healthy, alert and in no distress  Skin: no suspicious lesions or rash.  CV: distal perfusion intact ***  Resp: normal respiratory effort without conversational dyspnea   Psych: normal mood and affect  Gait: {FSOC GAIT:479839::\"NORMAL\"}  Neuro: Normal light sensory exam of *** extremity ***    ***     RADIOLOGY:  Final results and radiologist's interpretation, available in the [facility] health record.  Images were reviewed with the patient in the office today.  My personal interpretation of the performed imaging: ***      {[facility]  Documentation (Optional):668690}  { E&M time (Optional):278988}  {Provider  Link to [facility] Help Grid :801545}     "

## 2025-03-06 NOTE — PATIENT INSTRUCTIONS
Symptoms likely from a Baker's cyst. I reviewed images and discussed the diagnosis - A baker's cyst is a collection of fluid in the posterior knee, usually from irritation of knee joint pathology, underlying arthritis in this case.  I discussed the natural course - Most cysts will resolve on their own with supportive care including rest, ice, compression.  A cyst may rupture causing calf pain.  I discussed possible treatment including supportive care and physical therapy for knee strengthening, trial of intra-articular steroid injection and lastly, US guided evaluation and possible aspiration/injection of the cyst itself.  Imaging is also an option, though not typically necessary.  I discussed the possibility of recurrence.     Discussed nature of degenerative arthrosis of the knee. Discussed symptom treatment with over-the-counter medications, ice or heat, topical treatments, and rest if needed. Discussed use of sleeve or wrap for comfort. Discussed benefits of exercise and physical therapy. Discussed injection therapy. Also briefly discussed future consideration of referral to orthopedic surgery for further evaluation and discussion of arthroplasty.    Plan:  - Today's Plan of Care:  Continue with relative rest and activity modification, Ice, Compression, and Elevation.  Can apply ice 10-15 minutes 3-4 times per day as needed. OTC medications as needed.    Home Exercise Program    Discussed bracing options    -We also discussed other future treatment options:  Referral to Orthopedic Surgery  Consideration of injections:  *Corticosteroid or Consideration of US guided Hyaluronic Acid Injection (call first, requires insurance approval)    Follow Up: 6 - 8 weeks and as needed    If you have any further questions for your physician or physician s care team you can call [phone].

## 2025-06-04 ENCOUNTER — OFFICE VISIT (OUTPATIENT)
Dept: URGENT CARE | Facility: URGENT CARE | Age: 70
End: 2025-06-04
Payer: COMMERCIAL

## 2025-06-04 ENCOUNTER — ANCILLARY PROCEDURE (OUTPATIENT)
Dept: GENERAL RADIOLOGY | Facility: CLINIC | Age: 70
End: 2025-06-04
Attending: STUDENT IN AN ORGANIZED HEALTH CARE EDUCATION/TRAINING PROGRAM
Payer: COMMERCIAL

## 2025-06-04 VITALS
HEART RATE: 78 BPM | SYSTOLIC BLOOD PRESSURE: 136 MMHG | OXYGEN SATURATION: 98 % | TEMPERATURE: 98.4 F | HEIGHT: 59 IN | WEIGHT: 110.2 LBS | RESPIRATION RATE: 18 BRPM | BODY MASS INDEX: 22.22 KG/M2 | DIASTOLIC BLOOD PRESSURE: 75 MMHG

## 2025-06-04 DIAGNOSIS — R50.9 FEVER IN ADULT: ICD-10-CM

## 2025-06-04 DIAGNOSIS — J02.9 SORE THROAT: ICD-10-CM

## 2025-06-04 DIAGNOSIS — R09.82 POST-NASAL DRAINAGE: Primary | ICD-10-CM

## 2025-06-04 DIAGNOSIS — R05.1 ACUTE COUGH: ICD-10-CM

## 2025-06-04 LAB
DEPRECATED S PYO AG THROAT QL EIA: NEGATIVE
S PYO DNA THROAT QL NAA+PROBE: NOT DETECTED

## 2025-06-04 PROCEDURE — 3078F DIAST BP <80 MM HG: CPT | Performed by: STUDENT IN AN ORGANIZED HEALTH CARE EDUCATION/TRAINING PROGRAM

## 2025-06-04 PROCEDURE — 99214 OFFICE O/P EST MOD 30 MIN: CPT | Performed by: STUDENT IN AN ORGANIZED HEALTH CARE EDUCATION/TRAINING PROGRAM

## 2025-06-04 PROCEDURE — 87651 STREP A DNA AMP PROBE: CPT | Performed by: STUDENT IN AN ORGANIZED HEALTH CARE EDUCATION/TRAINING PROGRAM

## 2025-06-04 PROCEDURE — 1125F AMNT PAIN NOTED PAIN PRSNT: CPT | Performed by: STUDENT IN AN ORGANIZED HEALTH CARE EDUCATION/TRAINING PROGRAM

## 2025-06-04 PROCEDURE — 71046 X-RAY EXAM CHEST 2 VIEWS: CPT | Mod: TC | Performed by: INTERNAL MEDICINE

## 2025-06-04 PROCEDURE — 3075F SYST BP GE 130 - 139MM HG: CPT | Performed by: STUDENT IN AN ORGANIZED HEALTH CARE EDUCATION/TRAINING PROGRAM

## 2025-06-04 RX ORDER — CETIRIZINE HYDROCHLORIDE 10 MG/1
10 TABLET ORAL DAILY
Qty: 30 TABLET | Refills: 0 | Status: SHIPPED | OUTPATIENT
Start: 2025-06-04

## 2025-06-04 RX ORDER — BENZONATATE 100 MG/1
100-200 CAPSULE ORAL 3 TIMES DAILY PRN
Qty: 40 CAPSULE | Refills: 0 | Status: SHIPPED | OUTPATIENT
Start: 2025-06-04

## 2025-06-04 RX ORDER — FLUTICASONE PROPIONATE 50 MCG
1 SPRAY, SUSPENSION (ML) NASAL DAILY
Qty: 11.1 ML | Refills: 0 | Status: SHIPPED | OUTPATIENT
Start: 2025-06-04

## 2025-06-04 ASSESSMENT — PAIN SCALES - GENERAL: PAINLEVEL_OUTOF10: MODERATE PAIN (5)

## 2025-06-04 NOTE — PROGRESS NOTES
Urgent Care Clinic Visit    Chief Complaint   Patient presents with    URI     Dry cough, sore throat, runny nose - started a week ago     Fever     Fever at night for the past week                6/4/2025     2:18 PM   Additional Questions   Roomed by Anjelica   Accompanied by      No  Does the patient have a sore throat and either history of fever >100.4 in the previous 24 hours without a cough or recent exposure to a known case of strep throat? Yes

## 2025-06-04 NOTE — PROGRESS NOTES
ASSESSMENT & PLAN:   Diagnoses and all orders for this visit:  Post-nasal drainage  -     cetirizine (ZYRTEC) 10 MG tablet; Take 1 tablet (10 mg) by mouth daily.  -     fluticasone (FLONASE) 50 MCG/ACT nasal spray; Spray 1 spray into both nostrils daily.  Acute cough  -     XR Chest 2 Views; Future  -     benzonatate (TESSALON) 100 MG capsule; Take 1-2 capsules (100-200 mg) by mouth 3 times daily as needed for cough.  Sore throat  -     Streptococcus A Rapid Screen w/Reflex to PCR - Clinic Collect  -     Group A Streptococcus PCR Throat Swab    URI symptoms x 1 week.  Afebrile in clinic, last dose of ibuprofen 2 hours ago  Clear lung sounds with no increased work of breathing, O2 98%.  Rapid strep test negative, PCR pending.  Chest XR negative for acute infiltrate.   Suspect postviral vs postnasal drainage.  Tessalon as needed, start zyrtec and Flonase daily.    There are no Patient Instructions on file for this visit.    No follow-ups on file.    At the end of the encounter, I discussed results, diagnosis, medications. Discussed red flags for immediate return to clinic/ER, as well as indications for follow up if no improvement. Patient and/or caregiver understood and agreed to plan. Patient was stable for discharge.    ------------------------------------------------------------------------  SUBJECTIVE  History was obtained from patient.    Patient presents with:  URI: Dry cough, sore throat, runny nose - started a week ago   Fever: Fever at night for the past week     HPI  Juanita Galvan is a(n) 69 year old female presenting to urgent care for URI symptoms x 1 week. Reports cough, sore throat, rhinorrhea, sneezing, subjective fever (none measured) and chills. Cough is nonproductive. Symptoms occur at nighttime. No chest pain or shortness of breath. No nausea, vomiting, diarrhea. No history asthma or COPD. No history seasonal allergies.  sick with same symptoms.    Current Outpatient Medications   Medication  "Sig Dispense Refill    albuterol (PROAIR HFA/PROVENTIL HFA/VENTOLIN HFA) 108 (90 Base) MCG/ACT inhaler Inhale 1 puff into the lungs every 6 hours as needed for shortness of breath, wheezing or cough 18 g 5    atorvastatin (LIPITOR) 20 MG tablet TAKE 1 TABLET BY MOUTH DAILY FOR CHOLESTEROL 90 tablet 3    benzonatate (TESSALON) 100 MG capsule Take 1-2 capsules (100-200 mg) by mouth 3 times daily as needed for cough. 40 capsule 0    benzonatate (TESSALON) 200 MG capsule Take 1 capsule (200 mg) by mouth 3 times daily as needed for cough. 60 capsule 0    calcium carbonate (TUMS) 500 MG chewable tablet Take 1 chew tab by mouth 2 times daily. Pt takes as needed      cetirizine (ZYRTEC) 10 MG tablet Take 1 tablet (10 mg) by mouth daily. 30 tablet 0    diclofenac (VOLTAREN) 1 % topical gel Apply 2 g topically 4 times daily. 150 g 2    diclofenac (VOLTAREN) 50 MG EC tablet Take 1 tablet (50 mg) by mouth 2 times daily. 28 tablet 0    fluticasone (FLONASE) 50 MCG/ACT nasal spray Spray 1 spray into both nostrils daily. 11.1 mL 0    Multiple Vitamins-Minerals (MULTIVITAMIN ADULT PO) Take 1 tablet by mouth every morning      Omega-3 Fatty Acids (OMEGA 3 PO)       omeprazole (PRILOSEC) 20 MG DR capsule TAKE 1 CAPSULE(20 MG) BY MOUTH DAILY 30 capsule 0    triamcinolone (KENALOG) 0.1 % external ointment Apply topically 2 times daily. Use for a maximum of 2 weeks. 30 g 0    VENTOLIN  (90 Base) MCG/ACT inhaler INHALE 2 PUFFS INTO THE LUNGS EVERY 6 HOURS AS NEEDED FOR SHORTNESS OF BREATH OR WHEEZING OR COUGH 18 g 0         OBJECTIVE  Vitals:    06/04/25 1417   BP: 136/75   BP Location: Left arm   Patient Position: Sitting   Cuff Size: Adult Small   Pulse: 78   Resp: 18   Temp: 98.4  F (36.9  C)   TempSrc: Oral   SpO2: 98%   Weight: 50 kg (110 lb 3.2 oz)   Height: 1.499 m (4' 11\")     Physical Exam   GENERAL: healthy, alert, no acute distress.   PSYCH: mentation appears normal. Normal affect  HEAD: normocephalic, atraumatic.  EYE: " PERRL. EOMs intact. No scleral injection bilaterally.   EAR: external ear normal. Bilateral ear canals normal and nonpainful. Bilateral TM intact, pearly, translucent without bulging.  NOSE: external nose atraumatic without lesions.  OROPHARYNX: moist mucous membranes. Posterior oropharynx with mild erythema, no exudate. Uvula midline. Patent airway.  LUNGS: no increased work of breathing. Clear lung sounds bilaterally. No wheezing, rhonchi, or rales.   CV: regular rate and rhythm. No clicks, murmurs, or rubs.    Xrays were preliminarily reviewed by me - no acute infiltrate.       Results for orders placed or performed in visit on 06/04/25   XR Chest 2 Views     Status: None    Narrative    EXAM: XR CHEST 2 VIEWS  LOCATION: [facility]  DATE: 6/4/2025    INDICATION: cough, fever x 1 week  COMPARISON: 5/6/2023      Impression    IMPRESSION: Negative chest.   Results for orders placed or performed in visit on 06/04/25   Streptococcus A Rapid Screen w/Reflex to PCR - Clinic Collect     Status: Normal    Specimen: Throat; Swab   Result Value Ref Range    Group A Strep antigen Negative Negative

## 2025-07-30 ENCOUNTER — OFFICE VISIT (OUTPATIENT)
Dept: FAMILY MEDICINE | Facility: CLINIC | Age: 70
End: 2025-07-30
Payer: COMMERCIAL

## 2025-07-30 VITALS
SYSTOLIC BLOOD PRESSURE: 127 MMHG | HEIGHT: 59 IN | TEMPERATURE: 98.1 F | DIASTOLIC BLOOD PRESSURE: 76 MMHG | BODY MASS INDEX: 21.81 KG/M2 | HEART RATE: 72 BPM | WEIGHT: 108.2 LBS | OXYGEN SATURATION: 100 % | RESPIRATION RATE: 14 BRPM

## 2025-07-30 DIAGNOSIS — Z71.84 ENCOUNTER FOR COUNSELING FOR TRAVEL: Primary | ICD-10-CM

## 2025-07-30 DIAGNOSIS — M81.0 AGE-RELATED OSTEOPOROSIS WITHOUT CURRENT PATHOLOGICAL FRACTURE: ICD-10-CM

## 2025-07-30 DIAGNOSIS — E78.2 MIXED HYPERLIPIDEMIA: ICD-10-CM

## 2025-07-30 DIAGNOSIS — R05.1 ACUTE COUGH: ICD-10-CM

## 2025-07-30 DIAGNOSIS — L30.9 DERMATITIS: ICD-10-CM

## 2025-07-30 PROCEDURE — 3078F DIAST BP <80 MM HG: CPT | Performed by: PREVENTIVE MEDICINE

## 2025-07-30 PROCEDURE — 3074F SYST BP LT 130 MM HG: CPT | Performed by: PREVENTIVE MEDICINE

## 2025-07-30 PROCEDURE — 99213 OFFICE O/P EST LOW 20 MIN: CPT | Performed by: PREVENTIVE MEDICINE

## 2025-07-30 PROCEDURE — G2211 COMPLEX E/M VISIT ADD ON: HCPCS | Performed by: PREVENTIVE MEDICINE

## 2025-07-30 RX ORDER — BENZONATATE 200 MG/1
200 CAPSULE ORAL 3 TIMES DAILY PRN
Qty: 60 CAPSULE | Refills: 0 | Status: SHIPPED | OUTPATIENT
Start: 2025-07-30

## 2025-07-30 RX ORDER — TRIAMCINOLONE ACETONIDE 5 MG/G
OINTMENT TOPICAL
Qty: 45 G | Refills: 0 | Status: SHIPPED | OUTPATIENT
Start: 2025-07-30

## 2025-07-30 RX ORDER — ATORVASTATIN CALCIUM 20 MG/1
TABLET, FILM COATED ORAL
Qty: 180 TABLET | Refills: 0 | Status: SHIPPED | OUTPATIENT
Start: 2025-07-30

## 2025-07-30 RX ORDER — ALENDRONATE SODIUM 70 MG/1
70 TABLET ORAL
Qty: 12 TABLET | Refills: 3 | Status: SHIPPED | OUTPATIENT
Start: 2025-07-30

## 2025-07-30 RX ORDER — AZITHROMYCIN 500 MG/1
500 TABLET, FILM COATED ORAL DAILY
Qty: 3 TABLET | Refills: 0 | Status: SHIPPED | OUTPATIENT
Start: 2025-07-30 | End: 2025-08-02

## 2025-07-30 NOTE — PROGRESS NOTES
Assessment & Plan     Encounter for counseling for travel  -information on traveler's diarrhea reviewed   - azithromycin (ZITHROMAX) 500 MG tablet  Dispense: 3 tablet; Refill: 0    Mixed hyperlipidemia  -6 month refill provided, patient is aware that insurance may not cover   - atorvastatin (LIPITOR) 20 MG tablet  Dispense: 180 tablet; Refill: 0    Dermatitis  -risk of long term topical steroids reviewed including skin atrophy and pigment changes. Patient understands appropriate use method  - triamcinolone (KENALOG) 0.5 % external ointment  Dispense: 45 g; Refill: 0    Acute cough  - benzonatate (TESSALON) 200 MG capsule  Dispense: 60 capsule; Refill: 0    Age-related osteoporosis without current pathological fracture  -DEXA results reviewed   - alendronate (FOSAMAX) 70 MG tablet  Dispense: 12 tablet; Refill: 3      Follow-up       Subjective   Juanita Johns is a 69 year old, presenting for the following health issues:  traveling  (Needs meds )      7/30/2025    10:45 AM   Additional Questions   Roomed by corine   Accompanied by spouse         7/30/2025    10:45 AM   Patient Reported Additional Medications   Patient reports taking the following new medications no     History of Present Illness       Reason for visit:  Check health   She is taking medications regularly.        Will be traveling to [place] for 6 months.  Needs 6 months medication at a time.  Leaving October 2nd and then back in April     Would like cough  medication on hand when traveling       Hyperlipidemia Follow-Up    Are you regularly taking any medication or supplement to lower your cholesterol?   Yes- statin  Are you having muscle aches or other side effects that you think could be caused by your cholesterol lowering medication?  No        Review of Systems  Constitutional, HEENT, cardiovascular, pulmonary, gi and gu systems are negative, except as otherwise noted.      Objective    /76 (BP Location: Left arm, Patient Position: Sitting, Cuff  "Size: Adult Regular)   Pulse 72   Temp 98.1  F (36.7  C) (Oral)   Resp 14   Ht 1.499 m (4' 11\")   Wt 49.1 kg (108 lb 3.2 oz)   LMP 09/09/2000 (Approximate)   SpO2 100%   BMI 21.85 kg/m    Body mass index is 21.85 kg/m .  Physical Exam   GENERAL APPEARANCE: healthy, alert and no distress  EYES: Eyes grossly normal to inspection and conjunctivae and sclerae normal  RESP: lungs clear to auscultation - no rales, rhonchi or wheezes  CV: regular rates and rhythm, normal S1 S2, no S3 or S4 and no murmur, click or rub  ABDOMEN: soft, non-tender and no rebound or guarding   MS: extremities normal- no gross deformities noted and peripheral pulses normal  SKIN: no suspicious lesions or rashes  NEURO: Normal strength and tone, mentation intact and speech normal  PSYCH: mentation appears normal        No results found for this or any previous visit (from the past 24 hours).        Signed Electronically by: Milagro Acosta MD    "

## (undated) DEVICE — GLOVE PROTEXIS BLUE W/NEU-THERA 7.5  2D73EB75

## (undated) DEVICE — KIT ENDO FIRST STEP DISINFECTANT 200ML W/POUCH EP-4

## (undated) DEVICE — NDL 25GA 2"  8881200441

## (undated) DEVICE — PAD CHUX UNDERPAD 23X24" 7136

## (undated) DEVICE — SOL NACL 0.9% IRRIG 1000ML BOTTLE 2F7124

## (undated) DEVICE — PACK ENT ENDOSCOPY CUSTOM ASC

## (undated) DEVICE — SUCTION MANIFOLD NEPTUNE 2 SYS 4 PORT 0702-020-000

## (undated) DEVICE — SYR 30ML LL W/O NDL 302832

## (undated) DEVICE — GLOVE PROTEXIS MICRO 7.0  2D73PM70

## (undated) DEVICE — SPONGE COTTONOID 1/2X3" 80-1407

## (undated) DEVICE — APPLICATOR COTTON TIP 6"X2 STERILE LF 6012

## (undated) DEVICE — SOL WATER IRRIG 1000ML BOTTLE 2F7114

## (undated) DEVICE — NDL COUNTER 20CT 31142493

## (undated) DEVICE — DRAPE U SPLIT 74X120" 29440

## (undated) DEVICE — LINEN TOWEL PACK X5 5464

## (undated) DEVICE — ESU GROUND PAD ADULT W/CORD E7507

## (undated) DEVICE — ANTIFOG SOLUTION W/FOAM PAD 31142527

## (undated) DEVICE — SPONGE RAY-TEC 4X8" 7318

## (undated) DEVICE — SYR 03ML LL W/O NDL 309657

## (undated) DEVICE — PREP CHLORAPREP 26ML TINTED ORANGE  260815

## (undated) RX ORDER — CLINDAMYCIN PHOSPHATE 900 MG/50ML
INJECTION, SOLUTION INTRAVENOUS
Status: DISPENSED
Start: 2020-09-10

## (undated) RX ORDER — PROPOFOL 10 MG/ML
INJECTION, EMULSION INTRAVENOUS
Status: DISPENSED
Start: 2020-09-10

## (undated) RX ORDER — MUPIROCIN 20 MG/G
OINTMENT TOPICAL
Status: DISPENSED
Start: 2020-09-10

## (undated) RX ORDER — FENTANYL CITRATE 50 UG/ML
INJECTION, SOLUTION INTRAMUSCULAR; INTRAVENOUS
Status: DISPENSED
Start: 2020-09-10

## (undated) RX ORDER — LIDOCAINE HYDROCHLORIDE 20 MG/ML
INJECTION, SOLUTION EPIDURAL; INFILTRATION; INTRACAUDAL; PERINEURAL
Status: DISPENSED
Start: 2020-09-10

## (undated) RX ORDER — SIMETHICONE 40MG/0.6ML
SUSPENSION, DROPS(FINAL DOSAGE FORM)(ML) ORAL
Status: DISPENSED
Start: 2021-06-07

## (undated) RX ORDER — FENTANYL CITRATE 50 UG/ML
INJECTION, SOLUTION INTRAMUSCULAR; INTRAVENOUS
Status: DISPENSED
Start: 2021-06-07

## (undated) RX ORDER — OXYMETAZOLINE HYDROCHLORIDE 0.05 G/100ML
SPRAY NASAL
Status: DISPENSED
Start: 2020-09-10